# Patient Record
Sex: FEMALE | Race: WHITE | NOT HISPANIC OR LATINO | Employment: OTHER | URBAN - METROPOLITAN AREA
[De-identification: names, ages, dates, MRNs, and addresses within clinical notes are randomized per-mention and may not be internally consistent; named-entity substitution may affect disease eponyms.]

---

## 2017-02-14 ENCOUNTER — ALLSCRIPTS OFFICE VISIT (OUTPATIENT)
Dept: OTHER | Facility: OTHER | Age: 72
End: 2017-02-14

## 2017-02-28 ENCOUNTER — GENERIC CONVERSION - ENCOUNTER (OUTPATIENT)
Dept: OTHER | Facility: OTHER | Age: 72
End: 2017-02-28

## 2017-03-02 ENCOUNTER — TRANSCRIBE ORDERS (OUTPATIENT)
Dept: ADMINISTRATIVE | Facility: HOSPITAL | Age: 72
End: 2017-03-02

## 2017-03-02 DIAGNOSIS — G20 PARKINSON'S DISEASE (HCC): Primary | ICD-10-CM

## 2017-03-06 ENCOUNTER — ALLSCRIPTS OFFICE VISIT (OUTPATIENT)
Dept: RADIOLOGY | Facility: CLINIC | Age: 72
End: 2017-03-06
Payer: COMMERCIAL

## 2017-03-13 ENCOUNTER — HOSPITAL ENCOUNTER (OUTPATIENT)
Dept: MRI IMAGING | Facility: HOSPITAL | Age: 72
Discharge: HOME | End: 2017-03-13
Payer: COMMERCIAL

## 2017-03-13 DIAGNOSIS — G20 PARKINSON'S DISEASE (HCC): ICD-10-CM

## 2017-03-13 PROCEDURE — 70553 MRI BRAIN STEM W/O & W/DYE: CPT

## 2017-03-13 PROCEDURE — A9585 GADOBUTROL INJECTION: HCPCS | Performed by: RADIOLOGY

## 2017-03-13 RX ADMIN — GADOBUTROL 6 ML: 604.72 INJECTION INTRAVENOUS at 14:02

## 2017-03-20 ENCOUNTER — ALLSCRIPTS OFFICE VISIT (OUTPATIENT)
Dept: OTHER | Facility: OTHER | Age: 72
End: 2017-03-20

## 2017-03-22 ENCOUNTER — GENERIC CONVERSION - ENCOUNTER (OUTPATIENT)
Dept: OTHER | Facility: OTHER | Age: 72
End: 2017-03-22

## 2017-03-28 ENCOUNTER — ALLSCRIPTS OFFICE VISIT (OUTPATIENT)
Dept: OTHER | Facility: OTHER | Age: 72
End: 2017-03-28

## 2017-04-10 ENCOUNTER — GENERIC CONVERSION - ENCOUNTER (OUTPATIENT)
Dept: OTHER | Facility: OTHER | Age: 72
End: 2017-04-10

## 2017-05-02 ENCOUNTER — GENERIC CONVERSION - ENCOUNTER (OUTPATIENT)
Dept: OTHER | Facility: OTHER | Age: 72
End: 2017-05-02

## 2017-09-13 ENCOUNTER — GENERIC CONVERSION - ENCOUNTER (OUTPATIENT)
Dept: OTHER | Facility: OTHER | Age: 72
End: 2017-09-13

## 2017-10-31 ENCOUNTER — ALLSCRIPTS OFFICE VISIT (OUTPATIENT)
Dept: OTHER | Facility: OTHER | Age: 72
End: 2017-10-31

## 2017-10-31 DIAGNOSIS — I70.0 ATHEROSCLEROSIS OF AORTA (HCC): ICD-10-CM

## 2017-10-31 DIAGNOSIS — G25.0 ESSENTIAL TREMOR: ICD-10-CM

## 2017-10-31 DIAGNOSIS — K59.09 OTHER CONSTIPATION: ICD-10-CM

## 2017-10-31 DIAGNOSIS — I10 ESSENTIAL (PRIMARY) HYPERTENSION: ICD-10-CM

## 2017-10-31 DIAGNOSIS — D53.9 NUTRITIONAL ANEMIA: ICD-10-CM

## 2017-10-31 DIAGNOSIS — E78.5 HYPERLIPIDEMIA: ICD-10-CM

## 2017-10-31 DIAGNOSIS — H40.9 GLAUCOMA: ICD-10-CM

## 2017-10-31 DIAGNOSIS — Z00.00 ENCOUNTER FOR GENERAL ADULT MEDICAL EXAMINATION WITHOUT ABNORMAL FINDINGS: ICD-10-CM

## 2017-10-31 DIAGNOSIS — F41.9 ANXIETY DISORDER: ICD-10-CM

## 2017-11-06 ENCOUNTER — APPOINTMENT (OUTPATIENT)
Dept: LAB | Facility: CLINIC | Age: 72
End: 2017-11-06
Payer: COMMERCIAL

## 2017-11-06 DIAGNOSIS — F41.9 ANXIETY DISORDER: ICD-10-CM

## 2017-11-06 DIAGNOSIS — D53.9 NUTRITIONAL ANEMIA: ICD-10-CM

## 2017-11-06 DIAGNOSIS — I10 ESSENTIAL (PRIMARY) HYPERTENSION: ICD-10-CM

## 2017-11-06 DIAGNOSIS — K59.09 OTHER CONSTIPATION: ICD-10-CM

## 2017-11-06 DIAGNOSIS — E78.5 HYPERLIPIDEMIA: ICD-10-CM

## 2017-11-06 DIAGNOSIS — G25.0 ESSENTIAL TREMOR: ICD-10-CM

## 2017-11-06 DIAGNOSIS — H40.9 GLAUCOMA: ICD-10-CM

## 2017-11-06 DIAGNOSIS — Z00.00 ENCOUNTER FOR GENERAL ADULT MEDICAL EXAMINATION WITHOUT ABNORMAL FINDINGS: ICD-10-CM

## 2017-11-06 DIAGNOSIS — I70.0 ATHEROSCLEROSIS OF AORTA (HCC): ICD-10-CM

## 2017-11-06 LAB
ALBUMIN SERPL BCP-MCNC: 3.6 G/DL (ref 3.5–5)
ALP SERPL-CCNC: 111 U/L (ref 46–116)
ALT SERPL W P-5'-P-CCNC: 29 U/L (ref 12–78)
ANION GAP SERPL CALCULATED.3IONS-SCNC: 4 MMOL/L (ref 4–13)
AST SERPL W P-5'-P-CCNC: 23 U/L (ref 5–45)
BACTERIA UR QL AUTO: ABNORMAL /HPF
BASOPHILS # BLD AUTO: 0.04 THOUSANDS/ΜL (ref 0–0.1)
BASOPHILS NFR BLD AUTO: 1 % (ref 0–1)
BILIRUB SERPL-MCNC: 0.61 MG/DL (ref 0.2–1)
BILIRUB UR QL STRIP: NEGATIVE
BUN SERPL-MCNC: 11 MG/DL (ref 5–25)
CALCIUM SERPL-MCNC: 8.8 MG/DL (ref 8.3–10.1)
CHLORIDE SERPL-SCNC: 102 MMOL/L (ref 100–108)
CHOLEST SERPL-MCNC: 210 MG/DL (ref 50–200)
CLARITY UR: ABNORMAL
CO2 SERPL-SCNC: 31 MMOL/L (ref 21–32)
COLOR UR: YELLOW
CREAT SERPL-MCNC: 0.79 MG/DL (ref 0.6–1.3)
EOSINOPHIL # BLD AUTO: 0.16 THOUSAND/ΜL (ref 0–0.61)
EOSINOPHIL NFR BLD AUTO: 2 % (ref 0–6)
ERYTHROCYTE [DISTWIDTH] IN BLOOD BY AUTOMATED COUNT: 16.3 % (ref 11.6–15.1)
EST. AVERAGE GLUCOSE BLD GHB EST-MCNC: 128 MG/DL
FOLATE SERPL-MCNC: 9.6 NG/ML (ref 3.1–17.5)
GFR SERPL CREATININE-BSD FRML MDRD: 74 ML/MIN/1.73SQ M
GLUCOSE P FAST SERPL-MCNC: 95 MG/DL (ref 65–99)
GLUCOSE UR STRIP-MCNC: NEGATIVE MG/DL
HBA1C MFR BLD: 6.1 % (ref 4.2–6.3)
HCT VFR BLD AUTO: 35.7 % (ref 34.8–46.1)
HDLC SERPL-MCNC: 48 MG/DL (ref 40–60)
HGB BLD-MCNC: 11 G/DL (ref 11.5–15.4)
HGB UR QL STRIP.AUTO: NEGATIVE
HYALINE CASTS #/AREA URNS LPF: ABNORMAL /LPF
IRON SATN MFR SERPL: 26 %
IRON SERPL-MCNC: 103 UG/DL (ref 50–170)
KETONES UR STRIP-MCNC: NEGATIVE MG/DL
LDLC SERPL CALC-MCNC: 123 MG/DL (ref 0–100)
LEUKOCYTE ESTERASE UR QL STRIP: ABNORMAL
LYMPHOCYTES # BLD AUTO: 2.06 THOUSANDS/ΜL (ref 0.6–4.47)
LYMPHOCYTES NFR BLD AUTO: 28 % (ref 14–44)
MCH RBC QN AUTO: 20.3 PG (ref 26.8–34.3)
MCHC RBC AUTO-ENTMCNC: 30.8 G/DL (ref 31.4–37.4)
MCV RBC AUTO: 66 FL (ref 82–98)
MONOCYTES # BLD AUTO: 0.53 THOUSAND/ΜL (ref 0.17–1.22)
MONOCYTES NFR BLD AUTO: 7 % (ref 4–12)
NEUTROPHILS # BLD AUTO: 4.56 THOUSANDS/ΜL (ref 1.85–7.62)
NEUTS SEG NFR BLD AUTO: 62 % (ref 43–75)
NITRITE UR QL STRIP: NEGATIVE
NON-SQ EPI CELLS URNS QL MICRO: ABNORMAL /HPF
NRBC BLD AUTO-RTO: 0 /100 WBCS
PH UR STRIP.AUTO: 7.5 [PH] (ref 4.5–8)
PLATELET # BLD AUTO: 290 THOUSANDS/UL (ref 149–390)
PMV BLD AUTO: 9.9 FL (ref 8.9–12.7)
POTASSIUM SERPL-SCNC: 4 MMOL/L (ref 3.5–5.3)
PROT SERPL-MCNC: 7.2 G/DL (ref 6.4–8.2)
PROT UR STRIP-MCNC: NEGATIVE MG/DL
RBC # BLD AUTO: 5.42 MILLION/UL (ref 3.81–5.12)
RBC #/AREA URNS AUTO: ABNORMAL /HPF
SODIUM SERPL-SCNC: 137 MMOL/L (ref 136–145)
SP GR UR STRIP.AUTO: 1.01 (ref 1–1.03)
TIBC SERPL-MCNC: 391 UG/DL (ref 250–450)
TRIGL SERPL-MCNC: 194 MG/DL
TSH SERPL DL<=0.05 MIU/L-ACNC: 0.96 UIU/ML (ref 0.36–3.74)
UROBILINOGEN UR QL STRIP.AUTO: 0.2 E.U./DL
VIT B12 SERPL-MCNC: 282 PG/ML (ref 100–900)
WBC # BLD AUTO: 7.4 THOUSAND/UL (ref 4.31–10.16)
WBC #/AREA URNS AUTO: ABNORMAL /HPF

## 2017-11-06 PROCEDURE — 85025 COMPLETE CBC W/AUTO DIFF WBC: CPT

## 2017-11-06 PROCEDURE — 83036 HEMOGLOBIN GLYCOSYLATED A1C: CPT

## 2017-11-06 PROCEDURE — 80053 COMPREHEN METABOLIC PANEL: CPT

## 2017-11-06 PROCEDURE — 81001 URINALYSIS AUTO W/SCOPE: CPT

## 2017-11-06 PROCEDURE — 83540 ASSAY OF IRON: CPT

## 2017-11-06 PROCEDURE — 36415 COLL VENOUS BLD VENIPUNCTURE: CPT

## 2017-11-06 PROCEDURE — 84443 ASSAY THYROID STIM HORMONE: CPT

## 2017-11-06 PROCEDURE — 80061 LIPID PANEL: CPT

## 2017-11-06 PROCEDURE — 83550 IRON BINDING TEST: CPT

## 2017-11-06 PROCEDURE — 82607 VITAMIN B-12: CPT

## 2017-11-06 PROCEDURE — 82746 ASSAY OF FOLIC ACID SERUM: CPT

## 2017-11-07 ENCOUNTER — GENERIC CONVERSION - ENCOUNTER (OUTPATIENT)
Dept: OTHER | Facility: OTHER | Age: 72
End: 2017-11-07

## 2017-12-07 ENCOUNTER — OFFICE VISIT (OUTPATIENT)
Dept: URGENT CARE | Facility: CLINIC | Age: 72
End: 2017-12-07
Payer: COMMERCIAL

## 2017-12-07 DIAGNOSIS — N39.0 URINARY TRACT INFECTION: ICD-10-CM

## 2017-12-07 LAB
BILIRUB UR QL STRIP: NEGATIVE
CLARITY UR: NORMAL
COLOR UR: YELLOW
GLUCOSE (HISTORICAL): NEGATIVE
HGB UR QL STRIP.AUTO: NORMAL
KETONES UR STRIP-MCNC: NEGATIVE MG/DL
LEUKOCYTE ESTERASE UR QL STRIP: NORMAL
NITRITE UR QL STRIP: NEGATIVE
PH UR STRIP.AUTO: 8 [PH]
SP GR UR STRIP.AUTO: 1
UROBILINOGEN UR QL STRIP.AUTO: 0.2

## 2017-12-07 PROCEDURE — 81002 URINALYSIS NONAUTO W/O SCOPE: CPT

## 2017-12-07 PROCEDURE — 99283 EMERGENCY DEPT VISIT LOW MDM: CPT

## 2017-12-07 PROCEDURE — G0382 LEV 3 HOSP TYPE B ED VISIT: HCPCS

## 2017-12-08 ENCOUNTER — APPOINTMENT (OUTPATIENT)
Dept: LAB | Facility: HOSPITAL | Age: 72
End: 2017-12-08
Attending: EMERGENCY MEDICINE
Payer: COMMERCIAL

## 2017-12-08 DIAGNOSIS — N39.0 URINARY TRACT INFECTION: ICD-10-CM

## 2017-12-08 PROCEDURE — 87077 CULTURE AEROBIC IDENTIFY: CPT

## 2017-12-08 PROCEDURE — 87186 SC STD MICRODIL/AGAR DIL: CPT

## 2017-12-08 PROCEDURE — 87086 URINE CULTURE/COLONY COUNT: CPT

## 2017-12-08 NOTE — PROGRESS NOTES
Assessment    1  Acute urinary tract infection (599 0) (N39 0)    Plan  Acute urinary tract infection    · Phenazopyridine HCl - 200 MG Oral Tablet; TAKE 1 TABLET 3 TIMES DAILY AFTERMEALS AS NEEDED   · Sulfamethoxazole-Trimethoprim 800-160 MG Oral Tablet; Take one tab twice dailyfor 7 days    Discussion/Summary  Discussion Summary:   Rest at home  Extra liquids to drink  If he developed back pain fever vomiting or any other worsening return immediately as we discussed  Medication Side Effects Reviewed: Possible side effects of new medications were reviewed with the patient/guardian today  Understands and agrees with treatment plan: The treatment plan was reviewed with the patient/guardian  The patient/guardian understands and agrees with the treatment plan      Chief Complaint    1  Urinary Frequency  Chief Complaint Free Text Note Form: Urinary frequency X 7 days  Started with burning and itching today  History of Present Illness  HPI: This patient has had urinary frequency and urgency for 1 week  She began to have burning dysuria and itching today  No vaginal discharge  No new back pain  No fever or chills  No nausea vomiting  is alert oriented pleasant and in no distress  She does not appear toxic  Flexion CVAs are benign and nontender to palpation or percussion  Abdomen is soft benign and nontender with no guarding or peritoneal signs  Good skin color and turgor  Vital signs stable  Hospital Based Practices Required Assessment:  Pain Assessment  the patient states they have pain  (on a scale of 0 to 10, the patient rates the pain at 8 )  Abuse And Domestic Violence Screen   Yes, the patient is safe at home  Depression And Suicide Screen  No, the patient has not had thoughts of hurting themself  No, the patient has not felt depressed in the past 7 days  Prefered Language is  [place]  Primary Language is  English  Active Problems  1  Abdominal aortic atherosclerosis (440 0) (I70 0)   2  Ambulatory dysfunction (719 7) (R26 2)   3  Anemia due to poor nutrition (281 9) (D53 9)   4  Bicipital tendinitis of left shoulder (726 12) (M75 22)   5  Cataract, left (366 9) (H26 9)   6  Chronic anxiety (300 00) (F41 9)   7  Chronic constipation (564 00) (K59 09)   8  Essential tremor (333 1) (G25 0)   9  Gastroesophageal reflux disease (530 81) (K21 9)   10  Glaucoma (365 9) (H40 9)   11  Hypertension (401 9) (I10)   12  Iron deficiency anemia (280 9) (D50 9)   13  Lumbago (724 2) (M54 5)   14  Lumbosacral spinal stenosis (724 02) (M48 07)   15  Osteopenia (733 90) (M85 80)   16  Spinal stenosis of lumbar region (724 02) (M48 061)    Past Medical History    1  History of Acute frontal sinusitis, recurrence not specified (461 1) (J01 10)   2  History of Foot pain, left (729 5) (M79 672)   3  History of Intervertebral disc disorder with radiculopathy of lumbosacral region (724 4) (M51 17)   4  History of Lumbar disc disease (722 93) (M51 9)   5  History of Sciatica (724 3) (M54 30)   6  History of Traumatic ecchymosis of right forearm, initial encounter (923 10) (S50 11XA)  Active Problems And Past Medical History Reviewed: The active problems and past medical history were reviewed and updated today  Family History  Mother    1  Family history of Diabetes   2  Family history of Heart disease   3  Family history of HTN (hypertension)   4  Family history of Macular degeneration  Father    5  Family history of CHD (coronary heart disease)  Brother    10  Family history of Macular degeneration  Family History    7  Family history of Back disorder  Family History Reviewed: The family history was reviewed and updated today  Social History   · Currently working   · Former smoker (T82 64) (D53 465)   · Full-time employment   · Inadequate exercise (V69 0) (Z72 3)   · Lives with    ·    · Social alcohol use (Z78 9)   · Denied: History of Tobacco use  Social History Reviewed:  The social history was reviewed and updated today  Surgical History    1  History of  Section   2  History of Cholecystectomy Laparoscopic  Surgical History Reviewed: The surgical history was reviewed and updated today  Current Meds   1  Adult Low Dose Aspirin 81 MG TABS; TAKE 1 TABLET DAILY; Therapy: (Recorded:2017) to Recorded   2  ALPRAZolam 0 25 MG Oral Tablet; one tab every 6-8 hours prn; Therapy: 46ZED9807 to (Complete:2018)  Requested for: 2017; Last Rx:39Nfw0102 Ordered   3  Ibuprofen 200 MG Oral Tablet Recorded   4  Metoprolol Tartrate 50 MG Oral Tablet; take one tablet by mouth twice daily; Therapy: 18NHZ1741 to (Evaluate:2017)  Requested for: 2017; Last Rx:2017 Ordered   5  Omeprazole 20 MG Oral Capsule Delayed Release; TAKE 1 CAPSULE Daily Recorded   6  Pravastatin Sodium 10 MG Oral Tablet; Take 1 tablet by mouth at bedtime; Therapy: (Recorded:2017) to  Requested for: 85KCQ8233 Recorded   7  Rasagiline Mesylate 0 5 MG Oral Tablet; TAKE 1 TABLET DAILY; Therapy: (Recorded:2017) to Recorded  Medication List Reviewed: The medication list was reviewed and updated today  Allergies    1  No Known Drug Allergies    Vitals  Signs   Recorded: 15YVK0501 04:50PM   Temperature: 98 5 F  Heart Rate: 76  Respiration: 16  Systolic: 655  Diastolic: 89  Height: 5 ft   Weight: 148 lb   BMI Calculated: 28 9  BSA Calculated: 1 64  O2 Saturation: 99  Pain Scale: 8    Results/Data  Diagnostic Studies Reviewed: I personally reviewed the films/images/results in the office today  My interpretation follows  Diagnostic Review Urine dips positive for leukocytes  Culture sent        Signatures   Electronically signed by : JIM Dotson ; Dec  7 2017  5:11PM EST                       (Author)

## 2017-12-10 LAB — BACTERIA UR CULT: ABNORMAL

## 2018-01-10 NOTE — MISCELLANEOUS
Message  Return to work or school:   Trevor Chan is under my professional care  She was seen in my office on 10/31/17   She is able to return to work on  11/1/17      Due to patient's medical symptoms/condition, we recommend that patient sit during work, and to avoid any prolonged standing as to not aggravate her symptoms/condition  Devin Soulier P A-C        Signatures   Electronically signed by : Devin Soulier, [facility]; Oct 31 2017 11:37AM EST                       (Author)    Electronically signed by : Devin Soulier, [facility]; Oct 31 2017 11:44AM EST                       (Author)

## 2018-01-12 NOTE — RESULT NOTES
Message   Recorded as Task   Date: 09/16/2016 10:25 AM, Created By: Viji Sandhu   Task Name: Follow Up   Assigned To: Ling Read   Regarding Patient: Chanel Nurse, Status: Active   Comment:    Sussy Corley - 16 Sep 2016 10:25 AM     TASK CREATED  Caller: Self; General Medical Question; [phone] (Home); [phone] (Mobile Phone)  s/w pt, states pain in her b/l low back and buttock has returned  Saw pcp yesterday - referred her back to [address]  Per DG's ov note of 9/6/2016 - if symptoms would return or worsen, poss repeat injection  Advised pt, will d/w Dr Chan Roque and cb  Pt verbalized understanding and denied blood thinning medication  Elliot Mercer - 16 Sep 2016 10:36 AM     TASK REPLIED TO: Previously Assigned To [facility] [place] [facility]              ok to schedule   Sussy Corley - 16 Sep 2016 12:55 PM     TASK REASSIGNED: Previously Assigned To [facility] [place] [facility]   Ling Read - 16 Sep 2016 3:44 PM     TASK REASSIGNED: Previously Assigned To [facility] sched,Team  what procedure to be scheduled? Elliot Mercer - 17 Sep 2016 8:54 AM     TASK REPLIED TO: Previously Assigned To Elliot Mercer Phyllis - 19 Sep 2016 1:45 PM     TASK REASSIGNED: Previously Assigned To [facility] sched,Team   Ling Read - 19 Sep 2016 1:48 PM     TASK EDITED  called pt to schedule procedure and pt states she is doing better if needed she will call back     Elliot Mercer - 19 Sep 2016 3:44 PM     TASK REPLIED TO: Previously Assigned To Elliot Mercer

## 2018-01-12 NOTE — RESULT NOTES
Message   Recorded as Task   Date: 10/07/2016 11:27 AM, Created By: Derick Greenwood   Task Name: Miscellaneous   Assigned To: Ling Read   Regarding Patient: Kaila Oseguera, Status: Active   CommentEfraim Halt - 07 Oct 2016 11:27 AM     TASK CREATED  Pt has had 2 inj for LESI in July and Aug of this year  Her pain has returned  Can she be sched for a 3rd inj or does she need to be seen in the office first?   [facility] - 07 Oct 2016 7:46 PM     TASK REASSIGNED: Previously Assigned To Elliot Mercer  please review where pain is and any changes   Sussy Corley - 10 Oct 2016 11:14 AM     TASK EDITED  S/w pt, states her pain is in the bottom of her spine and the top of her legs b/l  Per pt, at this time, no improvement in her pain w/ procedures  Advised pt, will d/w Dr Oscar Carrillo and cb  Pt verbalized understanding and appreciation  Elliot Mercer - 10 Oct 2016 11:47 AM     TASK REASSIGNED: Previously Assigned To Elliot Mercer      ok to reschedule then 2 week f/u with dg after procedure   Ling Read - 11 Oct 2016 11:34 AM     TASK EDITED   procedure scheduled for 10/17/2016 pt aware if becomes ill/antbx to call to r/s, need for , npo 1 hr prior, wear comfortable pants, pt verbally understands

## 2018-01-13 VITALS
WEIGHT: 146.38 LBS | BODY MASS INDEX: 29.51 KG/M2 | HEART RATE: 64 BPM | SYSTOLIC BLOOD PRESSURE: 130 MMHG | HEIGHT: 59 IN | DIASTOLIC BLOOD PRESSURE: 74 MMHG

## 2018-01-13 VITALS
DIASTOLIC BLOOD PRESSURE: 84 MMHG | BODY MASS INDEX: 29.84 KG/M2 | HEART RATE: 60 BPM | WEIGHT: 148 LBS | SYSTOLIC BLOOD PRESSURE: 152 MMHG | HEIGHT: 59 IN

## 2018-01-13 VITALS
DIASTOLIC BLOOD PRESSURE: 80 MMHG | TEMPERATURE: 100.4 F | SYSTOLIC BLOOD PRESSURE: 140 MMHG | HEIGHT: 59 IN | HEART RATE: 60 BPM | WEIGHT: 144 LBS | BODY MASS INDEX: 29.03 KG/M2

## 2018-01-14 VITALS
HEART RATE: 66 BPM | BODY MASS INDEX: 29.23 KG/M2 | WEIGHT: 145 LBS | DIASTOLIC BLOOD PRESSURE: 70 MMHG | SYSTOLIC BLOOD PRESSURE: 140 MMHG | HEIGHT: 59 IN

## 2018-01-15 NOTE — MISCELLANEOUS
Message   Recorded as Task   Date: 09/13/2017 10:31 AM, Created By: Jann Conte   Task Name: Med Renewal Request   Assigned To: Elizabeth Hopkins   Regarding Patient: Lulu Piper, Status: Active   CommentRangel Walter - 13 Sep 2017 10:31 AM     TASK CREATED  Caller: Self; [phone] (Home); [phone] x,,,,, (Work)  [facility] patient - last OV 03/28/17  -- Refill Alprazolam 0 25mg to [facility] -- call [phone] if ? Justine Quiroz - 13 Sep 2017 1:04 PM     TASK REASSIGNED: Previously Assigned To Justine Quiroz  Please call this in  I have entered it  Elizabeth Hopkins - 13 Sep 2017 1:20 PM     TASK EDITED  Dalton Ledbetter advised  PLM        Active Problems    1  Abdominal aortic atherosclerosis (440 0) (I70 0)   2  Acute laryngotracheitis (464 20) (J04 2)   3  Ambulatory dysfunction (719 7) (R26 2)   4  Bicipital tendinitis of left shoulder (726 12) (M75 22)   5  Calcaneal spur (726 73) (M77 30)   6  Cataract, left (366 9) (H26 9)   7  Chronic anxiety (300 00) (F41 9)   8  Chronic constipation (564 00) (K59 09)   9  Essential tremor (333 1) (G25 0)   10  Gastroesophageal reflux disease (530 81) (K21 9)   11  Glaucoma (365 9) (H40 9)   12  Hyperlipidemia (272 4) (E78 5)   13  Hypertension (401 9) (I10)   14  Iron deficiency anemia (280 9) (D50 9)   15  Lumbago (724 2) (M54 5)   16  Lumbosacral spinal stenosis (724 02) (M48 07)   17  Neck sprain, initial encounter (847 0) (S13 9XXA)   18  Osteopenia (733 90) (M85 80)   19  Spinal stenosis of lumbar region (724 02) (M48 06)    Current Meds   1  Adult Low Dose Aspirin 81 MG TABS; TAKE 1 TABLET DAILY; Therapy: (Recorded:20Mar2017) to Recorded   2  Ibuprofen 200 MG Oral Tablet Recorded   3  Metoprolol Tartrate 50 MG Oral Tablet; take one tablet by mouth twice daily; Therapy: 01CRR0442 to (Evaluate:34Ffk2042)  Requested for: 21Bkx1980; Last   Rx:70Lby4191 Ordered   4  Omeprazole 20 MG Oral Capsule Delayed Release; TAKE 1 CAPSULE Daily Recorded   5  Pravastatin Sodium 40 MG Oral Tablet; TAKE 1 TABLET AT BEDTIME  Requested for:   64Ani0992; Last Rx:37Bgn9376 Ordered   6  Rasagiline Mesylate 0 5 MG Oral Tablet; TAKE 1 TABLET DAILY; Therapy: (Recorded:20Mar2017) to Recorded    Allergies    1   No Known Drug Allergies    Plan  Chronic anxiety    · ALPRAZolam 0 25 MG Oral Tablet; one tab every 6-8 hours prn    Signatures   Electronically signed by : Naga Partida, ; Sep 13 2017  1:20PM EST                       (Author)

## 2018-01-18 NOTE — MISCELLANEOUS
Message   Recorded as Task   Date: 04/10/2017 07:15 AM, Created By: Eugene Pereira   Task Name: Miscellaneous   Assigned To: Eugene Pereira   Regarding Patient: Barry Fernandez, Status: Active   CommentTyrell Oddi - 10 Apr 2017 7:15 AM     TASK CREATED  PT LM/W SERVICE TO CX APPT FOR TODAY  SHE IS [place] OF TOWN   SHE W/C/B TO R/S   Mark Umana - 10 Apr 2017 7:32 AM     TASK REPLIED TO: Previously Assigned To Mark Umana  Provider aware  Thank you  Active Problems    1  Abdominal aortic atherosclerosis (440 0) (I70 0)   2  Acute laryngotracheitis (464 20) (J04 2)   3  Ambulatory dysfunction (719 7) (R26 2)   4  Bicipital tendinitis of left shoulder (726 12) (M75 22)   5  Calcaneal spur (726 73) (M77 30)   6  Cataract, left (366 9) (H26 9)   7  Chronic anxiety (300 00) (F41 9)   8  Chronic constipation (564 00) (K59 09)   9  Essential tremor (333 1) (G25 0)   10  Gastroesophageal reflux disease (530 81) (K21 9)   11  Glaucoma (365 9) (H40 9)   12  Hyperlipidemia (272 4) (E78 5)   13  Hypertension (401 9) (I10)   14  Iron deficiency anemia (280 9) (D50 9)   15  Lumbago (724 2) (M54 5)   16  Lumbosacral spinal stenosis (724 02) (M48 07)   17  Neck sprain, initial encounter (847 0) (S13 9XXA)   18  Osteopenia (733 90) (M85 80)   19  Spinal stenosis of lumbar region (724 02) (M48 06)    Current Meds   1  Adult Low Dose Aspirin 81 MG TABS; TAKE 1 TABLET DAILY; Therapy: (Recorded:20Mar2017) to Recorded   2  ALPRAZolam 0 25 MG Oral Tablet; one tab every 6-8 hours prn; Therapy: 41IZT1045 to (Complete:28Apr2017)  Requested for: 30Sot5434; Last   Rx:35Tkj8976 Ordered   3  Ibuprofen 200 MG Oral Tablet Recorded   4  Metoprolol Tartrate 50 MG Oral Tablet; take one tablet by mouth twice daily; Therapy: 14OXH4358 to (Evaluate:12Oct2016)  Requested for: 53Mzj4636; Last   Rx:04Stk6102 Ordered   5  Omeprazole 20 MG Oral Capsule Delayed Release; TAKE 1 CAPSULE Daily Recorded   6   Pravastatin Sodium 40 MG Oral Tablet; TAKE 1 TABLET AT BEDTIME  Requested for:   65Niq3111; Last Rx:67Atv6634 Ordered   7  Rasagiline Mesylate 0 5 MG Oral Tablet; TAKE 1 TABLET DAILY; Therapy: (Recorded:20Mar2017) to Recorded    Allergies    1  No Known Drug Allergies    Signatures   Electronically signed by :  Arti Dowell, ; Apr 10 2017  8:33AM EST                       (Author)

## 2018-01-23 VITALS
OXYGEN SATURATION: 99 % | SYSTOLIC BLOOD PRESSURE: 154 MMHG | BODY MASS INDEX: 29.06 KG/M2 | HEART RATE: 76 BPM | WEIGHT: 148 LBS | HEIGHT: 60 IN | RESPIRATION RATE: 16 BRPM | TEMPERATURE: 98.5 F | DIASTOLIC BLOOD PRESSURE: 89 MMHG

## 2018-04-03 ENCOUNTER — TELEPHONE (OUTPATIENT)
Dept: FAMILY MEDICINE CLINIC | Facility: CLINIC | Age: 73
End: 2018-04-03

## 2018-04-03 DIAGNOSIS — G25.0 ESSENTIAL TREMOR: Primary | ICD-10-CM

## 2018-04-11 ENCOUNTER — OFFICE VISIT (OUTPATIENT)
Dept: FAMILY MEDICINE CLINIC | Facility: CLINIC | Age: 73
End: 2018-04-11
Payer: MEDICARE

## 2018-04-11 VITALS
SYSTOLIC BLOOD PRESSURE: 150 MMHG | WEIGHT: 149 LBS | HEART RATE: 86 BPM | BODY MASS INDEX: 29.25 KG/M2 | HEIGHT: 60 IN | DIASTOLIC BLOOD PRESSURE: 88 MMHG | TEMPERATURE: 98.2 F | RESPIRATION RATE: 12 BRPM

## 2018-04-11 DIAGNOSIS — M54.5 LOW BACK PAIN, UNSPECIFIED BACK PAIN LATERALITY, UNSPECIFIED CHRONICITY, WITH SCIATICA PRESENCE UNSPECIFIED: Primary | ICD-10-CM

## 2018-04-11 PROBLEM — D53.9 ANEMIA DUE TO POOR NUTRITION: Status: ACTIVE | Noted: 2017-10-31

## 2018-04-11 LAB
SL AMB  POCT GLUCOSE, UA: NORMAL
SL AMB LEUKOCYTE ESTERASE,UA: NORMAL
SL AMB POCT BILIRUBIN,UA: NORMAL
SL AMB POCT BLOOD,UA: NORMAL
SL AMB POCT CLARITY,UA: CLEAR
SL AMB POCT COLOR,UA: YELLOW
SL AMB POCT KETONES,UA: NORMAL
SL AMB POCT NITRITE,UA: NORMAL
SL AMB POCT PH,UA: 6
SL AMB POCT SPECIFIC GRAVITY,UA: 1.01
SL AMB POCT URINE PROTEIN: NORMAL
SL AMB POCT UROBILINOGEN: NORMAL

## 2018-04-11 PROCEDURE — 99213 OFFICE O/P EST LOW 20 MIN: CPT | Performed by: FAMILY MEDICINE

## 2018-04-11 PROCEDURE — 81003 URINALYSIS AUTO W/O SCOPE: CPT | Performed by: FAMILY MEDICINE

## 2018-04-11 RX ORDER — OMEPRAZOLE 20 MG/1
1 CAPSULE, DELAYED RELEASE ORAL DAILY
COMMUNITY
End: 2019-01-14 | Stop reason: ALTCHOICE

## 2018-04-11 RX ORDER — LATANOPROST 50 UG/ML
SOLUTION/ DROPS OPHTHALMIC
COMMUNITY
Start: 2018-04-05 | End: 2019-07-03

## 2018-04-11 RX ORDER — IBUPROFEN 200 MG
TABLET ORAL
COMMUNITY
End: 2019-08-29 | Stop reason: ALTCHOICE

## 2018-04-11 RX ORDER — RASAGILINE 0.5 MG/1
1 TABLET ORAL DAILY
COMMUNITY
End: 2018-11-16 | Stop reason: SDUPTHER

## 2018-04-11 RX ORDER — PREDNISONE 10 MG/1
TABLET ORAL
Qty: 21 TABLET | Refills: 0 | Status: SHIPPED | OUTPATIENT
Start: 2018-04-11 | End: 2018-05-23

## 2018-04-11 NOTE — PROGRESS NOTES
Assessment/Plan:     Diagnoses and all orders for this visit:    Low back pain, unspecified back pain laterality, unspecified chronicity, with sciatica presence unspecified  -     POCT urine dip auto non-scope  -     Ambulatory referral to Physical Therapy; Future  -     predniSONE 10 mg tablet; 6 tabs on Day 1,5 tabs on Day 2,4 tabs on Day 3,3 tabs on Day 4,2 tabs on  Day 5 And 1 tab on Day 6      Urine sample was normal and showed no signs of any infection  Upon exam patient has very very tight spasm the lower back muscles on both sides  Will treat with prednisone heating pad and refer to physical therapy  Follow-up as needed          Subjective:     Chief Complaint   Patient presents with    Back Pain     pain in lower back, is worse whe getting out of bed in the morning  Patient ID: Gunnar Mead is a 74 y o  female  Here for back pain x1 month  Patient states worse in the morning when getting up out of bed  Gets a little better as the day goes on  Pain is both sides  No radiation down legs  But causing issue sleeping        The following portions of the patient's history were reviewed and updated as appropriate: allergies, current medications, past family history, past medical history, past social history, past surgical history and problem list     Review of Systems   Constitutional: Negative  HENT: Negative  Eyes: Negative  Respiratory: Negative  Cardiovascular: Negative  Gastrointestinal: Negative  Endocrine: Negative  Genitourinary: Negative  Musculoskeletal: Positive for arthralgias and back pain  Skin: Negative  Allergic/Immunologic: Negative  Neurological: Negative  Hematological: Negative  Psychiatric/Behavioral: Negative  All other systems reviewed and are negative          Objective:    Vitals:    04/11/18 1324   BP: 150/88   BP Location: Right arm   Patient Position: Sitting   Cuff Size: Standard   Pulse: 86   Resp: 12   Temp: 98 2 °F (36 8 °C) TempSrc: Tympanic   Weight: 67 6 kg (149 lb)   Height: 5' (1 524 m)          Physical Exam   Constitutional: She is oriented to person, place, and time  She appears well-developed and well-nourished  HENT:   Head: Normocephalic and atraumatic  Right Ear: External ear normal    Left Ear: External ear normal    Mouth/Throat: Oropharynx is clear and moist    Eyes: Conjunctivae and EOM are normal  Pupils are equal, round, and reactive to light  Neck: Normal range of motion  Cardiovascular: Normal rate, regular rhythm and normal heart sounds  Pulmonary/Chest: Effort normal and breath sounds normal    Abdominal: Soft  Bowel sounds are normal    Musculoskeletal: Normal range of motion  Neurological: She is alert and oriented to person, place, and time  She has normal reflexes  Bradykinesia  Essential tremor   Skin: Skin is warm and dry  Psychiatric: She has a normal mood and affect  Her behavior is normal  Judgment and thought content normal    Nursing note and vitals reviewed

## 2018-04-16 DIAGNOSIS — F41.9 ANXIETY: Primary | ICD-10-CM

## 2018-04-16 RX ORDER — ALPRAZOLAM 0.25 MG/1
0.25 TABLET ORAL 3 TIMES DAILY PRN
Qty: 30 TABLET | Refills: 0 | OUTPATIENT
Start: 2018-04-16 | End: 2018-05-07 | Stop reason: SDUPTHER

## 2018-05-07 DIAGNOSIS — F41.9 ANXIETY: ICD-10-CM

## 2018-05-07 RX ORDER — ALPRAZOLAM 0.25 MG/1
0.25 TABLET ORAL 3 TIMES DAILY PRN
Qty: 30 TABLET | Refills: 0 | OUTPATIENT
Start: 2018-05-07 | End: 2018-05-21 | Stop reason: SDUPTHER

## 2018-05-21 DIAGNOSIS — F41.9 ANXIETY: ICD-10-CM

## 2018-05-21 RX ORDER — ALPRAZOLAM 0.25 MG/1
0.25 TABLET ORAL 3 TIMES DAILY PRN
Qty: 50 TABLET | Refills: 1 | Status: SHIPPED | OUTPATIENT
Start: 2018-05-21 | End: 2018-07-23 | Stop reason: SDUPTHER

## 2018-05-23 ENCOUNTER — OFFICE VISIT (OUTPATIENT)
Dept: FAMILY MEDICINE CLINIC | Facility: CLINIC | Age: 73
End: 2018-05-23
Payer: MEDICARE

## 2018-05-23 VITALS
HEART RATE: 84 BPM | RESPIRATION RATE: 12 BRPM | WEIGHT: 148 LBS | SYSTOLIC BLOOD PRESSURE: 150 MMHG | BODY MASS INDEX: 29.06 KG/M2 | HEIGHT: 60 IN | DIASTOLIC BLOOD PRESSURE: 86 MMHG

## 2018-05-23 DIAGNOSIS — M54.5 LOW BACK PAIN, UNSPECIFIED BACK PAIN LATERALITY, UNSPECIFIED CHRONICITY, WITH SCIATICA PRESENCE UNSPECIFIED: Primary | ICD-10-CM

## 2018-05-23 PROCEDURE — 99213 OFFICE O/P EST LOW 20 MIN: CPT | Performed by: FAMILY MEDICINE

## 2018-05-23 RX ORDER — PREDNISONE 10 MG/1
TABLET ORAL
Qty: 21 TABLET | Refills: 0 | Status: SHIPPED | OUTPATIENT
Start: 2018-05-23 | End: 2018-12-10 | Stop reason: ALTCHOICE

## 2018-05-23 NOTE — PROGRESS NOTES
Assessment/Plan:     Diagnoses and all orders for this visit:    Low back pain, unspecified back pain laterality, unspecified chronicity, with sciatica presence unspecified  -     Ambulatory referral to Physical Therapy; Future  -     predniSONE 10 mg tablet; 6 tabs on Day 1,5 tabs on Day 2,4 tabs on Day 3,3 tabs on Day 4,2 tabs on  Day 5 And 1 tab on Day 6            Subjective:     Chief Complaint   Patient presents with    Follow-up     1 month follow up for low back pain    Back Pain        Patient ID: Romie Bhatia is a 74 y o  female  Here for follow-up of back pain  Patient states got better after last visit  But now has returned back to previous state  Did not go to physical therapy as was directed at last visit        The following portions of the patient's history were reviewed and updated as appropriate: allergies, current medications, past family history, past medical history, past social history, past surgical history and problem list     Review of Systems   Constitutional: Negative  HENT: Negative  Eyes: Negative  Respiratory: Negative  Cardiovascular: Negative  Gastrointestinal: Negative  Endocrine: Negative  Genitourinary: Negative  Musculoskeletal: Negative  Skin: Negative  Allergic/Immunologic: Negative  Neurological: Negative  Hematological: Negative  Psychiatric/Behavioral: Negative  All other systems reviewed and are negative  Objective:    Vitals:    05/23/18 1120   BP: 150/86   BP Location: Left arm   Patient Position: Sitting   Cuff Size: Standard   Pulse: 84   Resp: 12   Weight: 67 1 kg (148 lb)   Height: 5' (1 524 m)          Physical Exam   Constitutional: She is oriented to person, place, and time  She appears well-developed and well-nourished  HENT:   Head: Normocephalic and atraumatic     Right Ear: External ear normal    Left Ear: External ear normal    Mouth/Throat: Oropharynx is clear and moist    Eyes: Conjunctivae and EOM are normal  Pupils are equal, round, and reactive to light  Neck: Normal range of motion  Cardiovascular: Normal rate, regular rhythm and normal heart sounds  Pulmonary/Chest: Effort normal and breath sounds normal    Abdominal: Soft  Bowel sounds are normal    Musculoskeletal: Normal range of motion  Neurological: She is alert and oriented to person, place, and time  She has normal reflexes  Skin: Skin is warm and dry  Psychiatric: She has a normal mood and affect  Her behavior is normal  Judgment and thought content normal    Nursing note and vitals reviewed

## 2018-06-11 ENCOUNTER — EVALUATION (OUTPATIENT)
Dept: PHYSICAL THERAPY | Facility: CLINIC | Age: 73
End: 2018-06-11
Payer: MEDICARE

## 2018-06-11 DIAGNOSIS — M54.5 LOW BACK PAIN, UNSPECIFIED BACK PAIN LATERALITY, UNSPECIFIED CHRONICITY, WITH SCIATICA PRESENCE UNSPECIFIED: Primary | ICD-10-CM

## 2018-06-11 PROCEDURE — 97161 PT EVAL LOW COMPLEX 20 MIN: CPT | Performed by: PHYSICAL THERAPIST

## 2018-06-11 PROCEDURE — 97110 THERAPEUTIC EXERCISES: CPT | Performed by: PHYSICAL THERAPIST

## 2018-06-11 PROCEDURE — G8979 MOBILITY GOAL STATUS: HCPCS | Performed by: PHYSICAL THERAPIST

## 2018-06-11 PROCEDURE — G8978 MOBILITY CURRENT STATUS: HCPCS | Performed by: PHYSICAL THERAPIST

## 2018-06-11 NOTE — PROGRESS NOTES
PT Evaluation     Today's date: 2018  Patient name: Merline Wolf  : 1945  MRN: [id number]  Referring provider: Ray Love DO  Dx:   Encounter Diagnosis     ICD-10-CM    1  Low back pain, unspecified back pain laterality, unspecified chronicity, with sciatica presence unspecified M54 5 Ambulatory referral to Physical Therapy                  Assessment  Impairments: abnormal gait, abnormal muscle firing, abnormal muscle tone, abnormal or restricted ROM, activity intolerance, lacks appropriate home exercise program, pain with function and poor posture     Assessment details: Patient is a 74y o  year old female presenting to physical therapy with low back pain  Patient presents with pain, decreased strength, decreased ROM, and decreased tolerance to activity  Patient would benefit from skilled physical therapy services to address these impairments and to maximize function  Thank you for the referral     Understanding of Dx/Px/POC: excellent  Goals  Impairment Goals:  1 ) Pt will have decreased sx during her normal day by 50% in 3-4 weeks  2 ) Pt will have improve active lumbar flexion range of motion by 10 degrees in 4-6 weeks  3 ) Pt will have improved hip strength throughout by 1/2 MMT in 4-6 weeks  4 ) Pt will have decreased tenderness to palpation to lumbar paraspinal musculature by 50% in 4-6 weeks  Functional Goals:  1 ) Pt will be independent in their home exercise program in 1 week  2 ) Pt will be able to get up out of bed with 50% less pain in 3-4 weeks  3 ) Pt will be able to complete all ADL's without pain in 6-8 weeks      Plan  Patient would benefit from: skilled PT  Planned modality interventions: TENS  Planned therapy interventions: joint mobilization, manual therapy, patient education, postural training, strengthening, stretching, work reintegration, home exercise program, therapeutic exercise, body mechanics training and neuromuscular re-education  Frequency: 1x week  Duration in weeks: 8  Treatment plan discussed with: patient  Plan details: POC ends: 18  1x/week due to work schedule        Subjective Evaluation    History of Present Illness  Mechanism of injury: Pt reports that her back pain started about 4-5 months ago  She noticed her pain/stiffness was getting worse in the morning when she was first getting out of bed  She notices it gets better throughout her day  Pt reports her back pain is local to her low back, midline, and is described as achy and sharp at times  Pt denies bowel or bladder changes, numbness and tingling or unrelenting night pain  Currently she works at [employer] as a [employer], 7 am - 4pm shift 3x/week  Pt denies pain during the night and has no difficulties falling asleep, side sleeper  Resting tremor noted in right UE and LE during examination, she is aware of this and has an appointment scheduled to further look into Parkinson's disease     Aggs: getting up first thing in the morning, getting her pants on/off, going up stairs (she goes one step at a time)  Eases: Heat, aleve, ibuprofen, rest   Recurrent probem    Pain  Current pain ratin  At best pain ratin  At worst pain rating: 10  Quality: sharp and dull ache  Relieving factors: change in position, heat, medications and rest  Aggravating factors: walking and standing    Patient Goals  Patient goals for therapy: decreased pain          Objective     Special Questions  Negative for night pain, disturbed sleep, bladder dysfunction, bowel dysfunction and saddle (S4) numbness    Static Posture     Head  Forward  Shoulders  Rounded  Postural Observations  Seated posture: fair  Standing posture: fair        Palpation   Left   Hypertonic in the erector spinae  Tenderness of the erector spinae  Right   Hypertonic in the erector spinae  Tenderness of the erector spinae  Cervical Spine Comments  Left erector spinae: L1-5  Right erector spinae: L1-5       Tenderness     Lumbar Spine  Tenderness in the spinous process and facet joint  Neurological Testing     Sensation     Lumbar   Left   Intact: light touch    Right   Intact: light touch    Reflexes   Left   Patellar (L4): trace (1+)  Achilles (S1): trace (1+)    Right   Patellar (L4): trace (1+)  Achilles (S1): trace (1+)    Active Range of Motion     Lumbar   Flexion: Active lumbar flexion: Mod    Extension: Active lumbar extension: Mod  with pain  Left lateral flexion: Active left lumbar lateral flexion: Mod    Right lateral flexion: Active right lumbar lateral flexion: Mod    Left rotation: Active left lumbar rotation: Min  Right rotation: Active right lumbar rotation: Min  Passive Range of Motion     Additional Passive Range of Motion Details  Hypomobility noted throughout the lumbar spine (PA/Uni) with TTP during assessment  Strength/Myotome Testing     Left Hip   Planes of Motion   Flexion: 3+  Extension: 3+  Abduction: 3+    Right Hip   Planes of Motion   Flexion: 3+  Extension: 3+  Abduction: 3+    Left Knee   Flexion: 3+  Extension: 4-    Right Knee   Flexion: 3+  Extension: 4-    Left Ankle/Foot   Dorsiflexion: 3+  Plantar flexion: 3+    Right Ankle/Foot   Dorsiflexion: 3+  Plantar flexion: 3+    Tests       Thoracic   Negative slump  Lumbar   Negative slump  Ambulation     Observational Gait   Decreased walking speed and stride length  Functional Assessment     Comments  Pt with intense pain upon lying in supine position, great improvement in sx with supine hook lying position  Resting tremor noted in R UE while seated and in R LE when sitting with legs hanging off edge of bed      General Comments     Lumbar Comments  HS 90/90: lacking 15 degrees knee extension B        Precautions: HTN, osteopenia, anxiety, essential tremor    Daily Treatment Diary       Manuals                                                                 Exercise Diary              LTR 10x10''            SKTC 10x10''            Seated HS stretch 5x20''            Charisse chavez            Recumbent bike nv            STS nv            NBOS EO/EC nv                                                                                                                                                                                                               Modalities

## 2018-06-19 ENCOUNTER — OFFICE VISIT (OUTPATIENT)
Dept: PHYSICAL THERAPY | Facility: CLINIC | Age: 73
End: 2018-06-19
Payer: MEDICARE

## 2018-06-19 DIAGNOSIS — M54.5 LOW BACK PAIN, UNSPECIFIED BACK PAIN LATERALITY, UNSPECIFIED CHRONICITY, WITH SCIATICA PRESENCE UNSPECIFIED: Primary | ICD-10-CM

## 2018-06-19 PROCEDURE — 97110 THERAPEUTIC EXERCISES: CPT | Performed by: PHYSICAL THERAPIST

## 2018-06-19 PROCEDURE — 97140 MANUAL THERAPY 1/> REGIONS: CPT | Performed by: PHYSICAL THERAPIST

## 2018-06-19 PROCEDURE — 97112 NEUROMUSCULAR REEDUCATION: CPT | Performed by: PHYSICAL THERAPIST

## 2018-06-19 NOTE — PROGRESS NOTES
Daily Note     Today's date: 2018  Patient name: Saundra Preston  : 1945  MRN: [id number]  Referring provider: Martina Espana DO  Dx:   Encounter Diagnosis     ICD-10-CM    1  Low back pain, unspecified back pain laterality, unspecified chronicity, with sciatica presence unspecified M54 5                   Subjective: Pt reports some soreness in the low back after the first day of doing the exercises, since that point the soreness has reduced  Objective: See treatment diary below    Precautions: HTN, osteopenia, anxiety, essential tremor     Daily Treatment Diary         Manuals                        STM Lumbar PSM B    8'                                                                                           Exercise Diary                        LTR 10x10'' P! np                   SKTC 10x10'' 10x10''                   Seated HS stretch 5x20'' 5x20''                   Supine hip ABD nv  2x10 RTB                   STS nv  nv                   NBOS EO/EC nv  nv                   Recumbent bike    10' lvl 0                   PPT   10x10''                    SLR w/ PPT   x10 ea                                                                                                                                                                                                                                                                                                                    Modalities                                                                          Assessment: Tolerated treatment well  Patient demonstrated fatigue post treatment  Continued difficulty with transitions from sit to supine with low back pain noted, educated pt to log roll for transitions  Verbal cueing to correct posterior pelvic tilt, able to correct post, concurrent decrease in low back pain with pelvic tilt during SLR  Fatigue at end of session  Plan: Continue per plan of care

## 2018-06-26 ENCOUNTER — APPOINTMENT (OUTPATIENT)
Dept: PHYSICAL THERAPY | Facility: CLINIC | Age: 73
End: 2018-06-26
Payer: MEDICARE

## 2018-07-03 ENCOUNTER — APPOINTMENT (OUTPATIENT)
Dept: PHYSICAL THERAPY | Facility: CLINIC | Age: 73
End: 2018-07-03
Payer: MEDICARE

## 2018-07-10 ENCOUNTER — APPOINTMENT (OUTPATIENT)
Dept: PHYSICAL THERAPY | Facility: CLINIC | Age: 73
End: 2018-07-10
Payer: MEDICARE

## 2018-07-17 ENCOUNTER — OFFICE VISIT (OUTPATIENT)
Dept: PHYSICAL THERAPY | Facility: CLINIC | Age: 73
End: 2018-07-17
Payer: MEDICARE

## 2018-07-17 DIAGNOSIS — M54.5 LOW BACK PAIN, UNSPECIFIED BACK PAIN LATERALITY, UNSPECIFIED CHRONICITY, WITH SCIATICA PRESENCE UNSPECIFIED: Primary | ICD-10-CM

## 2018-07-17 PROCEDURE — 97112 NEUROMUSCULAR REEDUCATION: CPT | Performed by: PHYSICAL THERAPIST

## 2018-07-17 PROCEDURE — 97110 THERAPEUTIC EXERCISES: CPT | Performed by: PHYSICAL THERAPIST

## 2018-07-17 PROCEDURE — 97140 MANUAL THERAPY 1/> REGIONS: CPT | Performed by: PHYSICAL THERAPIST

## 2018-07-17 PROCEDURE — G8978 MOBILITY CURRENT STATUS: HCPCS | Performed by: PHYSICAL THERAPIST

## 2018-07-17 PROCEDURE — G8979 MOBILITY GOAL STATUS: HCPCS | Performed by: PHYSICAL THERAPIST

## 2018-07-17 NOTE — PROGRESS NOTES
Daily Note     Today's date: 2018  Patient name: Katelyn Parham  : 1945  MRN: [id number]  Referring provider: Adriana Abdi DO  Dx:   Encounter Diagnosis     ICD-10-CM    1  Low back pain, unspecified back pain laterality, unspecified chronicity, with sciatica presence unspecified M54 5                   Subjective: Pt reports that she was away to [facility]) and was visiting her bother because of family issues  Pt reports some soreness this afternoon, 3/10 pain upon arrival  Notes that her legs continue to get very tired as she is walking  Objective: See treatment diary below    Precautions: HTN, osteopenia, anxiety, essential tremor     Daily Treatment Diary      Manuals                      STM Lumbar PSM B    8'  10''                                                                                         Exercise Diary                        LTR 10x10'' P! np                   SKTC 10x10'' 10x10''  10x10''                 Seated HS stretch 5x20'' 5x20''                   Supine hip ADD/ABD nv  2x10 RTB  [id number]'', RTB                 STS nv  nv nv                 NBOS EO/EC nv  nv  nv                 Recumbent bike    10' lvl 0  5' lvl 0                 PPT   10x10''   10x10''                 SLR w/ PPT   x10 ea   x10 ea                 Supine HS stretch     10x10''                                                                                                                                                                                                                                                                                         Modalities                                                                          Assessment: Tolerated treatment fair  Patient demonstrated fatigue post treatment  Pt with much fatigue during use of recumbent bike, no increase in pain during TE this session   Appears to have significantly decreased endurance as compared to last session, will continue to progress next session as able  Plan: Continue per plan of care

## 2018-07-23 DIAGNOSIS — F41.9 ANXIETY: ICD-10-CM

## 2018-07-23 RX ORDER — ALPRAZOLAM 0.25 MG/1
0.25 TABLET ORAL 3 TIMES DAILY PRN
Qty: 50 TABLET | Refills: 0 | Status: SHIPPED | OUTPATIENT
Start: 2018-07-23 | End: 2018-08-27 | Stop reason: SDUPTHER

## 2018-07-24 ENCOUNTER — OFFICE VISIT (OUTPATIENT)
Dept: PHYSICAL THERAPY | Facility: CLINIC | Age: 73
End: 2018-07-24
Payer: MEDICARE

## 2018-07-24 DIAGNOSIS — M54.5 LOW BACK PAIN, UNSPECIFIED BACK PAIN LATERALITY, UNSPECIFIED CHRONICITY, WITH SCIATICA PRESENCE UNSPECIFIED: Primary | ICD-10-CM

## 2018-07-24 PROCEDURE — 97140 MANUAL THERAPY 1/> REGIONS: CPT | Performed by: PHYSICAL THERAPIST

## 2018-07-24 PROCEDURE — 97110 THERAPEUTIC EXERCISES: CPT | Performed by: PHYSICAL THERAPIST

## 2018-07-24 PROCEDURE — 97112 NEUROMUSCULAR REEDUCATION: CPT | Performed by: PHYSICAL THERAPIST

## 2018-07-24 NOTE — PROGRESS NOTES
Daily Note     Today's date: 2018  Patient name: Elizabeth Ramirez  : 1945  MRN: [id number]  Referring provider: Tena Ervin DO  Dx:   Encounter Diagnosis     ICD-10-CM    1  Low back pain, unspecified back pain laterality, unspecified chronicity, with sciatica presence unspecified M54 5                   Subjective: Pt reports that her back is feeling good today, however, her legs get very tired after standing doing the dishes or after going to the grocery store  Also reports that the start of Parkinson's probably isn't helping her situation  Objective: See treatment diary below    Precautions: HTN, osteopenia, anxiety, essential tremor     Daily Treatment Diary      Manuals                    STM Lumbar PSM B    8'  10'  13'                                                                                       Exercise Diary                        LTR 10x10'' P! np                   SKTC 10x10'' 10x10''  10x10''  10x10''               Seated HS stretch 5x20'' 5x20''    5x20''               Supine hip ADD/ABD nv  2x10 RTB  [id number]'', RTB  [id number]'', GTB               STS nv  nv nv  nv               NBOS EO/EC nv  nv  nv  nv               Recumbent bike    10' lvl 0  5' lvl 0  np               PPT   10x10''   10x10''  10x10''               SLR w/ PPT   x10 ea   x10 ea  2x10               Supine HS stretch     10x10''  np               Seated marches        2x10               Seated HR/TR         2x10                                                                                                                                                                                                                                       Modalities                                                                          Assessment: Tolerated treatment well  Patient demonstrated fatigue post treatment  Pt with much greater fatigue level than previous visits, difficulty completing increased TE   Verbal cueing to correct form during TE  Updated HEP to reflect changes and emphasized importance to continue HEP at home  Plan: Continue per plan of care

## 2018-07-31 ENCOUNTER — OFFICE VISIT (OUTPATIENT)
Dept: PHYSICAL THERAPY | Facility: CLINIC | Age: 73
End: 2018-07-31
Payer: MEDICARE

## 2018-07-31 DIAGNOSIS — M54.5 LOW BACK PAIN, UNSPECIFIED BACK PAIN LATERALITY, UNSPECIFIED CHRONICITY, WITH SCIATICA PRESENCE UNSPECIFIED: Primary | ICD-10-CM

## 2018-07-31 PROCEDURE — 97110 THERAPEUTIC EXERCISES: CPT | Performed by: PHYSICAL THERAPIST

## 2018-07-31 PROCEDURE — 97140 MANUAL THERAPY 1/> REGIONS: CPT | Performed by: PHYSICAL THERAPIST

## 2018-07-31 PROCEDURE — 97112 NEUROMUSCULAR REEDUCATION: CPT | Performed by: PHYSICAL THERAPIST

## 2018-07-31 NOTE — PROGRESS NOTES
Daily Note     Today's date: 2018  Patient name: Rojelio Cesar  : 1945  MRN: [id number]  Referring provider: Wenceslao Fitzpatrick DO  Dx:   Encounter Diagnosis     ICD-10-CM    1  Low back pain, unspecified back pain laterality, unspecified chronicity, with sciatica presence unspecified M54 5                   Subjective: Pt reports that her low back is sore upon arrival, doesn't report any change in activity level  Objective: See treatment diary below    Precautions: HTN, osteopenia, anxiety, essential tremor     Daily Treatment Diary      Manuals                  STM Lumbar PSM B    8'  10'  13'  13'                                                                                     Exercise Diary                        LTR 10x10'' P! np                   SKTC 10x10'' 10x10''  10x10''  10x10''  10x10''             Seated HS stretch 5x20'' 5x20''    5x20''  5x20''             Supine hip ADD/ABD nv  2x10 RTB  [id number]'', RTB  [id number]'', GTB [id number]'', GTB              STS nv  nv nv  nv               NBOS EO/EC nv  nv  nv  nv  3x20''             Recumbent bike    10' lvl 0  5' lvl 0  np               PPT   10x10''   10x10''  10x10''  10x10''             SLR w/ PPT   x10 ea   x10 ea  2x10  2x10 ea             Supine HS stretch     10x10''  np               Seated marches        2x10 2x10              Seated HR/TR         2x10 2x10 ea              Ball roll outs         10x10''             Seated palloff press         x10 ea GTB             Step ups         x10 ea              LAQ         x10 2#                                                                                                                                     Modalities                                                                          Assessment: Tolerated treatment well  Patient demonstrated fatigue post treatment  Pt responded well to increased TE this session, fatigue at end of session   Continued cueing to promote upright posture and eccentric control during TE  Will continue to progress nv as allowed by pain  Plan: Continue per plan of care

## 2018-08-07 ENCOUNTER — OFFICE VISIT (OUTPATIENT)
Dept: PHYSICAL THERAPY | Facility: CLINIC | Age: 73
End: 2018-08-07
Payer: MEDICARE

## 2018-08-07 DIAGNOSIS — M54.5 LOW BACK PAIN, UNSPECIFIED BACK PAIN LATERALITY, UNSPECIFIED CHRONICITY, WITH SCIATICA PRESENCE UNSPECIFIED: Primary | ICD-10-CM

## 2018-08-07 PROCEDURE — G8979 MOBILITY GOAL STATUS: HCPCS | Performed by: PHYSICAL THERAPIST

## 2018-08-07 PROCEDURE — 97164 PT RE-EVAL EST PLAN CARE: CPT | Performed by: PHYSICAL THERAPIST

## 2018-08-07 PROCEDURE — G8978 MOBILITY CURRENT STATUS: HCPCS | Performed by: PHYSICAL THERAPIST

## 2018-08-07 NOTE — PROGRESS NOTES
PT Re-Evaluation     Today's date: 2018  Patient name: Saundra Preston  : 1945  MRN: [id number]  Referring provider: Martina Espana DO  Dx:   Encounter Diagnosis     ICD-10-CM    1  Low back pain, unspecified back pain laterality, unspecified chronicity, with sciatica presence unspecified M54 5                   Assessment  Impairments: abnormal gait, abnormal muscle firing, abnormal muscle tone, abnormal or restricted ROM, activity intolerance, lacks appropriate home exercise program, pain with function and poor posture     Assessment details: Patient is a 74y o  year old female that presents with low back pain  Patient's FOTO improved by 4 to 49/100  Patient has shown minimal improvements in PT and continues to present with pain, decreased ROM, decreased strength, and decreased tolerance to activity  Pt expresses that she has not been compliant with her exercises at home but wishes to take a break from PT at this time, she will be placed on hold  Patient would benefit from a one time visit to facilitate a transition to a home program at this time  Understanding of Dx/Px/POC: excellent  Goals  Impairment Goals:  1 ) Pt will have decreased sx during her normal day by 50% in 3-4 weeks  NOT MET  2 ) Pt will have improve active lumbar flexion range of motion by 10 degrees in 4-6 weeks  NOT MET  3 ) Pt will have improved hip strength throughout by 1/2 MMT in 4-6 weeks  NOT MET  4 ) Pt will have decreased tenderness to palpation to lumbar paraspinal musculature by 50% in 4-6 weeks  NOT MET    Functional Goals:  1 ) Pt will be independent in their home exercise program in 1 week  NOT MET  2 ) Pt will be able to get up out of bed with 50% less pain in 3-4 weeks  NOT MET  3 ) Pt will be able to complete all ADL's without pain in 6-8 weeks  NOT MET      Plan  Patient would benefit from: skilled PT  Planned therapy interventions: home exercise program  Duration in visits: 1  Plan of Care expiration date: 2018  Treatment plan discussed with: patient  Plan details: Pt will be placed on hold at this time  Subjective Evaluation    History of Present Illness  Mechanism of injury: Pt presents explaining that she does not think her back has improved at all since starting therapy  She reports that she has not completed any of the home exercises but is requesting that we take a break from PT because its not for her at this point  Despite the exercises making things worse, she reports that her back pain has improved after she first gets up in the morning  Pt continues to explain that walking and standing for periods of time cause her to get fatigued and sore in her low back and legs and notes that she wants to see what happens when she takes a break from PT  During conversation she admits that she knows she should keep moving and makes a note that if taking a break doesn't help she will come back to therapy at an increase rate of visits  Education and explanation provided to pt the importance of continued therapy and that changes will not be present if exercises are not incorporated into her routine  Recurrent probem    Pain  Current pain ratin  At best pain ratin  At worst pain ratin  Quality: sharp and dull ache  Relieving factors: change in position, heat, medications and rest  Aggravating factors: walking and standing    Patient Goals  Patient goals for therapy: decreased pain          Objective     Special Questions  Negative for night pain, disturbed sleep, bladder dysfunction, bowel dysfunction and saddle (S4) numbness    Static Posture     Head  Forward  Shoulders  Rounded  Postural Observations  Seated posture: fair  Standing posture: fair        Palpation   Left   Hypertonic in the erector spinae  Tenderness of the erector spinae  Right   Hypertonic in the erector spinae  Tenderness of the erector spinae  Cervical Spine Comments  Left erector spinae: L1-5   Right erector spinae: L1-5  Tenderness     Lumbar Spine  Tenderness in the spinous process and facet joint  Neurological Testing     Sensation     Lumbar   Left   Intact: light touch    Right   Intact: light touch    Reflexes   Left   Patellar (L4): trace (1+)  Achilles (S1): trace (1+)    Right   Patellar (L4): trace (1+)  Achilles (S1): trace (1+)    Active Range of Motion     Lumbar   Flexion: Active lumbar flexion: Mod    Extension: Active lumbar extension: Mod  with pain  Left lateral flexion: Active left lumbar lateral flexion: Mod    Right lateral flexion: Active right lumbar lateral flexion: Mod    Left rotation: Active left lumbar rotation: Min  Right rotation: Active right lumbar rotation: Min  Passive Range of Motion     Additional Passive Range of Motion Details  Hypomobility noted throughout the lumbar spine (PA/Uni) with TTP during assessment  Strength/Myotome Testing     Left Hip   Planes of Motion   Flexion: 3+  Extension: 3+  Abduction: 3+    Right Hip   Planes of Motion   Flexion: 3+  Extension: 3+  Abduction: 3+    Left Knee   Flexion: 3+  Extension: 4-    Right Knee   Flexion: 3+  Extension: 4-    Left Ankle/Foot   Dorsiflexion: 3+  Plantar flexion: 3+    Right Ankle/Foot   Dorsiflexion: 3+  Plantar flexion: 3+    Tests       Thoracic   Negative slump  Lumbar   Negative slump  Ambulation     Observational Gait   Decreased walking speed and stride length  Functional Assessment     Comments  Pt with intense pain upon lying in supine position, great improvement in sx with supine hook lying position  Resting tremor noted in R UE while seated and in R LE when sitting with legs hanging off edge of bed      General Comments     Lumbar Comments  HS 90/90: lacking 15 degrees knee extension B        Precautions: HTN, osteopenia, anxiety, essential tremor     Daily Treatment Diary      Manuals    6/19 7/17 7/24 7/31              STM Lumbar PSM B    8'  10'  13'  13'                                                                                     Exercise Diary                        LTR 10x10'' P! np                   SKTC 10x10'' 10x10''  10x10''  10x10''  10x10''             Seated HS stretch 5x20'' 5x20''    5x20''  5x20''             Supine hip ADD/ABD nv  2x10 RTB  [id number]'', RTB  [id number]'', GTB 7d48w24'', GTB              STS nv  nv nv  nv               NBOS EO/EC nv  nv  nv  nv  3x20''             Recumbent bike    10' lvl 0  5' lvl 0  np               PPT   10x10''   10x10''  10x10''  10x10''             SLR w/ PPT   x10 ea   x10 ea  2x10  2x10 ea             Supine HS stretch     10x10''  np               Seated marches        2x10 2x10              Seated HR/TR         2x10 2x10 ea              Ball roll outs         10x10''             Seated palloff press         x10 ea GTB             Step ups         x10 ea              LAQ         x10 2#                                                                                                                                     Modalities

## 2018-08-20 ENCOUNTER — OFFICE VISIT (OUTPATIENT)
Dept: NEUROLOGY | Facility: CLINIC | Age: 73
End: 2018-08-20
Payer: MEDICARE

## 2018-08-20 VITALS
BODY MASS INDEX: 29.06 KG/M2 | SYSTOLIC BLOOD PRESSURE: 170 MMHG | HEART RATE: 59 BPM | HEIGHT: 60 IN | DIASTOLIC BLOOD PRESSURE: 77 MMHG | WEIGHT: 148 LBS

## 2018-08-20 DIAGNOSIS — G20 PARKINSON DISEASE (HCC): Primary | ICD-10-CM

## 2018-08-20 PROBLEM — G20.A1 PARKINSON DISEASE: Status: ACTIVE | Noted: 2018-08-20

## 2018-08-20 PROCEDURE — 99205 OFFICE O/P NEW HI 60 MIN: CPT | Performed by: PSYCHIATRY & NEUROLOGY

## 2018-08-20 RX ORDER — SENNA PLUS 8.6 MG/1
1 TABLET ORAL DAILY PRN
COMMUNITY
End: 2019-08-29 | Stop reason: ALTCHOICE

## 2018-08-20 RX ORDER — ASPIRIN 81 MG/1
81 TABLET ORAL DAILY
COMMUNITY
End: 2019-05-29

## 2018-08-20 NOTE — PROGRESS NOTES
Assessment/Plan:    Parkinson disease ([facility] )  74year old right handed woman presents for second opinion for possible parkinson's disease  The patient has a history of progressive unilateral tremor along with generalized slowness, anosmia, significant constipation and some possible depression  On examination she has asymetric rigidity R>L, rest tremor which emerges when walking and focal bradykinesia with decrement  All of this is consistent with the diagnosis of Parkinson's disease  The patient is currently doing will on Azilect and reports that her symptoms are sufficiently well controlled  We discussed some details of Parkinson's disease including prognosis and management  I encouraged her to increase her physical activity and consider restarting PT  Diagnoses and all orders for this visit:    Parkinson disease ([facility] )    Other orders  -     esomeprazole (NexIUM) 20 mg capsule; Take 20 mg by mouth every morning before breakfast  -     senna (SENOKOT) 8 6 MG tablet; Take 1 tablet by mouth daily  -     aspirin (ECOTRIN LOW STRENGTH) 81 mg EC tablet; Take 81 mg by mouth daily    Total time spent today 60 minutes  Greater than 50% of total time was spent with the patient and / or family counseling and / or coordination of care      Subjective: Ronak Osuna is a 74year-old right-handed who presents for a second opinion regarding possible Parkinson's disease  She is accompanied by her  and daughter  They note that symptoms started around 2016 with right arm tremor  The tremor has since spread into there leg  The patient has significant low back pain and this confounds some of her symptoms to some degree  She was seen by one neurologist in the past who diagnosed her with Parkinson's disease and started her on Azilect  She has not been on any other medication for PD  She does find that the Azilect has been helpful for her tremor       Exposure to neuroleptics, pesticides, toxins, metals: No    Tremor: Right arm and leg  Slowness: yes, but not a major issue  Stiffness: not major   Dystonia: no   Changes in facial expression: masked  Changes in voice: sometimes softer, sometimes sounds slurred on the phone  Changes in writing: no   Changes in gait: some issues from low back pain  Leg pain, slower walking   Falls: no   Freezing: no   Trouble with swallowing: no   Clumsiness/dexterity: no     Anosmia: yes  Constipation: significant   Urinary sx: some frequency   Lightheadedness: no   Changes in Mood: fells down sometimes  Trouble with sleep: sleeping well     REM behavior Disorder: talks sometimes  Sleep apnea sx: snores  No apnea   Memory trouble: some mild issues  No issues at work  Hallucinations: no     Medications and timing:   - Rasaglinine 1mg, 1 tab daily      The following portions of the patient's history were reviewed and updated as appropriate: allergies, current medications, past family history, past medical history, past social history, past surgical history and problem list       Objective:      /77 (BP Location: Left arm, Patient Position: Standing)   Pulse 59   Ht 5' (1 524 m)   Wt 67 1 kg (148 lb)   BMI 28 90 kg/m²     GENERAL MEDICAL EXAMINATION:  General appearance: alert, in no apparent distress  Appropriately dressed and groomed  Conversing and interacting appropriately  HEENT: Sclera are non-injected  Mucous membranes are moist    CV: Heart rate is regular  Pulmonary: Breathing comfortably on RA   Musculoskeletal: No evidence of deformities  No contractures  No Edema  Skin: No visible rashes  Warm and well perfused  NEUROLOGICAL EXAMINATION:  Mental Status: Alert and oriented to person place and time  Able to relate history without difficulty  Attentive: able to name PRESTON backward without difficulty  Language is fluent and appropriate with normal prosody  There were no paraphasic errors  Speech was not dysarthric  There was no pallilalia noted   Able to follow both midline and appendicular commands  Cranial Nerves:  I: not tested  II:  pupils equally round and briskly reactive to light, both directly and consensually  III-IV-VI: Full and conjugate, extra-ocular eye movements in all directions of gaze  No nystagmus or diplopia  Normal horizontal and vertical saccades (initiation and velocity)  Decreased smooth pursuit  VII: Face is symmetric at rest and with activation; symmetric speed and excursion with smile  IX-X: Palate elevates symmetrically  XII: No tongue deviation or fasciculations    Motor: Overall mild reduction in spontaneous movements  Normal muscle bulk throughout  Normal axial tone  Mild rigidity in the right arm and slight in the left arm  mild hypomimia was noted  Speech was mildly hypophonic  There was a 4cm tremor noted at rest in the right hand and 2cm in the right leg  No tremor with posture or actoin  There was no vocal tremor or head tremor  Rapid alternating movements revealed mild to moderate focal bradykinesia in the bilateral arms and legs R>L  There were no dyskinesias or dystonia noted  No pronator drift or rebound  No asterixis or myoclonus noted  Coordination: Finger to nose without dysmetria bilaterally  No intention tremor  Gait: Able to rise from a chair without the use of arms  Posture was normal  Narrow-base and stable stance  Normal initiation  Mildly decreased stride length and step height with significant decrease in arm swing and reemergence of right hand tremor  Turn completed in 3 steps  Physical Exam    Neurological Exam    Review of Systems   Constitutional: Negative  Negative for appetite change and fever  HENT: Positive for hearing loss  Negative for tinnitus, trouble swallowing and voice change  Eyes: Negative  Negative for photophobia and pain  Respiratory: Negative  Negative for shortness of breath  Cardiovascular: Negative  Negative for palpitations     Gastrointestinal: Negative  Negative for nausea and vomiting  Bowel changes     Endocrine: Negative  Negative for cold intolerance and heat intolerance  Genitourinary: Negative  Negative for dysuria, frequency and urgency  Musculoskeletal: Positive for back pain and neck pain  Negative for myalgias  Pain while walking   Skin: Negative  Negative for rash  Neurological: Positive for tremors  Negative for dizziness, seizures, syncope, facial asymmetry, speech difficulty, weakness, light-headedness, numbness and headaches  Cramping     Hematological: Bruises/bleeds easily  Psychiatric/Behavioral: Positive for sleep disturbance (snoring)  Negative for confusion and hallucinations

## 2018-08-20 NOTE — ASSESSMENT & PLAN NOTE
74year old right handed woman presents for second opinion for possible parkinson's disease  The patient has a history of progressive unilateral tremor along with generalized slowness, anosmia, significant constipation and some possible depression  On examination she has asymetric rigidity R>L, rest tremor which emerges when walking and focal bradykinesia with decrement  All of this is consistent with the diagnosis of Parkinson's disease  The patient is currently doing will on Azilect and reports that her symptoms are sufficiently well controlled  We discussed some details of Parkinson's disease including prognosis and management  I encouraged her to increase her physical activity and consider restarting PT

## 2018-08-20 NOTE — LETTER
August 20, 2018     Iris Bai DO  [address]  [address]  [facility] [address]    Patient: Bernie Rivera   YOB: 1945   Date of Visit: 8/20/2018       Dear Dr Cezar Lambert: Thank you for referring Gabbie Kunz to me for evaluation  Below are my notes for this consultation  If you have questions, please do not hesitate to call me  I look forward to following your patient along with you  Sincerely,        Izabella Sanders MD        CC: No Recipients  Izabella Sanders MD  8/20/2018  3:20 PM  Sign at close encounter  Assessment/Plan:    Parkinson disease [facility])  74year old right handed woman presents for second opinion for possible parkinson's disease  The patient has a history of progressive unilateral tremor along with generalized slowness, anosmia, significant constipation and some possible depression  On examination she has asymetric rigidity R>L, rest tremor which emerges when walking and focal bradykinesia with decrement  All of this is consistent with the diagnosis of Parkinson's disease  The patient is currently doing will on Azilect and reports that her symptoms are sufficiently well controlled  We discussed some details of Parkinson's disease including prognosis and management  I encouraged her to increase her physical activity and consider restarting PT  Diagnoses and all orders for this visit:    Parkinson disease ([address] )    Other orders  -     esomeprazole (NexIUM) 20 mg capsule; Take 20 mg by mouth every morning before breakfast  -     senna (SENOKOT) 8 6 MG tablet; Take 1 tablet by mouth daily  -     aspirin (ECOTRIN LOW STRENGTH) 81 mg EC tablet; Take 81 mg by mouth daily        Subjective:     Patient ID:     Gabbie Kunz is a 74year-old right-handed who presents for a second opinion regarding possible Parkinson's disease  She is accompanied by her  and daughter  They note that symptoms started around 2016 with right arm tremor   The tremor has since spread into there leg  The patient has significant low back pain and this confounds some of her symptoms to some degree  She was seen by one neurologist in the past who diagnosed her with Parkinson's disease and started her on Azilect  She has not been on any other medication for PD  She does find that the Azilect has been helpful for her tremor  Exposure to neuroleptics, pesticides, toxins, metals: No    Tremor: Right arm and leg  Slowness: yes, but not a major issue  Stiffness: not major   Dystonia: no   Changes in facial expression: masked  Changes in voice: sometimes softer, sometimes sounds slurred on the phone  Changes in writing: no   Changes in gait: some issues from low back pain  Leg pain, slower walking   Falls: no   Freezing: no   Trouble with swallowing: no   Clumsiness/dexterity: no     Anosmia: yes  Constipation: significant   Urinary sx: some frequency   Lightheadedness: no   Changes in Mood: fells down sometimes  Trouble with sleep: sleeping well     REM behavior Disorder: talks sometimes  Sleep apnea sx: snores  No apnea   Memory trouble: some mild issues  No issues at work  Hallucinations: no     Medications and timing:   - Rasaglinine 1mg, 1 tab daily      The following portions of the patient's history were reviewed and updated as appropriate: allergies, current medications, past family history, past medical history, past social history, past surgical history and problem list       Objective:      /77 (BP Location: Left arm, Patient Position: Standing)   Pulse 59   Ht 5' (1 524 m)   Wt 67 1 kg (148 lb)   BMI 28 90 kg/m²      GENERAL MEDICAL EXAMINATION:  General appearance: alert, in no apparent distress  Appropriately dressed and groomed  Conversing and interacting appropriately  HEENT: Sclera are non-injected  Mucous membranes are moist    CV: Heart rate is regular  Pulmonary: Breathing comfortably on RA   Musculoskeletal: No evidence of deformities  No contractures   No Edema    Skin: No visible rashes  Warm and well perfused  NEUROLOGICAL EXAMINATION:  Mental Status: Alert and oriented to person place and time  Able to relate history without difficulty  Attentive: able to name PRESTON backward without difficulty  Language is fluent and appropriate with normal prosody  There were no paraphasic errors  Speech was not dysarthric  There was no pallilalia noted  Able to follow both midline and appendicular commands  Cranial Nerves:  I: not tested  II:  pupils equally round and briskly reactive to light, both directly and consensually  III-IV-VI: Full and conjugate, extra-ocular eye movements in all directions of gaze  No nystagmus or diplopia  Normal horizontal and vertical saccades (initiation and velocity)  Decreased smooth pursuit  VII: Face is symmetric at rest and with activation; symmetric speed and excursion with smile  IX-X: Palate elevates symmetrically  XII: No tongue deviation or fasciculations    Motor: Overall mild reduction in spontaneous movements  Normal muscle bulk throughout  Normal axial tone  Mild rigidity in the right arm and slight in the left arm  mild hypomimia was noted  Speech was mildly hypophonic  There was a 4cm tremor noted at rest in the right hand and 2cm in the right leg  No tremor with posture or actoin  There was no vocal tremor or head tremor  Rapid alternating movements revealed mild to moderate focal bradykinesia in the bilateral arms and legs R>L  There were no dyskinesias or dystonia noted  No pronator drift or rebound  No asterixis or myoclonus noted  Coordination: Finger to nose without dysmetria bilaterally  No intention tremor  Gait: Able to rise from a chair without the use of arms  Posture was normal  Narrow-base and stable stance  Normal initiation  Mildly decreased stride length and step height with significant decrease in arm swing and reemergence of right hand tremor  Turn completed in 3 steps       Physical Exam    Neurological Exam    Review of Systems   Constitutional: Negative  Negative for appetite change and fever  HENT: Positive for hearing loss  Negative for tinnitus, trouble swallowing and voice change  Eyes: Negative  Negative for photophobia and pain  Respiratory: Negative  Negative for shortness of breath  Cardiovascular: Negative  Negative for palpitations  Gastrointestinal: Negative  Negative for nausea and vomiting  Bowel changes     Endocrine: Negative  Negative for cold intolerance and heat intolerance  Genitourinary: Negative  Negative for dysuria, frequency and urgency  Musculoskeletal: Positive for back pain and neck pain  Negative for myalgias  Pain while walking   Skin: Negative  Negative for rash  Neurological: Positive for tremors  Negative for dizziness, seizures, syncope, facial asymmetry, speech difficulty, weakness, light-headedness, numbness and headaches  Cramping     Hematological: Bruises/bleeds easily  Psychiatric/Behavioral: Positive for sleep disturbance (snoring)  Negative for confusion and hallucinations

## 2018-08-20 NOTE — PATIENT INSTRUCTIONS
Parkinson's Disease Resources     Helpful web sites   -  www ana Cassidy  org (the Dov)   -  [address] [facility] ([address] for [employer]) - Order the "Every Victory Counts" leanne under the "resources" tab  Parkinsons Specific Exercise/Therapy Groups     [facility] BIG and LOUD Programs    BIG and LOUD  [address]  [facility], [place]  [address] [facility] 83,[address] 5  [facility], [address]  [phone]    BIG and [address] [facility] [address], [address]  [phone]    BIG and [address] [facility] pass, [address]  [facility] [employer]    BIG and LOUD  [address] [place] [employer] [facility], [address] [facility] [phone]    Coryell Steady Boxing  Tuesday & Thursdays 3:30pm - 5:00pm  [facility]  [address] [place] [address]  [place], [address]      [address] [address]  Phone: [phone]  Email: [email or web address]      Get Up and Go  Mondays 10:30-11:30 am, Thursdays 12:00-1:00 pm   [address]  [place], [address]  [address] [place] [address]  Tuesdays and Thursdays, 3:30 - 5:00 p m  Get S M A R T  [employer]  Tuesdays and Mondays, 1 - 1:45 p m

## 2018-08-27 DIAGNOSIS — F41.9 ANXIETY: ICD-10-CM

## 2018-08-27 RX ORDER — ALPRAZOLAM 0.25 MG/1
0.25 TABLET ORAL 3 TIMES DAILY PRN
Qty: 50 TABLET | Refills: 1 | Status: SHIPPED | OUTPATIENT
Start: 2018-08-27 | End: 2018-11-08 | Stop reason: SDUPTHER

## 2018-11-08 DIAGNOSIS — F41.9 ANXIETY: ICD-10-CM

## 2018-11-08 RX ORDER — ALPRAZOLAM 0.25 MG/1
0.25 TABLET ORAL 3 TIMES DAILY PRN
Qty: 90 TABLET | Refills: 1 | Status: SHIPPED | OUTPATIENT
Start: 2018-11-08 | End: 2019-02-11 | Stop reason: SDUPTHER

## 2018-11-16 DIAGNOSIS — G20 PARKINSON'S DISEASE (HCC): Primary | ICD-10-CM

## 2018-11-16 RX ORDER — RASAGILINE 1 MG/1
TABLET ORAL
Qty: 90 TABLET | Refills: 1 | Status: SHIPPED | OUTPATIENT
Start: 2018-11-16 | End: 2019-05-20 | Stop reason: SDUPTHER

## 2018-11-16 NOTE — TELEPHONE ENCOUNTER
Pt called requesting azilect refill  She takes 1 mg, 1 tab daily  It was initially prescribed by her previous neurologist  Refill request entered  If agreeable, pls sign off        Thanks

## 2018-12-10 ENCOUNTER — OFFICE VISIT (OUTPATIENT)
Dept: FAMILY MEDICINE CLINIC | Facility: CLINIC | Age: 73
End: 2018-12-10
Payer: MEDICARE

## 2018-12-10 VITALS
WEIGHT: 152.8 LBS | HEART RATE: 60 BPM | DIASTOLIC BLOOD PRESSURE: 98 MMHG | TEMPERATURE: 98.3 F | HEIGHT: 60 IN | SYSTOLIC BLOOD PRESSURE: 140 MMHG | BODY MASS INDEX: 30 KG/M2

## 2018-12-10 DIAGNOSIS — R05.8 POST-VIRAL COUGH SYNDROME: Primary | ICD-10-CM

## 2018-12-10 PROCEDURE — 99213 OFFICE O/P EST LOW 20 MIN: CPT | Performed by: FAMILY MEDICINE

## 2018-12-10 NOTE — PROGRESS NOTES
Nivia Monroe 1945 female MRN: [id number]    Family Medicine Acute Visit    ASSESSMENT/PLAN  Problem List Items Addressed This Visit        Respiratory    Post-viral cough syndrome - Primary     Resolving viral illness with cough  Exam normal  Supportive care, may use OTC musinex, cough drops  Offered cough syrup patient was not interested  RTC if worsening or not improved                      Future Appointments  Date Time Provider Misty Holderi   2019 10:30 AM Claire Blair MD [facility]          SUBJECTIVE  CC: Cough (Cough for 1 week   )      HPI:  Nivia Monroe is a 74 y o  female who presents for sick visit  Patient states 1 week of cough,  was sick last week  States dry cough, but does have green mucous out of her nose  Denies fever or chills  No travel  No GI complaints or other issues today  No COPD or asthma  Former smoker, quit "many years ago"  Been using cough drops  Complains of back pain which is chronic, no other concerns  Review of Systems   Constitutional: Negative for chills and fever  HENT: Positive for rhinorrhea  Negative for congestion, sinus pain, sinus pressure and sore throat  Eyes: Negative for photophobia and visual disturbance  Respiratory: Positive for cough  Negative for shortness of breath and wheezing  Cardiovascular: Negative for chest pain and palpitations  Gastrointestinal: Negative for abdominal pain, constipation, diarrhea, nausea and vomiting  Musculoskeletal: Positive for back pain  Skin: Negative for color change and rash  Neurological: Negative for dizziness, light-headedness and headaches         Historical Information   The patient history was reviewed as follows:  Past Medical History:   Diagnosis Date    Constipation     Hyperlipidemia     Hypertension          Past Surgical History:   Procedure Laterality Date     SECTION      CHOLECYSTECTOMY       Family History   Problem Relation Age of Onset    Tremor Neg Hx     Parkinsonism Neg Hx       Social History   History   Alcohol Use    Yes     Comment: Socially     History   Drug Use No     History   Smoking Status    Former Smoker   Smokeless Tobacco    Never Used       Medications:     Current Outpatient Prescriptions:     ALPRAZolam (XANAX) 0 25 mg tablet, Take 1 tablet (0 25 mg total) by mouth 3 (three) times a day as needed for anxiety, Disp: 90 tablet, Rfl: 1    aspirin (ECOTRIN LOW STRENGTH) 81 mg EC tablet, Take 81 mg by mouth daily, Disp: , Rfl:     docusate sodium (COLACE) 100 mg capsule, Take 100 mg by mouth 2 (two) times a day , Disp: , Rfl:     esomeprazole (NexIUM) 20 mg capsule, Take 20 mg by mouth every morning before breakfast, Disp: , Rfl:     ibuprofen (MOTRIN) 200 mg tablet, Take by mouth, Disp: , Rfl:     latanoprost (XALATAN) 0 005 % ophthalmic solution, , Disp: , Rfl:     metoprolol tartrate (LOPRESSOR) 50 mg tablet, Take 50 mg by mouth 2 (two) times a day , Disp: , Rfl:     omeprazole (PriLOSEC) 20 mg delayed release capsule, Take 1 capsule by mouth daily, Disp: , Rfl:     pravastatin (PRAVACHOL) 40 mg tablet, Take 40 mg by mouth daily  , Disp: , Rfl:     rasagiline (AZILECT) 1 MG, Take 1 tablet by mouth daily, Disp: 90 tablet, Rfl: 1    senna (SENOKOT) 8 6 MG tablet, Take 1 tablet by mouth daily, Disp: , Rfl:     timolol (BETIMOL) 0 5 % ophthalmic solution, Administer 1 drop to both eyes daily  , Disp: , Rfl:     No Known Allergies    OBJECTIVE  Vitals:   Vitals:    12/10/18 1325   BP: 140/98   BP Location: Right arm   Patient Position: Sitting   Cuff Size: Standard   Pulse: 60   Temp: 98 3 °F (36 8 °C)   TempSrc: Tympanic   Weight: 69 3 kg (152 lb 12 8 oz)   Height: 5' (1 524 m)         Physical Exam   Constitutional: She is oriented to person, place, and time  She appears well-developed and well-nourished  HENT:   Head: Normocephalic and atraumatic     Mouth/Throat: Oropharynx is clear and moist    Eyes: Pupils are equal, round, and reactive to light  Neck: Normal range of motion  Neck supple  Cardiovascular: Normal rate, regular rhythm and normal heart sounds  Pulmonary/Chest: Effort normal and breath sounds normal  No respiratory distress  She has no wheezes  Abdominal: Soft  Bowel sounds are normal  She exhibits no distension  There is no tenderness  Musculoskeletal: Normal range of motion  She exhibits no edema or tenderness  Neurological: She is alert and oriented to person, place, and time  Skin: Skin is warm and dry  Psychiatric: She has a normal mood and affect   Her behavior is normal                   Dominick Ma DO    12/10/2018

## 2018-12-10 NOTE — ASSESSMENT & PLAN NOTE
Resolving viral illness with cough  Exam normal  Supportive care, may use OTC musinex, cough drops  Offered cough syrup patient was not interested  RTC if worsening or not improved

## 2019-01-14 ENCOUNTER — OFFICE VISIT (OUTPATIENT)
Dept: FAMILY MEDICINE CLINIC | Facility: CLINIC | Age: 74
End: 2019-01-14
Payer: MEDICARE

## 2019-01-14 VITALS
RESPIRATION RATE: 18 BRPM | WEIGHT: 154.4 LBS | HEART RATE: 73 BPM | BODY MASS INDEX: 30.31 KG/M2 | OXYGEN SATURATION: 96 % | SYSTOLIC BLOOD PRESSURE: 138 MMHG | DIASTOLIC BLOOD PRESSURE: 88 MMHG | TEMPERATURE: 98.1 F | HEIGHT: 60 IN

## 2019-01-14 DIAGNOSIS — Z00.00 MEDICARE ANNUAL WELLNESS VISIT, INITIAL: Primary | ICD-10-CM

## 2019-01-14 DIAGNOSIS — Z79.899 HIGH RISK MEDICATION USE: ICD-10-CM

## 2019-01-14 DIAGNOSIS — Z12.39 SCREENING FOR BREAST CANCER: ICD-10-CM

## 2019-01-14 DIAGNOSIS — M54.50 ACUTE BILATERAL LOW BACK PAIN WITHOUT SCIATICA: ICD-10-CM

## 2019-01-14 DIAGNOSIS — Z13.6 SCREENING FOR CARDIOVASCULAR CONDITION: ICD-10-CM

## 2019-01-14 PROCEDURE — G0402 INITIAL PREVENTIVE EXAM: HCPCS | Performed by: FAMILY MEDICINE

## 2019-01-14 PROCEDURE — 99214 OFFICE O/P EST MOD 30 MIN: CPT | Performed by: FAMILY MEDICINE

## 2019-01-14 RX ORDER — METHYLPREDNISOLONE 4 MG/1
TABLET ORAL
Qty: 21 TABLET | Refills: 0 | Status: SHIPPED | OUTPATIENT
Start: 2019-01-14 | End: 2019-02-13 | Stop reason: ALTCHOICE

## 2019-01-14 RX ORDER — CYCLOBENZAPRINE HCL 5 MG
5 TABLET ORAL
Qty: 10 TABLET | Refills: 0 | Status: SHIPPED | OUTPATIENT
Start: 2019-01-14 | End: 2019-02-13 | Stop reason: SDUPTHER

## 2019-01-14 NOTE — PROGRESS NOTES
Assessment/Plan:         Diagnoses and all orders for this visit:    Medicare annual wellness visit, initial    Screening for cardiovascular condition  -     CBC and differential; Future  -     Comprehensive metabolic panel; Future  -     Lipid panel; Future  -     Urinalysis with reflex to microscopic    High risk medication use  -     CBC and differential; Future  -     Comprehensive metabolic panel; Future  -     Urinalysis with reflex to microscopic    Screening for breast cancer  -     Mammo screening bilateral w cad; Future        Labs ordered  Mammogram ordered  Medicare wellness visit completed  See other note for acute and chronic issues    Subjective:     CC: Medicare Wellness Visit       Patient ID: Neila Landau is a 74 y o  female  Patient here for Medicare wellness visit  See other note for acute and chronic issues        The following portions of the patient's history were reviewed and updated as appropriate: allergies, current medications, past family history, past medical history, past social history, past surgical history and problem list     Review of Systems   Constitutional: Negative  HENT: Negative  Eyes: Negative  Respiratory: Negative  Cardiovascular: Negative  Gastrointestinal: Negative  Negative for bowel incontinence  Endocrine: Negative  Genitourinary: Negative  Musculoskeletal: Negative  Skin: Negative  Allergic/Immunologic: Negative  Neurological: Negative  Hematological: Negative  Psychiatric/Behavioral: Negative  The patient is not nervous/anxious  All other systems reviewed and are negative          Objective:    Vitals:    01/14/19 1355   BP: 138/88   BP Location: Right arm   Patient Position: Sitting   Cuff Size: Standard   Pulse: 73   Resp: 18   Temp: 98 1 °F (36 7 °C)   TempSrc: Tympanic   SpO2: 96%   Weight: 70 kg (154 lb 6 4 oz)   Height: 5' (1 524 m)          Physical Exam   Constitutional: She is oriented to person, place, and time  She appears well-developed and well-nourished  HENT:   Head: Normocephalic and atraumatic  Right Ear: External ear normal    Left Ear: External ear normal    Mouth/Throat: Oropharynx is clear and moist    Eyes: Pupils are equal, round, and reactive to light  Conjunctivae and EOM are normal    Neck: Normal range of motion  Cardiovascular: Normal rate, regular rhythm and normal heart sounds  Pulmonary/Chest: Effort normal and breath sounds normal    Abdominal: Soft  Bowel sounds are normal    Musculoskeletal: Normal range of motion  Very tight musculature in back bilaterally  Muscles tender to touch   Neurological: She is alert and oriented to person, place, and time  She has normal reflexes  Skin: Skin is warm and dry  Psychiatric: She has a normal mood and affect  Her behavior is normal  Judgment and thought content normal    Nursing note and vitals reviewed        Assessment and Plan:    Problem List Items Addressed This Visit        Other    Low back pain    Relevant Medications    Methylprednisolone 4 MG TBPK    cyclobenzaprine (FLEXERIL) 5 mg tablet    Other Relevant Orders    Ambulatory referral to Physical Therapy      Other Visit Diagnoses     Medicare annual wellness visit, initial    -  Primary    Screening for cardiovascular condition        Relevant Orders    CBC and differential    Comprehensive metabolic panel    Lipid panel    Urinalysis with reflex to microscopic    High risk medication use        Relevant Orders    CBC and differential    Comprehensive metabolic panel    Urinalysis with reflex to microscopic    Screening for breast cancer        Relevant Orders    Mammo screening bilateral w cad        Health Maintenance Due   Topic Date Due    Medicare Annual Wellness Visit (AWV)  1945    DTaP,Tdap,and Td Vaccines (1 - Tdap) 05/21/1964    Urinary Incontinence Screening  05/21/2008    Pneumococcal PPSV23/PCV13 65+ Years / Low and Medium Risk (1 of 2 - PCV13) 05/21/2008  CRC Screening: Colonoscopy  2015    INFLUENZA VACCINE  2018    PT PLAN OF CARE  2018         HPI:  Mehran Carreno is a 74 y o  female here for her Initial Wellness Visit  Patient Active Problem List   Diagnosis    Abdominal aortic atherosclerosis ([facility] )    Ambulatory dysfunction    Anemia due to poor nutrition    Bicipital tendinitis of left shoulder    Cataract, left    Chronic anxiety    Chronic constipation    Essential tremor    Gastroesophageal reflux disease    Glaucoma    Hypertension    Iron deficiency anemia    Low back pain    Lumbosacral spinal stenosis    Osteopenia    Spinal stenosis of lumbar region    [facility]    Post-viral cough syndrome     Past Medical History:   Diagnosis Date    Constipation     Hyperlipidemia     Hypertension      Past Surgical History:   Procedure Laterality Date     SECTION      CHOLECYSTECTOMY       Family History   Problem Relation Age of Onset    Tremor Neg Hx     Parkinsonism Neg Hx      History   Smoking Status    Former Smoker   Smokeless Tobacco    Never Used     History   Alcohol Use    Yes     Comment: Socially      History   Drug Use No       Current Outpatient Prescriptions   Medication Sig Dispense Refill    ALPRAZolam (XANAX) 0 25 mg tablet Take 1 tablet (0 25 mg total) by mouth 3 (three) times a day as needed for anxiety 90 tablet 1    aspirin (ECOTRIN LOW STRENGTH) 81 mg EC tablet Take 81 mg by mouth daily      cyclobenzaprine (FLEXERIL) 5 mg tablet Take 1 tablet (5 mg total) by mouth daily at bedtime 10 tablet 0    docusate sodium (COLACE) 100 mg capsule Take 100 mg by mouth 2 (two) times a day        esomeprazole (NexIUM) 20 mg capsule Take 20 mg by mouth every morning before breakfast      ibuprofen (MOTRIN) 200 mg tablet Take by mouth      latanoprost (XALATAN) 0 005 % ophthalmic solution       Methylprednisolone 4 MG TBPK Use as directed on package 21 tablet 0    metoprolol tartrate (LOPRESSOR) 50 mg tablet Take 50 mg by mouth 2 (two) times a day   pravastatin (PRAVACHOL) 40 mg tablet Take 40 mg by mouth daily   rasagiline (AZILECT) 1 MG Take 1 tablet by mouth daily 90 tablet 1    senna (SENOKOT) 8 6 MG tablet Take 1 tablet by mouth daily      timolol (BETIMOL) 0 5 % ophthalmic solution Administer 1 drop to both eyes daily  No current facility-administered medications for this visit  No Known Allergies  There is no immunization history for the selected administration types on file for this patient  Patient Care Team:  Ruth Sanford,  as PCP - General  Mateo Lopez, MANE Morrison, DO    Medicare Screening Tests and Risk Assessments: Micah is here for her Initial Wellness visit  Last Medicare Wellness visit information reviewed, patient interviewed and updates made to the record today  Health Risk Assessment:  Patient rates overall health as fair  Patient feels that their physical health rating is Slightly worse  Eyesight was rated as Same  Hearing was rated as Same  Patient feels that their emotional and mental health rating is Same  Pain experienced by patient in the last 7 days has been Some  Patient's pain rating has been 5/10  Patient states that she has experienced no weight loss or gain in last 6 months  Emotional/Mental Health:  Patient has been feeling nervous/anxious  PHQ-9 Depression Screening:    Frequency of the following problems over the past two weeks:      1  Little interest or pleasure in doing things: 0 - not at all      2  Feeling down, depressed, or hopeless: 0 - not at all  PHQ-2 Score: 0          Broken Bones/Falls: Fall Risk Assessment:    In the past year, patient has experienced: No history of falling in past year          Bladder/Bowel:  Patient has not leaked urine accidently in the last six months  Patient reports no loss of bowel control  Immunizations:  Patient has not had a flu vaccination within the last year  Patient has not received a pneumonia shot  Patient has not received a shingles shot  Patient has not received tetanus/diphtheria shot  (Additional Comments: Declines vaccines)    Home Safety:  Patient does not have trouble with stairs inside or outside of their home  Patient currently reports that there are no safety hazards present in home, working smoke alarms, working carbon monoxide detectors  Preventative Screenings:   No breast cancer screening performed, no colon cancer screen completed, cholesterol screen completed, glaucoma eye exam completed,     Nutrition:  Current diet: Regular with servings of the following:    Medications:  Patient is currently taking over-the-counter supplements  Patient is able to manage medications  Lifestyle Choices:  Patient reports no tobacco use  Patient has not smoked or used tobacco in the past   Patient reports no alcohol use  Patient drives a vehicle  Patient wears seat belt  Activities of Daily Living:  Can get out of bed by his or her self, able to dress self, able to make own meals, able to do own shopping, able to bathe self, can do own laundry/housekeeping, can manage own money, pay bills and track expenses    Previous Hospitalizations:  No hospitalization or ED visit in past 12 months        Advanced Directives:  Patient has decided on a power of   Patient has spoken to designated power of   Patient has completed advanced directive          Preventative Screening/Counseling:      Cardiovascular:      General: Screening Current          Diabetes:      General: Screening Current          Colorectal Cancer:      General: Patient Declines          Breast Cancer:      General: Screening Current          Cervical Cancer:      General: Screening Current          Osteoporosis:      General: Screening Current          AAA:      General: Screening Not Indicated          Glaucoma:      General: Screening Current          HIV:      General: Screening Not Indicated          Hepatitis C:      General: Screening Not Indicated        Advanced Directives:   Patient has living will for healthcare, has durable POA for healthcare, patient has an advanced directive  Information on ACP and/or AD provided  No 5 wishes given  End of life assessment reviewed with patient  Provider agrees with end of life decisions   Immunizations:      Influenza: Patient Declines      Pneumococcal: Patient Declines      Shingrix: Patient Declines      Hepatitis B (Low risk patients): Series Not Indicated      Zostavax: Patient Declines      TD: Patient Declines      TDAP: Patient Declines      Other Preventative Counseling (Non-Medicare):   Fall Prevention, Increase physical activity, Nutrition Counseling and Car/seat belt/driving safety reviewed

## 2019-01-14 NOTE — PROGRESS NOTES
Assessment/Plan:     Diagnoses and all orders for this visit:    Acute bilateral low back pain without sciatica  -     Methylprednisolone 4 MG TBPK; Use as directed on package  -     cyclobenzaprine (FLEXERIL) 5 mg tablet; Take 1 tablet (5 mg total) by mouth daily at bedtime  -     Ambulatory referral to Physical Therapy; Future    I believe patient's back pain is more back spasm and muscular  Will give her prednisone pack  Gentle muscle relaxants used at night  Refer to physical therapy  Follow-up if pain is not improved in 1-2 weeks          Subjective:     Chief Complaint   Patient presents with    Back Pain     lower right side, patient is unsure when it started         Patient ID: Chidi Frausto is a 74 y o  female  HPI    The following portions of the patient's history were reviewed and updated as appropriate: allergies, current medications, past family history, past medical history, past social history, past surgical history and problem list     Review of Systems   Constitutional: Negative  HENT: Negative  Eyes: Negative  Respiratory: Negative  Cardiovascular: Negative  Gastrointestinal: Negative  Endocrine: Negative  Genitourinary: Negative  Musculoskeletal: Positive for arthralgias and back pain  Skin: Negative  Allergic/Immunologic: Negative  Neurological: Negative  Hematological: Negative  Psychiatric/Behavioral: Negative  All other systems reviewed and are negative  Objective:    Vitals:    01/14/19 1355   BP: 138/88   BP Location: Right arm   Patient Position: Sitting   Cuff Size: Standard   Pulse: 73   Resp: 18   Temp: 98 1 °F (36 7 °C)   TempSrc: Tympanic   SpO2: 96%   Weight: 70 kg (154 lb 6 4 oz)   Height: 5' (1 524 m)          Physical Exam   Constitutional: She is oriented to person, place, and time  She appears well-developed and well-nourished  HENT:   Head: Normocephalic and atraumatic     Right Ear: External ear normal    Left Ear: External ear normal    Mouth/Throat: Oropharynx is clear and moist    Eyes: Pupils are equal, round, and reactive to light  Conjunctivae and EOM are normal    Neck: Normal range of motion  Cardiovascular: Normal rate, regular rhythm and normal heart sounds  Pulmonary/Chest: Effort normal and breath sounds normal    Abdominal: Soft  Bowel sounds are normal    Musculoskeletal: Normal range of motion  Very tight and sore paraspinal musculature in her lower back bilaterally   Neurological: She is alert and oriented to person, place, and time  She has normal reflexes  Skin: Skin is warm and dry  Psychiatric: She has a normal mood and affect  Her behavior is normal  Judgment and thought content normal    Nursing note and vitals reviewed

## 2019-02-05 DIAGNOSIS — I10 HYPERTENSION, ESSENTIAL: Primary | ICD-10-CM

## 2019-02-05 RX ORDER — METOPROLOL TARTRATE 50 MG/1
50 TABLET, FILM COATED ORAL 2 TIMES DAILY
Qty: 60 TABLET | Refills: 0 | Status: SHIPPED | OUTPATIENT
Start: 2019-02-05 | End: 2019-04-08 | Stop reason: SDUPTHER

## 2019-02-11 DIAGNOSIS — F41.9 ANXIETY: ICD-10-CM

## 2019-02-11 RX ORDER — ALPRAZOLAM 0.25 MG/1
TABLET ORAL
Qty: 90 TABLET | Refills: 1 | Status: SHIPPED | OUTPATIENT
Start: 2019-02-11 | End: 2019-04-08 | Stop reason: SDUPTHER

## 2019-02-13 ENCOUNTER — OFFICE VISIT (OUTPATIENT)
Dept: FAMILY MEDICINE CLINIC | Facility: CLINIC | Age: 74
End: 2019-02-13
Payer: MEDICARE

## 2019-02-13 VITALS
WEIGHT: 154.6 LBS | DIASTOLIC BLOOD PRESSURE: 84 MMHG | HEART RATE: 71 BPM | BODY MASS INDEX: 30.35 KG/M2 | SYSTOLIC BLOOD PRESSURE: 140 MMHG | HEIGHT: 60 IN | TEMPERATURE: 98.5 F

## 2019-02-13 DIAGNOSIS — M54.50 ACUTE BILATERAL LOW BACK PAIN WITHOUT SCIATICA: ICD-10-CM

## 2019-02-13 PROCEDURE — 99213 OFFICE O/P EST LOW 20 MIN: CPT | Performed by: FAMILY MEDICINE

## 2019-02-13 RX ORDER — METHYLPREDNISOLONE 4 MG/1
TABLET ORAL
Qty: 21 TABLET | Refills: 0 | Status: SHIPPED | OUTPATIENT
Start: 2019-02-13 | End: 2019-06-03

## 2019-02-13 RX ORDER — CYCLOBENZAPRINE HCL 5 MG
5 TABLET ORAL
Qty: 10 TABLET | Refills: 0 | Status: SHIPPED | OUTPATIENT
Start: 2019-02-13 | End: 2019-05-29

## 2019-02-13 NOTE — PROGRESS NOTES
Assessment/Plan:     Diagnoses and all orders for this visit:    Acute bilateral low back pain without sciatica  -     methylPREDNISolone 4 MG tablet therapy pack; Use as directed on package  -     cyclobenzaprine (FLEXERIL) 5 mg tablet; Take 1 tablet (5 mg total) by mouth daily at bedtime  -     Ambulatory referral to Pain Management; Future      I discussed the nature of her pain  She has known disease from an MRI- 2 years ago  I recommended physical therapy but she has declined to do that  She has seen pain management past  He referral back to Pain Management if the medication does not improve the problem      Subjective:     Chief Complaint   Patient presents with    Back Pain     Lower back pain        Patient ID: Emily Arora is a 74 y o  female  Patient here for follow-up of back pain  She states the symptoms got slightly better after her last visit  But there back in today  As she is not a great historian and is unclear as to his this specifics on her pain          The following portions of the patient's history were reviewed and updated as appropriate: allergies, current medications, past family history, past medical history, past social history, past surgical history and problem list     Review of Systems   Constitutional: Negative  HENT: Negative  Eyes: Negative  Respiratory: Negative  Cardiovascular: Negative  Gastrointestinal: Negative  Endocrine: Negative  Genitourinary: Negative  Musculoskeletal: Positive for arthralgias and back pain  Skin: Negative  Allergic/Immunologic: Negative  Neurological: Negative  Hematological: Negative  Psychiatric/Behavioral: Negative  All other systems reviewed and are negative          Objective:    Vitals:    02/13/19 1053   BP: 140/84   BP Location: Right arm   Patient Position: Sitting   Cuff Size: Standard   Pulse: 71   Temp: 98 5 °F (36 9 °C)   TempSrc: Tympanic   Weight: 70 1 kg (154 lb 9 6 oz)   Height: 5' (1 524 m) Physical Exam   Constitutional: She is oriented to person, place, and time  She appears well-developed and well-nourished  HENT:   Head: Normocephalic and atraumatic  Right Ear: External ear normal    Left Ear: External ear normal    Mouth/Throat: Oropharynx is clear and moist    Eyes: Pupils are equal, round, and reactive to light  Conjunctivae and EOM are normal    Neck: Normal range of motion  Cardiovascular: Normal rate, regular rhythm and normal heart sounds  Pulmonary/Chest: Effort normal and breath sounds normal    Abdominal: Soft  Bowel sounds are normal    Musculoskeletal: Normal range of motion  Tight  paraspinal musculature bilaterally     Neurological: She is alert and oriented to person, place, and time  She has normal reflexes  Resting tremor   Skin: Skin is warm and dry  Psychiatric: She has a normal mood and affect  Her behavior is normal  Judgment and thought content normal    Nursing note and vitals reviewed

## 2019-02-15 ENCOUNTER — TELEPHONE (OUTPATIENT)
Dept: NEUROLOGY | Facility: CLINIC | Age: 74
End: 2019-02-15

## 2019-02-15 NOTE — TELEPHONE ENCOUNTER
Pt's dtr BODØ called, she is concern about pt driving  Her movement and reactions is slower  Not sure if pt is safe to continue driving  Pt has an appt on 2/18/19 @10:30 am  BODØ usually goes w/ pt on all her appts  She does not want to bring up this issue while pt is present  Asking if you would be ok to discuss pt's driving issues and order necessary test            Thanks

## 2019-02-18 ENCOUNTER — OFFICE VISIT (OUTPATIENT)
Dept: NEUROLOGY | Facility: CLINIC | Age: 74
End: 2019-02-18
Payer: MEDICARE

## 2019-02-18 VITALS
BODY MASS INDEX: 30.43 KG/M2 | WEIGHT: 155 LBS | HEIGHT: 60 IN | SYSTOLIC BLOOD PRESSURE: 124 MMHG | DIASTOLIC BLOOD PRESSURE: 84 MMHG | HEART RATE: 57 BPM

## 2019-02-18 DIAGNOSIS — K59.09 OTHER CONSTIPATION: ICD-10-CM

## 2019-02-18 DIAGNOSIS — G20 PARKINSON DISEASE (HCC): ICD-10-CM

## 2019-02-18 DIAGNOSIS — M48.062 SPINAL STENOSIS OF LUMBAR REGION WITH NEUROGENIC CLAUDICATION: Primary | ICD-10-CM

## 2019-02-18 PROCEDURE — 99214 OFFICE O/P EST MOD 30 MIN: CPT | Performed by: PSYCHIATRY & NEUROLOGY

## 2019-02-18 NOTE — LETTER
February 18, 2019     Марина Suggs DO  [address]  [address]  [facility] [address]    Patient: Emily Arora   YOB: 1945   Date of Visit: 2/18/2019       Dear Dr Santoro Raw: Thank you for referring Norris Cummings to me for evaluation  Below are my notes for this consultation  If you have questions, please do not hesitate to call me  I look forward to following your patient along with you  Sincerely,        Mary Fields MD        CC: No Recipients  Mary Fields MD  2/18/2019  1:08 PM  Sign at close encounter  Assessment/Plan:    Parkinson disease [facility])  25-year-old right-handed woman presents in follow-up for Parkinson's disease, well controlled on Azilect monotherapy  Her exam remains stable with mild right greater than left parkinsonism  We again discussed the importance of remaining physically active  She is most bothered by her low back pain and possible neurogenic claudication  I supported her family in the importance of physical therapy for lumbago  Given that the patient has not had significantly bothered by her Parkinson's symptoms will not make any changes to her medications at this point  Diagnoses and all orders for this visit:    Spinal stenosis of lumbar region with neurogenic claudication    Parkinson disease ([facility] )    Other constipation        Subjective:     Patient ID: Emily Arora is a 74 y o  female  Presents today with her  and 2 of her 3 daughters  I had the pleasure of seeing your patient, Emily Arora in the [facility] at the [address] [place] [address],[address] for Neuroscience  Norris Cummings is a 74year old right handed woman who presents in follow up for Parkinson's disease, symptom onset in 2016 with right arm tremor  On initial evaluation she had asymmetric rigidity R>L, rest tremor which emerges when walking and focal bradykinesia with decrement  She presented on Azilect monotherapy with good symptom control       When she was last here I encouraged her to increase her physical activity and consider restarting PT  Current medications and timing:   - Rasaglinine 1mg, 1 tab daily    Interval history:   Prednisone and muscle relaxer for back pain given by PCP  Somewhat helpful  No change in PD symptoms per patient  Constipation has been an issues  miralax daily  Senna daily  She has not increased her physical activity  She has not been interested in starting physical therapy, though recommended by her primary care doctor as well  Regarding motor symptoms:   Tremor: worse with stress  Slowness: yes  No change  Nothing significant  Stiffness: no   Changes in gait: no significant change  Back pain, leg pain        Falls: no    Trouble with swallowing: no     Regarding non-motor symptoms:   Lightheadedness: no   Mood: ok   Sleep: "that's great"   Driving issues: no issues  The following portions of the patient's history were reviewed and updated as appropriate: allergies, current medications, past family history, past medical history, past social history, past surgical history and problem list       Objective:    /84 (BP Location: Right arm, Patient Position: Sitting, Cuff Size: Standard)   Pulse 57   Ht 5' (1 524 m)   Wt 70 3 kg (155 lb)   BMI 30 27 kg/m²      Physical Exam    Neurological Exam  GENERAL MEDICAL EXAMINATION:  General appearance: alert, in no apparent distress  Appropriately dressed and groomed  Conversing and interacting appropriately  Eyes: Sclera are non-injected  Ears, nose, Mouth, Throat: Mucous membranes are moist    Resp: Breathing comfortably on RA   Musculoskeletal: No evidence of deformities  No contractures  No Edema  Skin: No visible rashes  Warm and well perfused    Psych: normal and appropriate affect   Neuro:                             UPDRS      Speech  1     Facial Expression  2     Rigidity - Neck  0     Rigidity - Upper Extremity (Right)  2     Rigidity - Upper Extremity (Left) 0     Rigidity - Lower Extremity (Right)  0     Rigidity - Lower Extremity (Left)   0     Finger Taps (Right)   2     Finger Taps (Left)   2     Hand Movement (Right)  2     Hand Movement (Left)   2     Pronation/Supination (Right)  2     Pronation/Supination (Left)   1     Toe Tapping (Right) 2     Toe Tapping (Left) 1     Leg Agility (Right)  1     Leg Agility (Left)   2     Arising from Chair   1     Gait   1     Freezing of Gait 0     Postural Stability        Posture 2     Global spontaneity of movement 1     Postural Tremor (Right) 0     Postural Tremor (Left) 0     Kinetic Tremor (Right)  0     Kinetic Tremor (Left)  0     Rest tremor amplitude RUE 1     Rest tremor amplitude LUE 0     Rest tremor amplitude RLE 2     Reset tremor amplitude LLE 0     Lip/Jaw Tremor  0     Consistency of tremor 1     Motor Exam Total:        Tremor emerges with mental destraction  Decreased arm swing bl R>L  Decreased step jhoan ok stride length  Review of Systems   Constitutional: Negative  Negative for appetite change and fever  HENT: Negative  Negative for hearing loss, tinnitus, trouble swallowing and voice change  Eyes: Negative  Negative for photophobia and pain  Respiratory: Negative  Negative for shortness of breath  Cardiovascular: Negative  Negative for palpitations  Gastrointestinal: Negative  Negative for nausea and vomiting  Endocrine: Negative  Negative for cold intolerance and heat intolerance  Genitourinary: Negative  Negative for dysuria, frequency and urgency  Musculoskeletal: Positive for back pain  Negative for myalgias and neck pain  Skin: Negative  Negative for rash  Neurological: Negative  Negative for dizziness, tremors, seizures, syncope, facial asymmetry, speech difficulty, weakness, light-headedness, numbness and headaches  Hematological: Negative  Does not bruise/bleed easily  Psychiatric/Behavioral: Negative    Negative for confusion, hallucinations and sleep disturbance  The above ROS was reviewed and updated  Susan Ontiveros MD  Medical Director   [facility]  Movement and Memory Specialist       Current Outpatient Medications on File Prior to Visit   Medication Sig Dispense Refill    ALPRAZolam (XANAX) 0 25 mg tablet TAKE 1 TABLET BY MOUTH THREE TIMES DAILY AS NEEDED FOR ANXIETY 90 tablet 1    cyclobenzaprine (FLEXERIL) 5 mg tablet Take 1 tablet (5 mg total) by mouth daily at bedtime 10 tablet 0    esomeprazole (NexIUM) 20 mg capsule Take 20 mg by mouth every morning before breakfast      metoprolol tartrate (LOPRESSOR) 50 mg tablet Take 1 tablet (50 mg total) by mouth 2 (two) times a day 60 tablet 0    pravastatin (PRAVACHOL) 40 mg tablet Take 40 mg by mouth daily   rasagiline (AZILECT) 1 MG Take 1 tablet by mouth daily 90 tablet 1    senna (SENOKOT) 8 6 MG tablet Take 1 tablet by mouth daily as needed       aspirin (ECOTRIN LOW STRENGTH) 81 mg EC tablet Take 81 mg by mouth daily      docusate sodium (COLACE) 100 mg capsule Take 100 mg by mouth 2 (two) times a day   ibuprofen (MOTRIN) 200 mg tablet Take by mouth      latanoprost (XALATAN) 0 005 % ophthalmic solution       methylPREDNISolone 4 MG tablet therapy pack Use as directed on package (Patient not taking: Reported on 2/18/2019) 21 tablet 0    timolol (BETIMOL) 0 5 % ophthalmic solution Administer 1 drop to both eyes daily  No current facility-administered medications on file prior to visit

## 2019-02-18 NOTE — ASSESSMENT & PLAN NOTE
40-year-old right-handed woman presents in follow-up for Parkinson's disease, well controlled on Azilect monotherapy  Her exam remains stable with mild right greater than left parkinsonism  Discussed some lifestyle and diet modifications for the patient's constipation  If not improving she could be referred to GI  We again discussed the importance of remaining physically active  She is most bothered by her low back pain and possible neurogenic claudication  I supported her family in the importance of physical therapy for lumbago  Given that the patient has not had significantly bothered by her Parkinson's symptoms will not make any changes to her medications at this point

## 2019-02-18 NOTE — PATIENT INSTRUCTIONS
For constipation:  -Get regular exercise  -Drink lots of water  -Every day have at least one serving of fruit with high fiber foods:   Here are some options: applesauce, blueberries, activia yogurt, bran (like All-Bran or Fiber-one), prunes with oatmeal or polenta  Consider adding 1-2 tablespoons of ground flax seed as well  - Remember that bananas are constipating  So are refined grains like white rice and white pasta (whole wheat pasta and brown rice ok)  -You can also try a probiotic, which you can find at natural food stores, online, or at the grocery store  Foods with probiotics include yogurt and fermented foods like pickles, sauerkraut, kimchee and miso  There is also a juice called "Good Belly" and a drink called "kefir" which contain probiotics     - There are medications that can also be helpful:   Docusate sodium (stool softener), 100 mg, 1-3 tabs at night   Miralax once per day

## 2019-02-18 NOTE — PROGRESS NOTES
Assessment/Plan:    Parkinson disease [facility])  40-year-old right-handed woman presents in follow-up for Parkinson's disease, well controlled on Azilect monotherapy  Her exam remains stable with mild right greater than left parkinsonism  Discussed some lifestyle and diet modifications for the patient's constipation  If not improving she could be referred to GI  We again discussed the importance of remaining physically active  She is most bothered by her low back pain and possible neurogenic claudication  I supported her family in the importance of physical therapy for lumbago  Given that the patient has not had significantly bothered by her Parkinson's symptoms will not make any changes to her medications at this point  Diagnoses and all orders for this visit:    Spinal stenosis of lumbar region with neurogenic claudication    Parkinson disease ([facility] [address] )    Other constipation        Subjective:     Patient ID: Xiang Santana is a 74 y o  female  Presents today with her  and 2 of her 3 daughters  I had the pleasure of seeing your patient, Xiang Santana in the Movement Disorders Clinic at the [facility]  Efren Puentes is a 74year old right handed woman who presents in follow up for Parkinson's disease, symptom onset in 2016 with right arm tremor  On initial evaluation she had asymmetric rigidity R>L, rest tremor which emerges when walking and focal bradykinesia with decrement  She presented on Azilect monotherapy with good symptom control  When she was last here I encouraged her to increase her physical activity and consider restarting PT  Current medications and timing:   - Rasaglinine 1mg, 1 tab daily    Interval history:   Prednisone and muscle relaxer for back pain given by PCP  Somewhat helpful  No change in PD symptoms per patient  Constipation has been an issues  miralax daily  Senna daily  She has not increased her physical activity    She has not been interested in starting physical therapy, though recommended by her primary care doctor as well  Regarding motor symptoms:   Tremor: worse with stress  Slowness: yes  No change  Nothing significant  Stiffness: no   Changes in gait: no significant change  Back pain, leg pain        Falls: no    Trouble with swallowing: no     Regarding non-motor symptoms:   Lightheadedness: no   Mood: ok   Sleep: "that's great"   Driving issues: no issues  The following portions of the patient's history were reviewed and updated as appropriate: allergies, current medications, past family history, past medical history, past social history, past surgical history and problem list       Objective:    /84 (BP Location: Right arm, Patient Position: Sitting, Cuff Size: Standard)   Pulse 57   Ht 5' (1 524 m)   Wt 70 3 kg (155 lb)   BMI 30 27 kg/m²     Physical Exam    Neurological Exam  GENERAL MEDICAL EXAMINATION:  General appearance: alert, in no apparent distress  Appropriately dressed and groomed  Conversing and interacting appropriately  Eyes: Sclera are non-injected  Ears, nose, Mouth, Throat: Mucous membranes are moist    Resp: Breathing comfortably on RA   Musculoskeletal: No evidence of deformities  No contractures  No Edema  Skin: No visible rashes  Warm and well perfused    Psych: normal and appropriate affect   Neuro:                            UPDRS      Speech  1     Facial Expression  2     Rigidity - Neck  0     Rigidity - Upper Extremity (Right)  2     Rigidity - Upper Extremity (Left)   0     Rigidity - Lower Extremity (Right)  0     Rigidity - Lower Extremity (Left)   0     Finger Taps (Right)   2     Finger Taps (Left)   2     Hand Movement (Right)  2     Hand Movement (Left)   2     Pronation/Supination (Right)  2     Pronation/Supination (Left)   1     Toe Tapping (Right) 2     Toe Tapping (Left) 1     Leg Agility (Right)  1     Leg Agility (Left)   2     Arising from Chair   1     Gait   1     Freezing of Gait 0     Postural Stability        Posture 2     Global spontaneity of movement 1     Postural Tremor (Right) 0     Postural Tremor (Left) 0     Kinetic Tremor (Right)  0     Kinetic Tremor (Left)  0     Rest tremor amplitude RUE 1     Rest tremor amplitude LUE 0     Rest tremor amplitude RLE 2     Reset tremor amplitude LLE 0     Lip/Jaw Tremor  0     Consistency of tremor 1     Motor Exam Total:        Tremor emerges with mental destraction  Decreased arm swing bl R>L  Decreased step jhoan ok stride length  Review of Systems   Constitutional: Negative  Negative for appetite change and fever  HENT: Negative  Negative for hearing loss, tinnitus, trouble swallowing and voice change  Eyes: Negative  Negative for photophobia and pain  Respiratory: Negative  Negative for shortness of breath  Cardiovascular: Negative  Negative for palpitations  Gastrointestinal: Negative  Negative for nausea and vomiting  Endocrine: Negative  Negative for cold intolerance and heat intolerance  Genitourinary: Negative  Negative for dysuria, frequency and urgency  Musculoskeletal: Positive for back pain  Negative for myalgias and neck pain  Skin: Negative  Negative for rash  Neurological: Negative  Negative for dizziness, tremors, seizures, syncope, facial asymmetry, speech difficulty, weakness, light-headedness, numbness and headaches  Hematological: Negative  Does not bruise/bleed easily  Psychiatric/Behavioral: Negative  Negative for confusion, hallucinations and sleep disturbance  The above ROS was reviewed and updated       Fritz Patrick MD  Medical Director   [facility]  Movement and Memory Specialist       Current Outpatient Medications on File Prior to Visit   Medication Sig Dispense Refill    ALPRAZolam (XANAX) 0 25 mg tablet TAKE 1 TABLET BY MOUTH THREE TIMES DAILY AS NEEDED FOR ANXIETY 90 tablet 1    cyclobenzaprine (FLEXERIL) 5 mg tablet Take 1 tablet (5 mg total) by mouth daily at bedtime 10 tablet 0    esomeprazole (NexIUM) 20 mg capsule Take 20 mg by mouth every morning before breakfast      metoprolol tartrate (LOPRESSOR) 50 mg tablet Take 1 tablet (50 mg total) by mouth 2 (two) times a day 60 tablet 0    pravastatin (PRAVACHOL) 40 mg tablet Take 40 mg by mouth daily   rasagiline (AZILECT) 1 MG Take 1 tablet by mouth daily 90 tablet 1    senna (SENOKOT) 8 6 MG tablet Take 1 tablet by mouth daily as needed       aspirin (ECOTRIN LOW STRENGTH) 81 mg EC tablet Take 81 mg by mouth daily      docusate sodium (COLACE) 100 mg capsule Take 100 mg by mouth 2 (two) times a day   ibuprofen (MOTRIN) 200 mg tablet Take by mouth      latanoprost (XALATAN) 0 005 % ophthalmic solution       methylPREDNISolone 4 MG tablet therapy pack Use as directed on package (Patient not taking: Reported on 2/18/2019) 21 tablet 0    timolol (BETIMOL) 0 5 % ophthalmic solution Administer 1 drop to both eyes daily  No current facility-administered medications on file prior to visit

## 2019-03-19 ENCOUNTER — OFFICE VISIT (OUTPATIENT)
Dept: FAMILY MEDICINE CLINIC | Facility: CLINIC | Age: 74
End: 2019-03-19
Payer: MEDICARE

## 2019-03-19 ENCOUNTER — APPOINTMENT (OUTPATIENT)
Dept: RADIOLOGY | Facility: CLINIC | Age: 74
End: 2019-03-19
Payer: MEDICARE

## 2019-03-19 VITALS
SYSTOLIC BLOOD PRESSURE: 132 MMHG | BODY MASS INDEX: 30.31 KG/M2 | TEMPERATURE: 97.8 F | WEIGHT: 154.4 LBS | DIASTOLIC BLOOD PRESSURE: 86 MMHG | OXYGEN SATURATION: 98 % | HEART RATE: 73 BPM | HEIGHT: 60 IN

## 2019-03-19 DIAGNOSIS — M54.50 CHRONIC RIGHT-SIDED LOW BACK PAIN WITHOUT SCIATICA: ICD-10-CM

## 2019-03-19 DIAGNOSIS — G89.29 CHRONIC RIGHT-SIDED LOW BACK PAIN WITHOUT SCIATICA: Primary | ICD-10-CM

## 2019-03-19 DIAGNOSIS — G89.29 CHRONIC RIGHT-SIDED LOW BACK PAIN WITHOUT SCIATICA: ICD-10-CM

## 2019-03-19 DIAGNOSIS — M54.50 CHRONIC RIGHT-SIDED LOW BACK PAIN WITHOUT SCIATICA: Primary | ICD-10-CM

## 2019-03-19 PROCEDURE — 72110 X-RAY EXAM L-2 SPINE 4/>VWS: CPT

## 2019-03-19 PROCEDURE — 99213 OFFICE O/P EST LOW 20 MIN: CPT | Performed by: FAMILY MEDICINE

## 2019-03-19 NOTE — PROGRESS NOTES
Assessment/Plan:     Diagnoses and all orders for this visit:    Chronic right-sided low back pain without sciatica  -     XR spine lumbar minimum 4 views non injury; Future  -     Ambulatory referral to Pain Management; Future      patient is still declining do physical therapy  Will refer to pain management  Will also get an x-ray  She can follow up as      Subjective:     Chief Complaint   Patient presents with    Back Pain     on going back pain and it has not gone away for over a month and would like more testing done        Patient ID: Erin Small is a 74 y o  female  Patient is here for continued back pain  As she states the pain is not much better  She states worse in the morning when she wakes up but then loosens up gets slightly better if she moves around  She wants further testing      The following portions of the patient's history were reviewed and updated as appropriate: allergies, current medications, past family history, past medical history, past social history, past surgical history and problem list     Review of Systems   Constitutional: Negative  HENT: Negative  Eyes: Negative  Respiratory: Negative  Cardiovascular: Negative  Gastrointestinal: Negative  Endocrine: Negative  Genitourinary: Negative  Musculoskeletal: Positive for arthralgias and back pain  Skin: Negative  Allergic/Immunologic: Negative  Neurological: Negative  Hematological: Negative  Psychiatric/Behavioral: Negative  All other systems reviewed and are negative  Objective:    Vitals:    03/19/19 0831   BP: 132/86   BP Location: Right arm   Patient Position: Sitting   Cuff Size: Standard   Pulse: 73   Temp: 97 8 °F (36 6 °C)   TempSrc: Tympanic   SpO2: 98%   Weight: 70 kg (154 lb 6 4 oz)   Height: 5' (1 524 m)          Physical Exam   Constitutional: She is oriented to person, place, and time  She appears well-developed and well-nourished     HENT:   Head: Normocephalic and atraumatic  Right Ear: External ear normal    Left Ear: External ear normal    Mouth/Throat: Oropharynx is clear and moist    Eyes: Pupils are equal, round, and reactive to light  Conjunctivae and EOM are normal    Neck: Normal range of motion  Cardiovascular: Normal rate, regular rhythm and normal heart sounds  Pulmonary/Chest: Effort normal and breath sounds normal    Abdominal: Soft  Bowel sounds are normal    Musculoskeletal: Normal range of motion  spasmed paraspinal muscular her bilaterally   Neurological: She is alert and oriented to person, place, and time  She has normal reflexes  Skin: Skin is warm and dry  Psychiatric: She has a normal mood and affect  Her behavior is normal  Judgment and thought content normal    Nursing note and vitals reviewed

## 2019-03-25 ENCOUNTER — TELEPHONE (OUTPATIENT)
Dept: FAMILY MEDICINE CLINIC | Facility: CLINIC | Age: 74
End: 2019-03-25

## 2019-04-08 DIAGNOSIS — I10 HYPERTENSION, ESSENTIAL: ICD-10-CM

## 2019-04-08 DIAGNOSIS — F41.9 ANXIETY: ICD-10-CM

## 2019-04-08 RX ORDER — ALPRAZOLAM 0.25 MG/1
0.25 TABLET ORAL 3 TIMES DAILY PRN
Qty: 90 TABLET | Refills: 1 | Status: SHIPPED | OUTPATIENT
Start: 2019-04-08 | End: 2019-08-12 | Stop reason: SDUPTHER

## 2019-04-08 RX ORDER — METOPROLOL TARTRATE 50 MG/1
50 TABLET, FILM COATED ORAL 2 TIMES DAILY
Qty: 60 TABLET | Refills: 0 | Status: SHIPPED | OUTPATIENT
Start: 2019-04-08 | End: 2019-05-20 | Stop reason: SDUPTHER

## 2019-04-09 ENCOUNTER — OFFICE VISIT (OUTPATIENT)
Dept: PAIN MEDICINE | Facility: CLINIC | Age: 74
End: 2019-04-09
Payer: MEDICARE

## 2019-04-09 VITALS
SYSTOLIC BLOOD PRESSURE: 130 MMHG | HEART RATE: 64 BPM | WEIGHT: 156 LBS | BODY MASS INDEX: 30.63 KG/M2 | HEIGHT: 60 IN | DIASTOLIC BLOOD PRESSURE: 76 MMHG

## 2019-04-09 DIAGNOSIS — M48.062 SPINAL STENOSIS OF LUMBAR REGION WITH NEUROGENIC CLAUDICATION: Primary | ICD-10-CM

## 2019-04-09 PROCEDURE — 99214 OFFICE O/P EST MOD 30 MIN: CPT | Performed by: ANESTHESIOLOGY

## 2019-04-22 ENCOUNTER — HOSPITAL ENCOUNTER (OUTPATIENT)
Dept: RADIOLOGY | Facility: CLINIC | Age: 74
Discharge: HOME | End: 2019-04-22
Admitting: ANESTHESIOLOGY
Payer: MEDICARE

## 2019-04-22 VITALS
HEART RATE: 56 BPM | SYSTOLIC BLOOD PRESSURE: 153 MMHG | RESPIRATION RATE: 18 BRPM | TEMPERATURE: 97.8 F | OXYGEN SATURATION: 98 % | DIASTOLIC BLOOD PRESSURE: 75 MMHG

## 2019-04-22 DIAGNOSIS — M48.062 SPINAL STENOSIS OF LUMBAR REGION WITH NEUROGENIC CLAUDICATION: ICD-10-CM

## 2019-04-22 PROCEDURE — 62323 NJX INTERLAMINAR LMBR/SAC: CPT | Performed by: ANESTHESIOLOGY

## 2019-04-22 RX ORDER — METHYLPREDNISOLONE ACETATE 80 MG/ML
160 INJECTION, SUSPENSION INTRA-ARTICULAR; INTRALESIONAL; INTRAMUSCULAR; PARENTERAL; SOFT TISSUE ONCE
Status: COMPLETED | OUTPATIENT
Start: 2019-04-22 | End: 2019-04-22

## 2019-04-22 RX ORDER — LIDOCAINE HYDROCHLORIDE 10 MG/ML
5 INJECTION, SOLUTION EPIDURAL; INFILTRATION; INTRACAUDAL; PERINEURAL ONCE
Status: COMPLETED | OUTPATIENT
Start: 2019-04-22 | End: 2019-04-22

## 2019-04-22 RX ADMIN — LIDOCAINE HYDROCHLORIDE 3 ML: 10 INJECTION, SOLUTION EPIDURAL; INFILTRATION; INTRACAUDAL; PERINEURAL at 09:20

## 2019-04-22 RX ADMIN — METHYLPREDNISOLONE ACETATE 160 MG: 80 INJECTION, SUSPENSION INTRA-ARTICULAR; INTRALESIONAL; INTRAMUSCULAR; SOFT TISSUE at 09:22

## 2019-04-22 RX ADMIN — IOHEXOL 1 ML: 300 INJECTION, SOLUTION INTRAVENOUS at 09:21

## 2019-04-29 ENCOUNTER — TELEPHONE (OUTPATIENT)
Dept: PAIN MEDICINE | Facility: CLINIC | Age: 74
End: 2019-04-29

## 2019-05-03 ENCOUNTER — TELEPHONE (OUTPATIENT)
Dept: PAIN MEDICINE | Facility: CLINIC | Age: 74
End: 2019-05-03

## 2019-05-06 ENCOUNTER — HOSPITAL ENCOUNTER (OUTPATIENT)
Dept: RADIOLOGY | Facility: CLINIC | Age: 74
Discharge: HOME | End: 2019-05-06
Attending: ANESTHESIOLOGY
Payer: MEDICARE

## 2019-05-06 VITALS
TEMPERATURE: 98.7 F | RESPIRATION RATE: 20 BRPM | HEART RATE: 80 BPM | SYSTOLIC BLOOD PRESSURE: 155 MMHG | OXYGEN SATURATION: 97 % | DIASTOLIC BLOOD PRESSURE: 84 MMHG

## 2019-05-06 DIAGNOSIS — M48.062 SPINAL STENOSIS OF LUMBAR REGION WITH NEUROGENIC CLAUDICATION: ICD-10-CM

## 2019-05-06 PROCEDURE — 62323 NJX INTERLAMINAR LMBR/SAC: CPT | Performed by: ANESTHESIOLOGY

## 2019-05-06 RX ORDER — LIDOCAINE HYDROCHLORIDE 10 MG/ML
5 INJECTION, SOLUTION EPIDURAL; INFILTRATION; INTRACAUDAL; PERINEURAL ONCE
Status: COMPLETED | OUTPATIENT
Start: 2019-05-06 | End: 2019-05-06

## 2019-05-06 RX ORDER — METHYLPREDNISOLONE ACETATE 80 MG/ML
80 INJECTION, SUSPENSION INTRA-ARTICULAR; INTRALESIONAL; INTRAMUSCULAR; PARENTERAL; SOFT TISSUE ONCE
Status: COMPLETED | OUTPATIENT
Start: 2019-05-06 | End: 2019-05-06

## 2019-05-06 RX ORDER — METHYLPREDNISOLONE ACETATE 80 MG/ML
160 INJECTION, SUSPENSION INTRA-ARTICULAR; INTRALESIONAL; INTRAMUSCULAR; PARENTERAL; SOFT TISSUE ONCE
Status: DISCONTINUED | OUTPATIENT
Start: 2019-05-06 | End: 2019-05-06

## 2019-05-06 RX ADMIN — LIDOCAINE HYDROCHLORIDE 3 ML: 10 INJECTION, SOLUTION EPIDURAL; INFILTRATION; INTRACAUDAL; PERINEURAL at 11:06

## 2019-05-06 RX ADMIN — IOHEXOL 1 ML: 300 INJECTION, SOLUTION INTRAVENOUS at 11:06

## 2019-05-06 RX ADMIN — METHYLPREDNISOLONE ACETATE 80 MG: 80 INJECTION, SUSPENSION INTRA-ARTICULAR; INTRALESIONAL; INTRAMUSCULAR; SOFT TISSUE at 11:06

## 2019-05-13 ENCOUNTER — TELEPHONE (OUTPATIENT)
Dept: PAIN MEDICINE | Facility: CLINIC | Age: 74
End: 2019-05-13

## 2019-05-18 DIAGNOSIS — I10 HYPERTENSION, ESSENTIAL: ICD-10-CM

## 2019-05-20 DIAGNOSIS — I10 HYPERTENSION, ESSENTIAL: ICD-10-CM

## 2019-05-20 DIAGNOSIS — G20 PARKINSON'S DISEASE (HCC): ICD-10-CM

## 2019-05-20 RX ORDER — METOPROLOL TARTRATE 50 MG/1
50 TABLET, FILM COATED ORAL 2 TIMES DAILY
Qty: 180 TABLET | Refills: 1 | Status: SHIPPED | OUTPATIENT
Start: 2019-05-20 | End: 2020-05-14 | Stop reason: SDUPTHER

## 2019-05-20 RX ORDER — RASAGILINE 1 MG/1
TABLET ORAL
Qty: 90 TABLET | Refills: 5 | Status: SHIPPED | OUTPATIENT
Start: 2019-05-20 | End: 2019-11-04 | Stop reason: ALTCHOICE

## 2019-05-20 RX ORDER — METOPROLOL TARTRATE 50 MG/1
TABLET, FILM COATED ORAL
Qty: 60 TABLET | Refills: 0 | OUTPATIENT
Start: 2019-05-20

## 2019-05-29 ENCOUNTER — OFFICE VISIT (OUTPATIENT)
Dept: PAIN MEDICINE | Facility: CLINIC | Age: 74
End: 2019-05-29
Payer: MEDICARE

## 2019-05-29 ENCOUNTER — TELEPHONE (OUTPATIENT)
Dept: PAIN MEDICINE | Facility: CLINIC | Age: 74
End: 2019-05-29

## 2019-05-29 VITALS
HEART RATE: 63 BPM | BODY MASS INDEX: 29.45 KG/M2 | HEIGHT: 60 IN | WEIGHT: 150 LBS | SYSTOLIC BLOOD PRESSURE: 136 MMHG | DIASTOLIC BLOOD PRESSURE: 80 MMHG

## 2019-05-29 DIAGNOSIS — M54.50 CHRONIC BILATERAL LOW BACK PAIN WITHOUT SCIATICA: ICD-10-CM

## 2019-05-29 DIAGNOSIS — G89.4 CHRONIC PAIN SYNDROME: Primary | ICD-10-CM

## 2019-05-29 DIAGNOSIS — M48.07 LUMBOSACRAL SPINAL STENOSIS: ICD-10-CM

## 2019-05-29 DIAGNOSIS — G89.29 CHRONIC BILATERAL LOW BACK PAIN WITHOUT SCIATICA: ICD-10-CM

## 2019-05-29 DIAGNOSIS — M47.816 LUMBAR SPONDYLOSIS: ICD-10-CM

## 2019-05-29 PROCEDURE — 99214 OFFICE O/P EST MOD 30 MIN: CPT | Performed by: NURSE PRACTITIONER

## 2019-05-29 RX ORDER — DULOXETIN HYDROCHLORIDE 20 MG/1
CAPSULE, DELAYED RELEASE ORAL
Qty: 60 CAPSULE | Refills: 1 | Status: SHIPPED | OUTPATIENT
Start: 2019-05-29 | End: 2019-06-03

## 2019-07-03 ENCOUNTER — HOSPITAL ENCOUNTER (EMERGENCY)
Facility: HOSPITAL | Age: 74
Discharge: HOME | End: 2019-07-04
Attending: EMERGENCY MEDICINE
Payer: MEDICARE

## 2019-07-03 VITALS
BODY MASS INDEX: 32.54 KG/M2 | TEMPERATURE: 96.4 F | SYSTOLIC BLOOD PRESSURE: 167 MMHG | DIASTOLIC BLOOD PRESSURE: 83 MMHG | WEIGHT: 155 LBS | HEART RATE: 97 BPM | HEIGHT: 58 IN | RESPIRATION RATE: 18 BRPM | OXYGEN SATURATION: 96 %

## 2019-07-03 DIAGNOSIS — K59.00 CONSTIPATION: Primary | ICD-10-CM

## 2019-07-03 DIAGNOSIS — K56.41 FECAL IMPACTION IN RECTUM (HCC): ICD-10-CM

## 2019-07-03 PROCEDURE — 99283 EMERGENCY DEPT VISIT LOW MDM: CPT

## 2019-07-03 PROCEDURE — 99283 EMERGENCY DEPT VISIT LOW MDM: CPT | Performed by: EMERGENCY MEDICINE

## 2019-07-03 RX ORDER — BISACODYL 10 MG
10 SUPPOSITORY, RECTAL RECTAL ONCE
Status: COMPLETED | OUTPATIENT
Start: 2019-07-04 | End: 2019-07-03

## 2019-07-03 RX ORDER — METHOCARBAMOL 500 MG/1
500 TABLET, FILM COATED ORAL ONCE
Status: DISCONTINUED | OUTPATIENT
Start: 2019-07-03 | End: 2019-07-03

## 2019-07-03 RX ORDER — LATANOPROST 50 UG/ML
1 SOLUTION/ DROPS OPHTHALMIC
COMMUNITY

## 2019-07-03 RX ADMIN — BISACODYL 10 MG: 10 SUPPOSITORY RECTAL at 23:55

## 2019-07-04 NOTE — ED PROVIDER NOTES
History  Chief Complaint   Patient presents with    Constipation     Pt states that she has a hx ofconstipation  Has not been able to move her bowels for 5 days  Pt attempting to move her bowels tonigth and is unsuccessful  This 64-year-old female with history of Parkinson's disease and frequent constipation states that she has the urge to defecate feels there is stool near the rectum but cannot past   Last normal bowel movement was approximately 3 or 4 days ago  She has not been taking a stool softener and flaxseed as normal   She took Ex-Lax tonight without relief  There has been no fever, bloody stool, dysuria, nausea or vomiting  Prior to Admission Medications   Prescriptions Last Dose Informant Patient Reported? Taking? ALPRAZolam (XANAX) 0 25 mg tablet   No No   Sig: Take 1 tablet (0 25 mg total) by mouth 3 (three) times a day as needed for anxiety   Patient taking differently: Take 0 25 mg by mouth 2 (two) times a day as needed for anxiety    esomeprazole (NexIUM) 20 mg capsule  Self Yes No   Sig: Take 20 mg by mouth every morning before breakfast   ibuprofen (MOTRIN) 200 mg tablet  Self Yes No   Sig: Take by mouth   latanoprost (XALATAN) 0 005 % ophthalmic solution   Yes Yes   Si drop daily at bedtime   metoprolol tartrate (LOPRESSOR) 50 mg tablet   No No   Sig: Take 1 tablet (50 mg total) by mouth 2 (two) times a day   pravastatin (PRAVACHOL) 40 mg tablet  Self Yes No   Sig: Take 40 mg by mouth daily  rasagiline (AZILECT) 1 MG   No No   Sig: TAKE 1 TABLET BY MOUTH ONCE DAILY   senna (SENOKOT) 8 6 MG tablet  Self Yes No   Sig: Take 1 tablet by mouth daily as needed    timolol (BETIMOL) 0 5 % ophthalmic solution  Self Yes No   Sig: Administer 1 drop to both eyes daily        Facility-Administered Medications: None       Past Medical History:   Diagnosis Date    Constipation     Hyperlipidemia     Hypertension        Past Surgical History:   Procedure Laterality Date     SECTION      CHOLECYSTECTOMY         Family History   Problem Relation Age of Onset    Tremor Neg Hx     Parkinsonism Neg Hx      I have reviewed and agree with the history as documented  Social History     Tobacco Use    Smoking status: Former Smoker    Smokeless tobacco: Never Used   Substance Use Topics    Alcohol use: Yes     Comment: Socially    Drug use: No        Review of Systems   Constitutional: Negative  HENT: Negative  Eyes: Negative  Respiratory: Negative  Cardiovascular: Negative  Gastrointestinal: Positive for constipation  Negative for abdominal pain, blood in stool, diarrhea, nausea and vomiting  Endocrine: Negative  Genitourinary: Negative  Musculoskeletal: Positive for gait problem and myalgias  Skin: Negative  Allergic/Immunologic: Negative  Neurological: Positive for tremors and speech difficulty  Hematological: Negative  Psychiatric/Behavioral: Negative  All other systems reviewed and are negative  Physical Exam  Physical Exam   Constitutional: She is oriented to person, place, and time  She appears well-developed and well-nourished  No distress  HENT:   Head: Normocephalic and atraumatic  Mouth/Throat: Oropharynx is clear and moist    Eyes: Pupils are equal, round, and reactive to light  Conjunctivae and EOM are normal    Neck: Normal range of motion  Neck supple  No JVD present  Cardiovascular: Normal rate, regular rhythm and intact distal pulses  No murmur heard  Pulmonary/Chest: Effort normal and breath sounds normal    Abdominal: Soft  Bowel sounds are normal  She exhibits no distension and no mass  There is no tenderness  There is no rebound and no guarding  No hernia  Genitourinary: Rectal exam shows guaiac positive stool  Genitourinary Comments: There is a fecal impaction  Digital disimpaction performed  Musculoskeletal: Normal range of motion  She exhibits no edema     Neurological: She is alert and oriented to person, place, and time  She has normal reflexes  No cranial nerve deficit  Coordination normal    Skin: Skin is warm and dry  No rash noted  She is not diaphoretic  Psychiatric: She has a normal mood and affect  Nursing note and vitals reviewed  Vital Signs  ED Triage Vitals [07/03/19 2234]   Temperature Pulse Respirations Blood Pressure SpO2   (!) 96 4 °F (35 8 °C) 99 18 (!) 177/93 95 %      Temp Source Heart Rate Source Patient Position - Orthostatic VS BP Location FiO2 (%)   Tympanic Monitor Lying Right arm --      Pain Score       --           Vitals:    07/03/19 2234 07/03/19 2245   BP: (!) 177/93 167/83   Pulse: 99 97   Patient Position - Orthostatic VS: Lying Lying         Visual Acuity      ED Medications  Medications   bisacodyl (DULCOLAX) rectal suppository 10 mg (10 mg Rectal Given 7/3/19 2495)       Diagnostic Studies  Results Reviewed     None                 No orders to display              Procedures  Procedures       ED Course  ED Course as of Jul 04 0022   Thu Jul 04, 2019   0021 Patient large bowel movement and now feels better  She has no pain and has no visible bleeding  Vital signs are stable she would like to go home now                                    MDM  Number of Diagnoses or Management Options  Constipation: established and worsening  Fecal impaction in [facility]): new and does not require workup     Amount and/or Complexity of Data Reviewed  Obtain history from someone other than the patient: yes        Disposition  Final diagnoses:   Constipation   Fecal impaction in [facility])     Time reflects when diagnosis was documented in both MDM as applicable and the Disposition within this note     Time User Action Codes Description Comment    7/4/2019 12:22 AM Trena Casiano Add [K59 00] Constipation     7/4/2019 12:22 AM Trena Casiano Add [K56 41] Fecal impaction in [facility])       ED Disposition     ED Disposition Condition Date/Time Comment    Discharge Stable Thu Jul 4, 2019 12:22 AM Cynthia Oseguera discharge to home/self care  Follow-up Information     Follow up With Specialties Details Why Contact Info    Phylicia Fisher DO Family Medicine Call in 2 days  [facility]  [phone]            Patient's Medications   Discharge Prescriptions    No medications on file     No discharge procedures on file      ED Provider  Electronically Signed by           Souleymane Elaine DO  07/04/19 7837

## 2019-07-09 ENCOUNTER — VBI (OUTPATIENT)
Dept: ADMINISTRATIVE | Facility: OTHER | Age: 74
End: 2019-07-09

## 2019-07-09 NOTE — TELEPHONE ENCOUNTER
Cooper Shaffer    ED Visit Information     Ed visit date: 7/3/2019  Diagnosis Description: Constipation; Fecal impaction in rectum [facility])  In Network? Yes Kody Andrade  Discharge status: Home  Discharged with meds ? No  Number of ED visits to date: 1  ED Severity:n/a     Outreach Information    Outreach successful: Yes 1  Date letter mailed:n/a  Date Finalized:7/9/2019    Care Coordination    Follow up appointment with pcp: no Declined ED f/u appt  Transportation issues ? No    [employer] type:  7 Day Outreach  Emergent necessity warranted by diagnosis:  No  [facility] PCP:  Yes  Transportation:  Friend/Family Transport  Called PCP first?:  No  Feels able to call PCP for urgent problems ?:  Yes  Understands what emergencies can be handled by PCP ?:  Yes  Ever any problems getting appointment with PCP for minor emergency/urgency problems?:  No  Practice Contacted Patient ?:  No  Pt had ED follow up with pcp/staff ?:  No    Seen for follow-up out of network ?:  No  Reason Patient went to ED instead of Urgent Care or PCP?:  Perceived Severity of Illness  Urgent care Education?:  Yes  07/09/2019 10:18 AM Phone (Danna [facility]onali Perea (Self) [phone] (M)   Call Complete  Personal communication with patient regarding recent ED visit on 7/3 for Constipation; Fecal impaction in rectum ([address] )  Patient was discharged without medication (given bisacodyl in ED)  and advised to follow up with PCP within 2 days of visit  Patient stated that she is feeling better and has been able to move bowels since  She stated that she is taking Senakot daily  She declined a follow up with PCP at his time  She does not meet OPCM criteria  No comorbid illnesses or history of multiple ED visits  and is aware of her nearest [place] [address] urgent care facility and after hours on-call service

## 2019-07-18 ENCOUNTER — TELEPHONE (OUTPATIENT)
Dept: FAMILY MEDICINE CLINIC | Facility: CLINIC | Age: 74
End: 2019-07-18

## 2019-07-18 NOTE — TELEPHONE ENCOUNTER
Please order physical therapy and fax to [facility] physical therapy  Please call them to let them know I ordered it

## 2019-07-18 NOTE — TELEPHONE ENCOUNTER
Delilah Galloway from [facility] called  Patient is interested in having therapy for her back pain  He was wondering if you would put in an order for this  He said that you have seen her in the past for this, but not recently  I told him that someone would call him back  [phone]   Thank you

## 2019-07-19 DIAGNOSIS — M54.9 BACK PAIN, UNSPECIFIED BACK LOCATION, UNSPECIFIED BACK PAIN LATERALITY, UNSPECIFIED CHRONICITY: Primary | ICD-10-CM

## 2019-08-02 ENCOUNTER — HOSPITAL ENCOUNTER (EMERGENCY)
Facility: HOSPITAL | Age: 74
Discharge: HOME | End: 2019-08-02
Attending: EMERGENCY MEDICINE
Payer: MEDICARE

## 2019-08-02 VITALS
DIASTOLIC BLOOD PRESSURE: 71 MMHG | HEART RATE: 78 BPM | OXYGEN SATURATION: 96 % | RESPIRATION RATE: 20 BRPM | WEIGHT: 155 LBS | TEMPERATURE: 97.7 F | SYSTOLIC BLOOD PRESSURE: 197 MMHG | BODY MASS INDEX: 31.25 KG/M2 | HEIGHT: 59 IN

## 2019-08-02 DIAGNOSIS — K59.00 CONSTIPATION: Primary | ICD-10-CM

## 2019-08-02 PROCEDURE — 99283 EMERGENCY DEPT VISIT LOW MDM: CPT

## 2019-08-02 PROCEDURE — 99283 EMERGENCY DEPT VISIT LOW MDM: CPT | Performed by: EMERGENCY MEDICINE

## 2019-08-02 RX ORDER — BISACODYL 10 MG
10 SUPPOSITORY, RECTAL RECTAL ONCE
Status: COMPLETED | OUTPATIENT
Start: 2019-08-02 | End: 2019-08-02

## 2019-08-02 RX ADMIN — BISACODYL 10 MG: 10 SUPPOSITORY RECTAL at 17:29

## 2019-08-02 NOTE — ED NOTES
Rectal exam conducted by Dr Cayla Stanley with this nurse present  Dr Jennifer Dunham some stool from rectum  Dr mathew put in orders for rectal suppository        Matt Lemus RN  08/02/19 4126

## 2019-08-02 NOTE — ED PROVIDER NOTES
History  Chief Complaint   Patient presents with    Constipation     Patient states she has not had a BM in 2 days, Patient states she has lower abd and rectal pressure     This is a 77-year-old female with a history of recurrent constipation seen here last month for the same complaints given a Dulcolax suppository with good relief  States that she has not had a bowel movement past 2-3 days  Has some mild left lower quadrant pain no nausea vomiting or fever abdominal exam is benign without peritoneal findings  History provided by:  Patient  Medical Problem   Location:  Constipation  Quality:  No bowel movement for 2-3 days  Severity:  Unable to specify  Onset quality:  Gradual  Duration:  3 days  Timing:  Constant  Chronicity:  Recurrent  Context:  Recurring constipation  Associated symptoms: abdominal pain (Minimal left lower quadrant)    Associated symptoms: no fever, no nausea and no vomiting        Prior to Admission Medications   Prescriptions Last Dose Informant Patient Reported? Taking? ALPRAZolam (XANAX) 0 25 mg tablet   No Yes   Sig: Take 1 tablet (0 25 mg total) by mouth 3 (three) times a day as needed for anxiety   Patient taking differently: Take 0 25 mg by mouth 2 (two) times a day as needed for anxiety    esomeprazole (NexIUM) 20 mg capsule  Self Yes Yes   Sig: Take 20 mg by mouth every morning before breakfast   ibuprofen (MOTRIN) 200 mg tablet  Self Yes Yes   Sig: Take by mouth   latanoprost (XALATAN) 0 005 % ophthalmic solution   Yes Yes   Si drop daily at bedtime   metoprolol tartrate (LOPRESSOR) 50 mg tablet   No Yes   Sig: Take 1 tablet (50 mg total) by mouth 2 (two) times a day   pravastatin (PRAVACHOL) 40 mg tablet  Self Yes Yes   Sig: Take 40 mg by mouth daily     rasagiline (AZILECT) 1 MG   No Yes   Sig: TAKE 1 TABLET BY MOUTH ONCE DAILY   senna (SENOKOT) 8 6 MG tablet  Self Yes Yes   Sig: Take 1 tablet by mouth daily as needed    timolol (BETIMOL) 0 5 % ophthalmic solution  Self Yes Yes   Sig: Administer 1 drop to both eyes daily  Facility-Administered Medications: None       Past Medical History:   Diagnosis Date    Constipation     Hyperlipidemia     Hypertension     Parkinsons ([address] )        Past Surgical History:   Procedure Laterality Date     SECTION      CHOLECYSTECTOMY         Family History   Problem Relation Age of Onset    Tremor Neg Hx     Parkinsonism Neg Hx      I have reviewed and agree with the history as documented  Social History     Tobacco Use    Smoking status: Former Smoker    Smokeless tobacco: Never Used   Substance Use Topics    Alcohol use: Yes     Comment: Socially    Drug use: No        Review of Systems   Constitutional: Negative for fever  Gastrointestinal: Positive for abdominal pain (Minimal left lower quadrant) and constipation  Negative for nausea and vomiting  All other systems reviewed and are negative  Physical Exam  Physical Exam   Constitutional: She appears well-developed  No distress  HENT:   Head: Normocephalic and atraumatic  Nose: Nose normal    Mouth/Throat: Oropharynx is clear and moist    Eyes: Pupils are equal, round, and reactive to light  EOM are normal  Right eye exhibits no discharge  Left eye exhibits no discharge  Neck: Neck supple  No JVD present  No tracheal deviation present  Cardiovascular: Normal rate, regular rhythm and intact distal pulses  Exam reveals no gallop and no friction rub  No murmur heard  Pulmonary/Chest: Effort normal and breath sounds normal  No stridor  No respiratory distress  She has no wheezes  She has no rales  Abdominal: Soft  Bowel sounds are normal  She exhibits no distension  There is tenderness (Minimal left lower quadrant)  There is no rebound and no guarding  Genitourinary:   Genitourinary Comments: Hard stool in the rectal vault removed by digital disimpaction no gross blood or masses   Musculoskeletal: Normal range of motion   She exhibits no edema, tenderness or deformity  Neurological: She is alert  No cranial nerve deficit or sensory deficit  She exhibits normal muscle tone  Skin: Skin is warm and dry  No rash noted  She is not diaphoretic  Psychiatric: She has a normal mood and affect  Nursing note and vitals reviewed  Vital Signs  ED Triage Vitals [08/02/19 1716]   Temperature Pulse Respirations Blood Pressure SpO2   97 7 °F (36 5 °C) 78 20 (!) 197/71 96 %      Temp src Heart Rate Source Patient Position - Orthostatic VS BP Location FiO2 (%)   -- -- -- -- --      Pain Score       5           Vitals:    08/02/19 1716   BP: (!) 197/71   Pulse: 78         Visual Acuity      ED Medications  Medications   bisacodyl (DULCOLAX) rectal suppository 10 mg (10 mg Rectal Given 8/2/19 1729)       Diagnostic Studies  Results Reviewed     None                 No orders to display              Procedures  Procedures       ED Course  ED Course as of Aug 02 1759   Fri Aug 02, 2019   1758 Patient feeling better ready for discharge after having a bowel movement                                  MDM    Disposition  Final diagnoses:   Constipation     Time reflects when diagnosis was documented in both MDM as applicable and the Disposition within this note     Time User Action Codes Description Comment    8/2/2019  5:29 PM Barb Speaker Add [K59 00] Constipation       ED Disposition     ED Disposition Condition Date/Time Comment    Discharge Stable Fri Aug 2, 2019  5:59 PM Guy Grant discharge to home/self care  Follow-up Information     Follow up With Specialties Details Why Contact Info    Stan Erickson DO Family Medicine In 1 week Follow-up [address] [place] [id number]  [phone]            Patient's Medications   Discharge Prescriptions    No medications on file     No discharge procedures on file      ED Provider  Electronically Signed by           Geoffrey Serra DO  08/02/19 1759

## 2019-08-02 NOTE — ED NOTES
Patient given suppository and bedside commode set up at bedside  Patient instructed to call with call bell when she has the desire to have a bowel movement  Patient verbalized understanding        Royal Puga RN  08/02/19 5548

## 2019-08-11 ENCOUNTER — TELEPHONE (OUTPATIENT)
Dept: OTHER | Facility: OTHER | Age: 74
End: 2019-08-11

## 2019-08-12 DIAGNOSIS — F41.9 ANXIETY: ICD-10-CM

## 2019-08-12 RX ORDER — ALPRAZOLAM 0.25 MG/1
0.25 TABLET ORAL 2 TIMES DAILY PRN
Qty: 14 TABLET | Refills: 0 | Status: SHIPPED | OUTPATIENT
Start: 2019-08-12 | End: 2019-08-13 | Stop reason: SDUPTHER

## 2019-08-13 DIAGNOSIS — F41.9 ANXIETY: ICD-10-CM

## 2019-08-13 RX ORDER — ALPRAZOLAM 0.25 MG/1
0.25 TABLET ORAL 2 TIMES DAILY PRN
Qty: 60 TABLET | Refills: 1 | Status: SHIPPED | OUTPATIENT
Start: 2019-08-13 | End: 2019-11-11 | Stop reason: SDUPTHER

## 2019-08-19 ENCOUNTER — OFFICE VISIT (OUTPATIENT)
Dept: NEUROLOGY | Facility: CLINIC | Age: 74
End: 2019-08-19
Payer: MEDICARE

## 2019-08-19 VITALS
DIASTOLIC BLOOD PRESSURE: 57 MMHG | BODY MASS INDEX: 30.24 KG/M2 | WEIGHT: 150 LBS | HEART RATE: 63 BPM | SYSTOLIC BLOOD PRESSURE: 125 MMHG | HEIGHT: 59 IN

## 2019-08-19 DIAGNOSIS — G20 PARKINSON DISEASE (HCC): ICD-10-CM

## 2019-08-19 DIAGNOSIS — M47.816 LUMBAR SPONDYLOSIS: Primary | ICD-10-CM

## 2019-08-19 PROCEDURE — 99214 OFFICE O/P EST MOD 30 MIN: CPT | Performed by: PSYCHIATRY & NEUROLOGY

## 2019-08-19 NOTE — PROGRESS NOTES
Patient ID: Quinton Miller is a 66 y o  female  Assessment/Plan:    Parkinson disease [facility])  28-year-old woman presents in follow-up for Parkinson's disease  She continues to do very well on Azilect monotherapy  Her exam is fairly stable with some progression bradykinesia and tremor  Overall the patient and her family feels that her symptoms are well controlled with minimal progression  We discussed different medication options for the future but agreed to continue current Azilect monotherapy  Overall she is more limited by her chronic low back pain  She will follow up with her primary and pain specialist regarding this and continue PT  Diagnoses and all orders for this visit:    Lumbar spondylosis    Parkinson disease ([facility] [address] )         Subjective:    HPI    I had the pleasure of seeing your patient, Quinton Miller in the Movement Disorders Clinic at the [facility]  Yessi Canchola is a 66year old right handed woman who presents in follow up for Parkinson's disease, symptom onset in 2016 with right arm tremor  On initial evaluation she had asymmetric rigidity R>L, rest tremor which emerges when walking and focal bradykinesia with decrement  She presented on Azilect monotherapy with good symptom control  Current medications and timing:   - Rasaglinine 1mg, 1 tab daily      Interval history:  Is doing PT for her low back which has been somewhat helpful  Some improvement  Retired in April     No change in symptoms per patient and family     Regarding motor symptoms:   Tremor: right hand  No real change  Slowness: same  Stiffness: same   Dystonia: no   Changes in gait: trouble when she first gets going, low back pain  Possible claudication        Falls: no        Freezing: no     Regarding non-motor symptoms:   Lightheadedness: no   Mood: ok   Sleep: "I can sleep good"   Memory trouble: some trouble   Names, walking into a room, dates ect   Hallucinations: no     Driving issues: no issues   drives with her and feels safe  Her daughter has general concerns regarding her driving  POA: Clora Fail documented  No POA      The following portions of the patient's history were reviewed and updated as appropriate: allergies, current medications, past family history, past medical history, past social history, past surgical history and problem list          Objective:    Blood pressure 125/57, pulse 63, height 4' 11" (1 499 m), weight 68 kg (150 lb)  Physical Exam    Neurological Exam    UPDRS motor:                              Time since last dose:       Speech  1     Facial Expression  3     Rigidity - Neck  0     Rigidity - Upper Extremity (Right)  2     Rigidity - Upper Extremity (Left)   1     Rigidity - Lower Extremity (Right)  1     Rigidity - Lower Extremity (Left)   0     Finger Taps (Right)   3     Finger Taps (Left)   2     Hand Movement (Right)  2     Hand Movement (Left)   1     Pronation/Supination (Right)  3     Pronation/Supination (Left)   3     Toe Tapping (Right) 2     Toe Tapping (Left) 1     Leg Agility (Right)  1     Leg Agility (Left)   1     Arising from Chair   1     Gait   2     Freezing of Gait 0     Postural Stability        Posture 2     Global spontaneity of movement 2     Postural Tremor (Right) 0     Postural Tremor (Left) 0     Kinetic Tremor (Right)  0     Kinetic Tremor (Left)  0     Rest tremor amplitude RUE 2     Rest tremor amplitude LUE 0     Rest tremor amplitude RLE 2     Reset tremor amplitude LLE 0     Lip/Jaw Tremor  0     Consistency of tremor 2-3     Motor Exam Total:            ROS:  Review of Systems   Constitutional: Negative  Negative for appetite change and fever  HENT: Negative  Negative for hearing loss, tinnitus, trouble swallowing and voice change  Eyes: Negative  Negative for photophobia and pain  Respiratory: Negative  Negative for shortness of breath  Cardiovascular: Negative  Negative for palpitations  Gastrointestinal: Negative  Negative for nausea and vomiting  Endocrine: Negative  Negative for cold intolerance and heat intolerance  Genitourinary: Negative  Negative for dysuria, frequency and urgency  Musculoskeletal: Positive for back pain  Negative for myalgias and neck pain  Pain while walking   Skin: Negative  Negative for rash  Neurological: Positive for tremors  Negative for dizziness, seizures, syncope, facial asymmetry, speech difficulty, weakness, light-headedness, numbness and headaches  Hematological: Negative  Does not bruise/bleed easily  Psychiatric/Behavioral: Positive for confusion (memory problems) and sleep disturbance  Negative for hallucinations  The above ROS was reviewed and updated       Shay Cardoso MD  Medical Director   [facility]  Movement and Memory Specialist

## 2019-08-19 NOTE — ASSESSMENT & PLAN NOTE
12-year-old woman presents in follow-up for Parkinson's disease  She continues to do very well on Azilect monotherapy  Her exam is fairly stable with some progression bradykinesia and tremor  Overall the patient and her family feels that her symptoms are well controlled with minimal progression  We discussed different medication options for the future but agreed to continue current Azilect monotherapy  Overall she is more limited by her chronic low back pain   She will follow up with her primary and pain specialist regarding this and continue PT

## 2019-08-21 DIAGNOSIS — F41.9 ANXIETY: ICD-10-CM

## 2019-08-29 ENCOUNTER — OFFICE VISIT (OUTPATIENT)
Dept: FAMILY MEDICINE CLINIC | Facility: CLINIC | Age: 74
End: 2019-08-29
Payer: MEDICARE

## 2019-08-29 VITALS
BODY MASS INDEX: 30.12 KG/M2 | WEIGHT: 149.4 LBS | HEIGHT: 59 IN | OXYGEN SATURATION: 97 % | DIASTOLIC BLOOD PRESSURE: 80 MMHG | TEMPERATURE: 98.2 F | HEART RATE: 68 BPM | SYSTOLIC BLOOD PRESSURE: 136 MMHG

## 2019-08-29 DIAGNOSIS — R30.0 BURNING WITH URINATION: ICD-10-CM

## 2019-08-29 DIAGNOSIS — R31.9 URINARY TRACT INFECTION WITH HEMATURIA, SITE UNSPECIFIED: Primary | ICD-10-CM

## 2019-08-29 DIAGNOSIS — N39.0 URINARY TRACT INFECTION WITH HEMATURIA, SITE UNSPECIFIED: Primary | ICD-10-CM

## 2019-08-29 LAB
SL AMB  POCT GLUCOSE, UA: ABNORMAL
SL AMB LEUKOCYTE ESTERASE,UA: ABNORMAL
SL AMB POCT BILIRUBIN,UA: ABNORMAL
SL AMB POCT BLOOD,UA: ABNORMAL
SL AMB POCT CLARITY,UA: CLEAR
SL AMB POCT COLOR,UA: YELLOW
SL AMB POCT KETONES,UA: ABNORMAL
SL AMB POCT NITRITE,UA: ABNORMAL
SL AMB POCT PH,UA: 5
SL AMB POCT SPECIFIC GRAVITY,UA: 1.01
SL AMB POCT URINE PROTEIN: ABNORMAL
SL AMB POCT UROBILINOGEN: NORMAL

## 2019-08-29 PROCEDURE — 99213 OFFICE O/P EST LOW 20 MIN: CPT | Performed by: FAMILY MEDICINE

## 2019-08-29 PROCEDURE — 81002 URINALYSIS NONAUTO W/O SCOPE: CPT | Performed by: FAMILY MEDICINE

## 2019-08-29 RX ORDER — SULFAMETHOXAZOLE AND TRIMETHOPRIM 800; 160 MG/1; MG/1
1 TABLET ORAL EVERY 12 HOURS SCHEDULED
Qty: 14 TABLET | Refills: 0 | Status: SHIPPED | OUTPATIENT
Start: 2019-08-29 | End: 2019-09-05

## 2019-08-29 NOTE — PROGRESS NOTES
Assessment/Plan:       Diagnoses and all orders for this visit:    Urinary tract infection with hematuria, site unspecified  -     sulfamethoxazole-trimethoprim (BACTRIM DS) 800-160 mg per tablet; Take 1 tablet by mouth every 12 (twelve) hours for 7 days    Burning with urination  -     POCT urine dip  -     Cancel: Urine culture; Future  -     Cancel: Urine culture  -     Cancel: Urine culture  -     Urine culture      patient complaining today of intermittent urinary frequency burning and dysuria  We did a urine in the office and it showed possible for infection  Will treat with Dr Sandie Delvalle send the urine for culture  She can follow up as needed or in the in the January for Medicare wellness visit      Subjective:     Chief Complaint   Patient presents with    Follow-up     ER f/u         Patient ID: Herb Eduardo is a 66 y o  female  Patient is here for urinary frequency and dysuria  She states symptoms well for month  She was recently in the hospital for constipation that has resolved and her bowels are currently stable  She has no other complaints today      The following portions of the patient's history were reviewed and updated as appropriate: allergies, current medications, past family history, past medical history, past social history, past surgical history and problem list     Review of Systems   Constitutional: Negative  HENT: Negative  Eyes: Negative  Respiratory: Negative  Cardiovascular: Negative  Gastrointestinal: Negative  Endocrine: Negative  Genitourinary: Positive for dysuria  Musculoskeletal: Negative  Skin: Negative  Allergic/Immunologic: Negative  Neurological: Negative  Hematological: Negative  Psychiatric/Behavioral: Negative  All other systems reviewed and are negative          Objective:    Vitals:    08/29/19 1320   BP: 136/80   BP Location: Right arm   Patient Position: Sitting   Cuff Size: Large   Pulse: 68   Temp: 98 2 °F (36 8 °C)   TempSrc: Tympanic   SpO2: 97%   Weight: 67 8 kg (149 lb 6 4 oz)   Height: 4' 11" (1 499 m)          Physical Exam   Constitutional: She is oriented to person, place, and time  She appears well-developed and well-nourished  HENT:   Head: Normocephalic and atraumatic  Right Ear: External ear normal    Left Ear: External ear normal    Mouth/Throat: Oropharynx is clear and moist    Eyes: Pupils are equal, round, and reactive to light  Conjunctivae and EOM are normal    Neck: Normal range of motion  Cardiovascular: Normal rate, regular rhythm and normal heart sounds  Pulmonary/Chest: Effort normal and breath sounds normal    Abdominal: Soft  Bowel sounds are normal    Musculoskeletal: Normal range of motion  Neurological: She is alert and oriented to person, place, and time  She has normal reflexes  Bradykinesia and tremor due to Parkinson's     Skin: Skin is warm and dry  Psychiatric: She has a normal mood and affect  Her behavior is normal  Judgment and thought content normal    Flat affect due to Parkinson's   Nursing note and vitals reviewed

## 2019-10-02 DIAGNOSIS — F41.9 ANXIETY: ICD-10-CM

## 2019-11-04 ENCOUNTER — OFFICE VISIT (OUTPATIENT)
Dept: FAMILY MEDICINE CLINIC | Facility: CLINIC | Age: 74
End: 2019-11-04
Payer: MEDICARE

## 2019-11-04 VITALS
HEART RATE: 66 BPM | DIASTOLIC BLOOD PRESSURE: 88 MMHG | WEIGHT: 151 LBS | OXYGEN SATURATION: 96 % | HEIGHT: 59 IN | BODY MASS INDEX: 30.44 KG/M2 | TEMPERATURE: 97.4 F | SYSTOLIC BLOOD PRESSURE: 140 MMHG

## 2019-11-04 DIAGNOSIS — M48.07 LUMBOSACRAL SPINAL STENOSIS: Primary | ICD-10-CM

## 2019-11-04 DIAGNOSIS — G89.29 CHRONIC BILATERAL LOW BACK PAIN WITHOUT SCIATICA: ICD-10-CM

## 2019-11-04 DIAGNOSIS — M54.50 CHRONIC BILATERAL LOW BACK PAIN WITHOUT SCIATICA: ICD-10-CM

## 2019-11-04 DIAGNOSIS — Z23 NEED FOR IMMUNIZATION AGAINST INFLUENZA: ICD-10-CM

## 2019-11-04 PROCEDURE — G0008 ADMIN INFLUENZA VIRUS VAC: HCPCS | Performed by: FAMILY MEDICINE

## 2019-11-04 PROCEDURE — 90662 IIV NO PRSV INCREASED AG IM: CPT | Performed by: FAMILY MEDICINE

## 2019-11-04 PROCEDURE — 99213 OFFICE O/P EST LOW 20 MIN: CPT | Performed by: FAMILY MEDICINE

## 2019-11-04 NOTE — PROGRESS NOTES
Eddie Lovett 1945 female MRN: [id number]    Family Medicine Acute Visit    ASSESSMENT/PLAN  Problem List Items Addressed This Visit        Other    Low back pain    Relevant Orders    Ambulatory referral to Pain Management    Lumbosacral spinal stenosis - Primary    Need for immunization against influenza    Relevant Orders    influenza vaccine, [id number], high-dose, PF 0 5 mL (FLUZONE HIGH-DOSE) (Completed)          Patient already est with pain management and will see them for follow up       Future Appointments   Date Time Provider Misty Michelle   1/30/2020  1:00 PM DO BERKLEY Adamson  [facility]   2/17/2020 10:30 AM Zeina Martinez MD [facility]          SUBJECTIVE  CC: Back Pain (Lower back pain radiating into legs )      HPI:  Eddie Lovett is a 66 y o  female who presents for low back pain  States she always has low back pain  She had more problems getting out of bed this morning  Takes Aleve or ibuprofen, heating pad-   She has seen Dr Arpit Lopez    Review of Systems   Constitutional: Negative for chills, fatigue and fever  HENT: Negative for congestion, postnasal drip, rhinorrhea and sinus pressure  Eyes: Negative for photophobia and visual disturbance  Respiratory: Negative for cough and shortness of breath  Cardiovascular: Negative for chest pain, palpitations and leg swelling  Gastrointestinal: Negative for abdominal pain, constipation, diarrhea, nausea and vomiting  Genitourinary: Negative for difficulty urinating and dysuria  Musculoskeletal: Positive for back pain  Negative for arthralgias and myalgias  Skin: Negative for color change and rash  Neurological: Negative for dizziness, weakness, light-headedness and headaches         Historical Information   The patient history was reviewed as follows:  Past Medical History:   Diagnosis Date    Constipation     Hyperlipidemia     Hypertension          Past Surgical History:   Procedure Laterality Date     SECTION      CHOLECYSTECTOMY       Family History   Problem Relation Age of Onset    Tremor Neg Hx     Parkinsonism Neg Hx       Social History   Social History     Substance and Sexual Activity   Alcohol Use Yes    Comment: Socially     Social History     Substance and Sexual Activity   Drug Use No     Social History     Tobacco Use   Smoking Status Former Smoker   Smokeless Tobacco Never Used       Medications:     Current Outpatient Medications:     ALPRAZolam (XANAX) 0 25 mg tablet, Take 1 tablet (0 25 mg total) by mouth 2 (two) times a day as needed for anxiety for up to 30 days, Disp: 60 tablet, Rfl: 1    esomeprazole (NexIUM) 20 mg capsule, Take 20 mg by mouth every morning before breakfast, Disp: , Rfl:     latanoprost (XALATAN) 0 005 % ophthalmic solution, 1 drop daily at bedtime, Disp: , Rfl:     metoprolol tartrate (LOPRESSOR) 50 mg tablet, Take 1 tablet (50 mg total) by mouth 2 (two) times a day, Disp: 180 tablet, Rfl: 1    pravastatin (PRAVACHOL) 40 mg tablet, Take 40 mg by mouth daily  , Disp: , Rfl:     timolol (BETIMOL) 0 5 % ophthalmic solution, Administer 1 drop to both eyes daily  , Disp: , Rfl:     No Known Allergies    OBJECTIVE  Vitals:   Vitals:    19 1141   BP: 140/88   BP Location: Right arm   Patient Position: Sitting   Cuff Size: Standard   Pulse: 66   Temp: (!) 97 4 °F (36 3 °C)   TempSrc: Tympanic   SpO2: 96%   Weight: 68 5 kg (151 lb)   Height: 4' 11" (1 499 m)         Physical Exam   Constitutional: She is oriented to person, place, and time  She appears well-developed and well-nourished  HENT:   Head: Normocephalic and atraumatic  Mouth/Throat: Oropharynx is clear and moist    Eyes: Pupils are equal, round, and reactive to light  Neck: Normal range of motion  Neck supple  Cardiovascular: Normal rate, regular rhythm and normal heart sounds  Pulmonary/Chest: Effort normal and breath sounds normal  No respiratory distress  She has no wheezes     Abdominal: Soft  Bowel sounds are normal  She exhibits no distension  There is no tenderness  Musculoskeletal: Normal range of motion  She exhibits no edema  Lumbar back: She exhibits tenderness and spasm  Neurological: She is alert and oriented to person, place, and time  Skin: Skin is warm and dry  Psychiatric: She has a normal mood and affect   Her behavior is normal                   Abhi, DO    11/4/2019

## 2019-11-05 ENCOUNTER — OFFICE VISIT (OUTPATIENT)
Dept: PAIN MEDICINE | Facility: CLINIC | Age: 74
End: 2019-11-05
Payer: MEDICARE

## 2019-11-05 VITALS
HEART RATE: 66 BPM | WEIGHT: 150 LBS | BODY MASS INDEX: 30.24 KG/M2 | HEIGHT: 59 IN | SYSTOLIC BLOOD PRESSURE: 124 MMHG | DIASTOLIC BLOOD PRESSURE: 76 MMHG

## 2019-11-05 DIAGNOSIS — M54.50 CHRONIC BILATERAL LOW BACK PAIN WITHOUT SCIATICA: ICD-10-CM

## 2019-11-05 DIAGNOSIS — G89.29 CHRONIC BILATERAL LOW BACK PAIN WITHOUT SCIATICA: ICD-10-CM

## 2019-11-05 DIAGNOSIS — M48.07 LUMBOSACRAL SPINAL STENOSIS: ICD-10-CM

## 2019-11-05 DIAGNOSIS — R26.2 AMBULATORY DYSFUNCTION: ICD-10-CM

## 2019-11-05 DIAGNOSIS — G89.4 CHRONIC PAIN SYNDROME: Primary | ICD-10-CM

## 2019-11-05 DIAGNOSIS — M47.816 LUMBAR SPONDYLOSIS: ICD-10-CM

## 2019-11-05 PROCEDURE — 99214 OFFICE O/P EST MOD 30 MIN: CPT | Performed by: NURSE PRACTITIONER

## 2019-11-05 RX ORDER — RASAGILINE 1 MG/1
0.5 TABLET ORAL DAILY
COMMUNITY
End: 2020-08-11 | Stop reason: SDUPTHER

## 2019-11-05 RX ORDER — NORTRIPTYLINE HYDROCHLORIDE 10 MG/1
CAPSULE ORAL
Qty: 60 CAPSULE | Refills: 1 | Status: SHIPPED | OUTPATIENT
Start: 2019-11-05 | End: 2020-02-13 | Stop reason: SDUPTHER

## 2019-11-05 NOTE — PATIENT INSTRUCTIONS
Nortriptyline (By mouth)   Nortriptyline (nor-TRIP-ti-ervin)  Treats depression  This medicine is a TCA  Brand Name(s): Pamelor   There may be other brand names for this medicine  When This Medicine Should Not Be Used: This medicine is not right for everyone  Do not use it if you had an allergic reaction to nortriptyline or similar medicines, or you had a recent heart attack  How to Use This Medicine:   Capsule, Liquid  · Take your medicine as directed  Your dose may need to be changed several times to find what works best for you  · Measure the oral liquid medicine with a marked measuring spoon, oral syringe, or medicine cup  · This medicine should come with a Medication Guide  Ask your pharmacist for a copy if you do not have one  · Missed dose: Take a dose as soon as you remember  If it is almost time for your next dose, wait until then and take a regular dose  Do not take extra medicine to make up for a missed dose  · Store the medicine in a closed container at room temperature, away from heat, moisture, and direct light  Drugs and Foods to Avoid:   Ask your doctor or pharmacist before using any other medicine, including over-the-counter medicines, vitamins, and herbal products  · Do not use this medicine and an MAO inhibitor (MAOI) within 14 days of each other  · Tell your doctor if you are using the following:   ¨ Buspirone, chlorpropamide, cimetidine, fentanyl, guanethidine, lithium, reserpine, Umair's wort, tramadol, tryptophan  ¨ Thyroid medicine, a phenothiazine medicine (such as chlorpromazine, perphenazine, promethazine, prochlorperazine, thioridazine), medicine for heart rhythm problems (propafenone, flecainide), triptan medicine to treat migraine headaches  · Alcohol, narcotic pain relievers, or sleeping pills may cause you to feel more lightheaded, dizzy, or faint when used with this medicine  Tell your doctor if you drink alcohol or use pain relievers or sleeping pills    Warnings While Using This Medicine:   · Tell your doctor if you are pregnant or breastfeeding, or if you have heart disease, heart rhythm problems, glaucoma, depression, an overactive thyroid, trouble urinating, or a history of seizures  · For some children, teenagers, and young adults, this medicine may increase mental or emotional problems  This may lead to thoughts of suicide and violence  Talk with your doctor right away if you have any thoughts or behavior changes that concern you  Tell your doctor if you or anyone in your family has a history of bipolar disorder or suicide attempts  · This medicine may cause serotonin syndrome, especially if you take it with certain other medicines  · This medicine may make you dizzy or drowsy  Do not drive or do anything that could be dangerous until you know how this medicine affects you  · Do not stop using this medicine suddenly  Your doctor will need to slowly decrease your dose before you stop it completely  · Tell any doctor or dentist who treats you that you are using this medicine  You may need to stop using this medicine several days before you have surgery or medical tests  · Keep all medicine out of the reach of children  Never share your medicine with anyone    Possible Side Effects While Using This Medicine:   Call your doctor right away if you notice any of these side effects:  · Allergic reaction: Itching or hives, swelling in your face or hands, swelling or tingling in your mouth or throat, chest tightness, trouble breathing  · Anxiety, restlessness, fever, sweating, muscle spasms, twitching, nausea, vomiting, diarrhea, seeing or hearing things that are not there  · Change in how much or how often you urinate, problems urinating  · Chest pain or fast, pounding, or uneven heartbeat  · Eye pain, vision changes, seeing halos around lights  · Seizures or tremors  · Thoughts of hurting yourself or others, unusual behavior  If you notice other side effects that you think are caused by this medicine, tell your doctor  Call your doctor for medical advice about side effects  You may report side effects to FDA at 3-079-FDA-5016  © 2017 [employer] Information is for End User's use only and may not be sold, redistributed or otherwise used for commercial purposes  The above information is an  only  It is not intended as medical advice for individual conditions or treatments  Talk to your doctor, nurse or pharmacist before following any medical regimen to see if it is safe and effective for you

## 2019-11-05 NOTE — PROGRESS NOTES
Assessment:  1  Chronic pain syndrome    2  Chronic bilateral low back pain without sciatica    3  Lumbosacral spinal stenosis    4  Lumbar spondylosis    5  Ambulatory dysfunction        Plan:  While the patient and her  were in the office today, I did have a thorough conversation with him regarding her chronic pain syndrome, symptoms, and treatment plan options  I did explain to the patient and her  that at this point we could consider an epidural steroid injection as she seemed even more leery of the medial branch blocks and possible radiofrequency ablation procedure when we discussed at her office visit  However, because the patient was quite hesitant we decided for now to hold off on any other injections and see if we could look at other medication options as she has not tried yet and if she would failed the medication option we could always consider the injection in the near future  The patient was agreeable and verbalized understanding  I explained to the patient and her  that overall I feel there is definitely significant osteoarthritic, neuropathic, and myofascial component to the pain symptoms and since she is still somewhat hesitant about injections and has previously tried and failed gabapentin and Cymbalta, we could try different neuropathic medications such as nortriptyline and put her on a very low and subtherapeutic dose  The patient denied being prescribed any anti-depressant and/or psychiatric medications  I reviewed with the patient that it may take 3-4 weeks for the medication's effects to be noticed and that it should never be abruptly stopped  Possible side effects include but are not limited to; vertigo, lethargy, nausea, worsening depression/anxiety, and confustion  I advised the patient to call our office if they experience any side effects     At this point because it does seem that the patient is somewhat sensitive to these kind of medications we will start her on a very low dose of 10 mg and slowly work up to 20 mg over the next 6 weeks  I advised the patient and her  that I am not expecting significant relief or improvement and as long as she is not having side effects, she is to continue the nortriptyline until her next office visit  However, as always, if she has side effects or issues with the nortriptyline, she is to call our office  The patient verbalized an understanding  The patient will follow-up in 6 weeks for medication prescription refill and reevaluation  The patient was advised to contact the office should their symptoms worsen in the interim  The patient was agreeable and verbalized an understanding  History of Present Illness: The patient is a 66 y o  female last seen on 5/29/19 who presents for a follow up office visit in regards to chronic pain syndrome secondary to lumbar spondylosis and stenosis with yovani toward gait dysfunction  The patient currently reports that since her last office visit her low back pain seems to have worsened and since her last office visit as she was last seen in May of this year she did try the Cymbalta and after 2 doses had to stop the Cymbalta because of the side effects  The patient presents today to discuss her medication regimen treatment plan as the morning is the worst with regards to her pain symptoms and that it does somewhat ease up as the morning goes on, however, by the late afternoon and evening a gets worse again  The patient reports she has continued to try over-the-counter medications with very minimal relief as well as topical rubs, creams, and patches again with very minimal relief    The patient her  present today to discuss her treatment plan options as we had previously discussed a lumbar epidural steroid injection verses lumbar medial branch blocks, however, at that time the patient was not sure she wanted to proceed with injections and is still unsure as she seems quite hesitant about an injection and want to know if there is any other medication options she could try 1st     I have personally reviewed and/or updated the patient's past medical history, past surgical history, family history, social history, current medications, allergies, and vital signs today  Review of Systems:    Review of Systems   Respiratory: Negative for shortness of breath  Cardiovascular: Negative for chest pain  Gastrointestinal: Negative for constipation, diarrhea, nausea and vomiting  Musculoskeletal: Positive for gait problem  Negative for arthralgias, joint swelling (joint stiffness) and myalgias  Skin: Negative for rash  Neurological: Negative for dizziness, seizures and weakness  All other systems reviewed and are negative  Past Medical History:   Diagnosis Date    Constipation     Hyperlipidemia     Hypertension        Past Surgical History:   Procedure Laterality Date     SECTION      CHOLECYSTECTOMY         Family History   Problem Relation Age of Onset    Tremor Neg Hx     Parkinsonism Neg Hx        Social History     Occupational History    Not on file   Tobacco Use    Smoking status: Former Smoker    Smokeless tobacco: Never Used   Substance and Sexual Activity    Alcohol use: Yes     Comment: Socially    Drug use: No    Sexual activity: Not Currently         Current Outpatient Medications:     ALPRAZolam (XANAX) 0 25 mg tablet, Take 1 tablet (0 25 mg total) by mouth 2 (two) times a day as needed for anxiety for up to 30 days, Disp: 60 tablet, Rfl: 1    esomeprazole (NexIUM) 20 mg capsule, Take 20 mg by mouth every morning before breakfast, Disp: , Rfl:     latanoprost (XALATAN) 0 005 % ophthalmic solution, 1 drop daily at bedtime, Disp: , Rfl:     metoprolol tartrate (LOPRESSOR) 50 mg tablet, Take 1 tablet (50 mg total) by mouth 2 (two) times a day, Disp: 180 tablet, Rfl: 1    pravastatin (PRAVACHOL) 40 mg tablet, Take 40 mg by mouth daily  , Disp: , Rfl:     rasagiline (AZILECT) 1 MG, Take 0 5 mg by mouth daily, Disp: , Rfl:     timolol (BETIMOL) 0 5 % ophthalmic solution, Administer 1 drop to both eyes daily  , Disp: , Rfl:     nortriptyline (PAMELOR) 10 mg capsule, Take 1 PO HS every other night x 1 week, then 1 PO HS x 2 weeks, then 2 PO HS , Disp: 60 capsule, Rfl: 1    No Known Allergies    Physical Exam:    /76 (BP Location: Left arm, Patient Position: Sitting, Cuff Size: Standard)   Pulse 66   Ht 4' 11" (1 499 m)   Wt 68 kg (150 lb)   LMP  (LMP Unknown)   BMI 30 30 kg/m²     Constitutional:normal, well developed, well nourished, alert, in no distress and non-toxic and no overt pain behavior  Eyes:anicteric  HEENT:grossly intact  Neck:supple, symmetric, trachea midline and no masses   Pulmonary:even and unlabored  Cardiovascular:No edema or pitting edema present  Skin:Normal without rashes or lesions and well hydrated  Psychiatric:Mood and affect appropriate  Neurologic:Cranial Nerves II-XII grossly intact  Musculoskeletal:The patient's gait is slightly antalgic, but steady with the assistance of 1  Imaging  No orders to display         No orders of the defined types were placed in this encounter

## 2019-11-11 DIAGNOSIS — F41.9 ANXIETY: ICD-10-CM

## 2019-11-11 RX ORDER — ALPRAZOLAM 0.25 MG/1
0.25 TABLET ORAL 2 TIMES DAILY PRN
Qty: 60 TABLET | Refills: 1 | Status: SHIPPED | OUTPATIENT
Start: 2019-11-11 | End: 2020-02-06 | Stop reason: SDUPTHER

## 2020-02-06 DIAGNOSIS — F41.9 ANXIETY: ICD-10-CM

## 2020-02-06 RX ORDER — ALPRAZOLAM 0.25 MG/1
0.25 TABLET ORAL 2 TIMES DAILY PRN
Qty: 60 TABLET | Refills: 1 | Status: SHIPPED | OUTPATIENT
Start: 2020-02-06 | End: 2020-05-18 | Stop reason: SDUPTHER

## 2020-02-13 ENCOUNTER — OFFICE VISIT (OUTPATIENT)
Dept: PAIN MEDICINE | Facility: CLINIC | Age: 75
End: 2020-02-13
Payer: MEDICARE

## 2020-02-13 VITALS
WEIGHT: 150 LBS | SYSTOLIC BLOOD PRESSURE: 126 MMHG | HEIGHT: 59 IN | HEART RATE: 65 BPM | DIASTOLIC BLOOD PRESSURE: 72 MMHG | BODY MASS INDEX: 30.24 KG/M2

## 2020-02-13 DIAGNOSIS — M48.07 LUMBOSACRAL SPINAL STENOSIS: ICD-10-CM

## 2020-02-13 DIAGNOSIS — M54.50 CHRONIC BILATERAL LOW BACK PAIN WITHOUT SCIATICA: ICD-10-CM

## 2020-02-13 DIAGNOSIS — M47.816 LUMBAR SPONDYLOSIS: ICD-10-CM

## 2020-02-13 DIAGNOSIS — G89.4 CHRONIC PAIN SYNDROME: Primary | ICD-10-CM

## 2020-02-13 DIAGNOSIS — G89.29 CHRONIC BILATERAL LOW BACK PAIN WITHOUT SCIATICA: ICD-10-CM

## 2020-02-13 PROCEDURE — 3074F SYST BP LT 130 MM HG: CPT | Performed by: NURSE PRACTITIONER

## 2020-02-13 PROCEDURE — 1160F RVW MEDS BY RX/DR IN RCRD: CPT | Performed by: NURSE PRACTITIONER

## 2020-02-13 PROCEDURE — 3008F BODY MASS INDEX DOCD: CPT | Performed by: NURSE PRACTITIONER

## 2020-02-13 PROCEDURE — 3078F DIAST BP <80 MM HG: CPT | Performed by: NURSE PRACTITIONER

## 2020-02-13 PROCEDURE — 1036F TOBACCO NON-USER: CPT | Performed by: NURSE PRACTITIONER

## 2020-02-13 PROCEDURE — 99214 OFFICE O/P EST MOD 30 MIN: CPT | Performed by: NURSE PRACTITIONER

## 2020-02-13 RX ORDER — NORTRIPTYLINE HYDROCHLORIDE 50 MG/1
CAPSULE ORAL
Qty: 30 CAPSULE | Refills: 1 | Status: SHIPPED | OUTPATIENT
Start: 2020-02-13 | End: 2020-04-09

## 2020-02-13 NOTE — PROGRESS NOTES
Assessment:  1  Chronic pain syndrome    2  Chronic bilateral low back pain without sciatica    3  Lumbosacral spinal stenosis    4  Lumbar spondylosis        Plan:  While the patient was in the office today, I did have a thorough conversation with the patient regarding their chronic pain syndrome, symptoms, medication regimen, and treatment plan  At this point time, I did discuss with the patient that if she want to we could again discussed the possibility of the lumbar medial branch blocks to see if she would be a radiofrequency ablation candidate, however, she still seemed quite hesitant and for now we have decided to hold off on the medial branch blocks and we will re-evaluate her pain and whether not she would like to discuss it at her next office visit  With regards to her medication regimen, I did explain to the patient at this point she is on a low and subtherapeutic dose of nortriptyline as we had discussed because of her concern with the side effects with other medicines like it but at this point because she is not noting any side effects but is also not noting relief to her satisfaction, I am going to have her increase the nortriptyline to 30 mg a day until she runs out of the 10 mg pills and then I sent a prescription for a 50 mg pill of the nortriptyline that she has to take 1 pill every night until her next office visit in 8 weeks  I advised the patient that if they experience any side effects or issues with the changes in their medication regiment, they should give our office a call to discuss  I also advised the patient not to drive or operate machinery until they see how the changes in the medication regimen affects them  The patient was agreeable and verbalized an understanding  The patient will follow-up in 8 weeks for medication prescription refill and reevaluation  The patient was advised to contact the office should their symptoms worsen in the interim   The patient was agreeable and verbalized an understanding  History of Present Illness: The patient is a 66 y o  female last seen on 11/5/19 who presents for a follow up office visit in regards to chronic pain syndrome secondary to lumbar spondylosis and stenosis  The patient currently reports that since her last office visit her pain symptoms have actually continued to worsen despite starting the nortriptyline  She does report that initially she felt the nortriptyline was a little bit helpful, however, she has been on the 20 mg dosage for several weeks and feels that the pain is starting to worsen  She denies any side effects from the nortriptyline and at this point the patient and her  have also been continuing to think about the medial branch blocks as we had previously discussed at her last office visit, however, the patient is still very hesitant because of her fear of needles  Patient presents today to discuss her medication regimen and treatment plan  Current pain medications includes:  Nortriptyline 20 mg at bedtime  The patient reports that this regimen is providing 25% pain relief  The patient is reporting no side effects from this pain medication regimen  I have personally reviewed and/or updated the patient's past medical history, past surgical history, family history, social history, current medications, allergies, and vital signs today  Review of Systems:    Review of Systems   Respiratory: Negative for shortness of breath  Cardiovascular: Negative for chest pain  Gastrointestinal: Negative for constipation, diarrhea, nausea and vomiting  Musculoskeletal: Positive for gait problem  Negative for arthralgias, joint swelling (joint stiffnes) and myalgias  Skin: Negative for rash  Neurological: Negative for dizziness, seizures and weakness  All other systems reviewed and are negative          Past Medical History:   Diagnosis Date    Constipation     Hyperlipidemia     Hypertension Past Surgical History:   Procedure Laterality Date     SECTION      CHOLECYSTECTOMY         Family History   Problem Relation Age of Onset    Tremor Neg Hx     Parkinsonism Neg Hx        Social History     Occupational History    Not on file   Tobacco Use    Smoking status: Former Smoker    Smokeless tobacco: Never Used   Substance and Sexual Activity    Alcohol use: Yes     Comment: Socially    Drug use: No    Sexual activity: Not Currently         Current Outpatient Medications:     ALPRAZolam (XANAX) 0 25 mg tablet, Take 1 tablet (0 25 mg total) by mouth 2 (two) times a day as needed for anxiety, Disp: 60 tablet, Rfl: 1    esomeprazole (NexIUM) 20 mg capsule, Take 20 mg by mouth every morning before breakfast, Disp: , Rfl:     latanoprost (XALATAN) 0 005 % ophthalmic solution, 1 drop daily at bedtime, Disp: , Rfl:     metoprolol tartrate (LOPRESSOR) 50 mg tablet, Take 1 tablet (50 mg total) by mouth 2 (two) times a day, Disp: 180 tablet, Rfl: 1    nortriptyline (PAMELOR) 50 mg capsule, Take 1 PO HS, Disp: 30 capsule, Rfl: 1    pravastatin (PRAVACHOL) 40 mg tablet, Take 40 mg by mouth daily  , Disp: , Rfl:     rasagiline (AZILECT) 1 MG, Take 0 5 mg by mouth daily, Disp: , Rfl:     timolol (BETIMOL) 0 5 % ophthalmic solution, Administer 1 drop to both eyes daily  , Disp: , Rfl:     No Known Allergies    Physical Exam:    /72 (BP Location: Left arm, Patient Position: Sitting, Cuff Size: Standard)   Pulse 65   Ht 4' 11" (1 499 m)   Wt 68 kg (150 lb)   LMP  (LMP Unknown)   BMI 30 30 kg/m²     Constitutional:normal, well developed, well nourished, alert, in no distress and non-toxic and no overt pain behavior    Eyes:anicteric  HEENT:grossly intact  Neck:supple, symmetric, trachea midline and no masses   Pulmonary:even and unlabored  Cardiovascular:No edema or pitting edema present  Skin:Normal without rashes or lesions and well hydrated  Psychiatric:Mood and affect appropriate  Neurologic:Cranial Nerves II-XII grossly intact  Musculoskeletal:The patient's gait is slightly antalgic, but steady without the use of any assistive devices  Imaging  No orders to display         No orders of the defined types were placed in this encounter

## 2020-03-11 ENCOUNTER — OFFICE VISIT (OUTPATIENT)
Dept: FAMILY MEDICINE CLINIC | Facility: CLINIC | Age: 75
End: 2020-03-11
Payer: MEDICARE

## 2020-03-11 VITALS
HEART RATE: 64 BPM | DIASTOLIC BLOOD PRESSURE: 80 MMHG | WEIGHT: 149.8 LBS | BODY MASS INDEX: 30.2 KG/M2 | TEMPERATURE: 98.7 F | SYSTOLIC BLOOD PRESSURE: 130 MMHG | OXYGEN SATURATION: 96 % | HEIGHT: 59 IN

## 2020-03-11 DIAGNOSIS — Z12.11 SCREENING FOR COLON CANCER: ICD-10-CM

## 2020-03-11 DIAGNOSIS — I70.0 ATHEROSCLEROSIS OF AORTA (HCC): ICD-10-CM

## 2020-03-11 DIAGNOSIS — T50.905A ADVERSE EFFECT OF DRUG, INITIAL ENCOUNTER: ICD-10-CM

## 2020-03-11 DIAGNOSIS — G20 PARKINSON DISEASE (HCC): ICD-10-CM

## 2020-03-11 DIAGNOSIS — Z12.39 SCREENING FOR BREAST CANCER: ICD-10-CM

## 2020-03-11 DIAGNOSIS — Z79.899 HIGH RISK MEDICATION USE: ICD-10-CM

## 2020-03-11 DIAGNOSIS — Z00.00 MEDICARE ANNUAL WELLNESS VISIT, SUBSEQUENT: Primary | ICD-10-CM

## 2020-03-11 DIAGNOSIS — Z13.6 SCREENING FOR CARDIOVASCULAR CONDITION: ICD-10-CM

## 2020-03-11 PROBLEM — M51.9 DISORDER OF INTERVERTEBRAL DISC: Status: ACTIVE | Noted: 2020-03-11

## 2020-03-11 PROBLEM — E78.5 HYPERLIPIDEMIA: Status: ACTIVE | Noted: 2020-03-11

## 2020-03-11 PROBLEM — G25.0 ESSENTIAL TREMOR: Status: ACTIVE | Noted: 2020-03-11

## 2020-03-11 PROCEDURE — 3008F BODY MASS INDEX DOCD: CPT | Performed by: FAMILY MEDICINE

## 2020-03-11 PROCEDURE — 1160F RVW MEDS BY RX/DR IN RCRD: CPT | Performed by: FAMILY MEDICINE

## 2020-03-11 PROCEDURE — 3075F SYST BP GE 130 - 139MM HG: CPT | Performed by: FAMILY MEDICINE

## 2020-03-11 PROCEDURE — 1125F AMNT PAIN NOTED PAIN PRSNT: CPT | Performed by: FAMILY MEDICINE

## 2020-03-11 PROCEDURE — 1036F TOBACCO NON-USER: CPT | Performed by: FAMILY MEDICINE

## 2020-03-11 PROCEDURE — 3079F DIAST BP 80-89 MM HG: CPT | Performed by: FAMILY MEDICINE

## 2020-03-11 PROCEDURE — G0439 PPPS, SUBSEQ VISIT: HCPCS | Performed by: FAMILY MEDICINE

## 2020-03-11 PROCEDURE — 1123F ACP DISCUSS/DSCN MKR DOCD: CPT | Performed by: FAMILY MEDICINE

## 2020-03-11 PROCEDURE — 1170F FXNL STATUS ASSESSED: CPT | Performed by: FAMILY MEDICINE

## 2020-03-11 PROCEDURE — 99214 OFFICE O/P EST MOD 30 MIN: CPT | Performed by: FAMILY MEDICINE

## 2020-03-11 NOTE — PROGRESS NOTES
Assessment/Plan:           Diagnoses and all orders for this visit:        Adverse effect of drug, initial encounter  Reviewed all the patient's medications  Will stop the nortriptyline she states it was not helping much with her back in given her significant side effects  Will continue the alprazolam at 0 25 milligrams no more than 1-2 a day as needed  Offered support for grief counseling if needed    Parkinson disease [facility])  She continues to follow with Neurology  Atherosclerosis of aorta [facility])  This is currently stable      Screening for colon cancer  -     Cologuard; Future    Screening for breast cancer  -     Mammo screening bilateral w 3d & cad; Future    Screening for cardiovascular condition  -     CBC and differential; Future  -     Comprehensive metabolic panel; Future  -     Lipid panel; Future  -     TSH, 3rd generation with Free T4 reflex; Future  -     UA (URINE) with reflex to Scope    High risk medication use  -     CBC and differential; Future  -     Comprehensive metabolic panel; Future  -     Lipid panel; Future  -     TSH, 3rd generation with Free T4 reflex; Future  -     UA (URINE) with reflex to Scope      labs ordered as med ordered  Color guard ordered  Medicare wellness visit completed see other note for this  Otherwise as far as her medication goes will stop the nortriptyline  Continue alprazolam patient will follow-up with me in 2-3 months or sooner if needed      Subjective:     Chief Complaint   Patient presents with    Follow-up     Medication follow up, when takes Nortriptyline feels anxious  Been coughing and nasal congested for 3-4days ago  Patient ID: Bree Meneses is a 66 y o  female      Patient is here for potential med issues  She states he was given nortriptyline by pain management  She has been taking this been having a lot of side effects  She took it was an ex recently and felt very dizzy  She has recently had a significant grief reaction to sudden passing of her brother and   That has affected her overall mood  She is still having significant back pain  But she follows with pain management for this      The following portions of the patient's history were reviewed and updated as appropriate: allergies, current medications, past family history, past medical history, past social history, past surgical history and problem list     Review of Systems   Constitutional: Negative  HENT: Negative  Eyes: Negative  Respiratory: Negative  Cardiovascular: Negative  Gastrointestinal: Negative  Endocrine: Negative  Genitourinary: Negative  Musculoskeletal: Positive for arthralgias and back pain  Skin: Negative  Allergic/Immunologic: Negative  Neurological: Negative  Hematological: Negative  Psychiatric/Behavioral: Positive for dysphoric mood  The patient is nervous/anxious  All other systems reviewed and are negative  Objective:    Vitals:    03/11/20 1500   BP: 130/80   BP Location: Right arm   Patient Position: Sitting   Cuff Size: Standard   Pulse: 64   Temp: 98 7 °F (37 1 °C)   TempSrc: Tympanic   SpO2: 96%   Weight: 67 9 kg (149 lb 12 8 oz)   Height: 4' 11" (1 499 m)          Physical Exam   Constitutional: She is oriented to person, place, and time  She appears well-developed and well-nourished  HENT:   Head: Normocephalic and atraumatic  Right Ear: External ear normal    Left Ear: External ear normal    Mouth/Throat: Oropharynx is clear and moist    Eyes: Pupils are equal, round, and reactive to light  Conjunctivae and EOM are normal    Neck: Normal range of motion  Cardiovascular: Normal rate, regular rhythm and normal heart sounds  Pulmonary/Chest: Effort normal and breath sounds normal    Abdominal: Soft  Bowel sounds are normal    Musculoskeletal: Normal range of motion  Needs help to get out of a chair   Neurological: She is alert and oriented to person, place, and time  She has normal reflexes     Essential tremor  Some Parkinson's like symptoms   Skin: Skin is warm and dry  Psychiatric: She has a normal mood and affect  Her behavior is normal  Judgment and thought content normal    Nursing note and vitals reviewed

## 2020-03-11 NOTE — PROGRESS NOTES
Assessment/Plan:     Diagnoses and all orders for this visit:    Medicare annual wellness visit, subsequent      Medicare wellness visit completed  See other note for acute/  chronic issues    Subjective:     CC: Medicare Wellness Visit       Patient ID: Telma Tabares is a 66 y o  female  Patient is here for Medicare wellness visit   see other note for acute /chronic issues      The following portions of the patient's history were reviewed and updated as appropriate: allergies, current medications, past family history, past medical history, past social history, past surgical history and problem list     Review of Systems   Constitutional: Negative  HENT: Negative  Eyes: Negative  Respiratory: Negative  Cardiovascular: Negative  Gastrointestinal: Negative  Endocrine: Negative  Genitourinary: Negative  Musculoskeletal: Negative  Skin: Negative  Allergic/Immunologic: Negative  Neurological: Negative  Hematological: Negative  Psychiatric/Behavioral: Negative  All other systems reviewed and are negative  Objective:    Vitals:    03/11/20 1500   BP: 130/80   BP Location: Right arm   Patient Position: Sitting   Cuff Size: Standard   Pulse: 64   Temp: 98 7 °F (37 1 °C)   TempSrc: Tympanic   SpO2: 96%   Weight: 67 9 kg (149 lb 12 8 oz)   Height: 4' 11" (1 499 m)          Physical Exam   Constitutional: She is oriented to person, place, and time  She appears well-developed and well-nourished  HENT:   Head: Normocephalic and atraumatic  Right Ear: External ear normal    Left Ear: External ear normal    Mouth/Throat: Oropharynx is clear and moist    Eyes: Pupils are equal, round, and reactive to light  Conjunctivae and EOM are normal    Neck: Normal range of motion  Cardiovascular: Normal rate, regular rhythm and normal heart sounds  Pulmonary/Chest: Effort normal and breath sounds normal    Abdominal: Soft   Bowel sounds are normal    Musculoskeletal: Normal range of motion  Neurological: She is alert and oriented to person, place, and time  She has normal reflexes  Skin: Skin is warm and dry  Psychiatric: She has a normal mood and affect  Her behavior is normal  Judgment and thought content normal    Nursing note and vitals reviewed  Assessment and Plan:     Problem List Items Addressed This Visit        Nervous and Auditory    Parkinson disease ([facility] )      Other Visit Diagnoses     Medicare annual wellness visit, subsequent    -  Primary    Adverse effect of drug, initial encounter        Atherosclerosis of aorta ([facility] )        Screening for colon cancer        Relevant Orders    Cologuard    Screening for breast cancer        Relevant Orders    Mammo screening bilateral w 3d & cad    Screening for cardiovascular condition        Relevant Orders    CBC and differential    Comprehensive metabolic panel    Lipid panel    TSH, 3rd generation with Free T4 reflex    UA (URINE) with reflex to Scope    High risk medication use        Relevant Orders    CBC and differential    Comprehensive metabolic panel    Lipid panel    TSH, 3rd generation with Free T4 reflex    UA (URINE) with reflex to Scope        BMI Counseling: Body mass index is 30 26 kg/m²  The BMI is above normal  Nutrition recommendations include decreasing portion sizes, encouraging healthy choices of fruits and vegetables, decreasing fast food intake, consuming healthier snacks, limiting drinks that contain sugar, moderation in carbohydrate intake, increasing intake of lean protein, reducing intake of saturated and trans fat and reducing intake of cholesterol  Exercise recommendations include exercising 3-5 times per week  Preventive health issues were discussed with patient, and age appropriate screening tests were ordered as noted in patient's After Visit Summary    Personalized health advice and appropriate referrals for health education or preventive services given if needed, as noted in patient's After Visit Summary       History of Present Illness:     Patient presents for Medicare Annual Wellness visit    Patient Care Team:  Shad Mast DO as PCP - General  MANE Bledsoe DO     Problem List:     Patient Active Problem List   Diagnosis    Abdominal aortic atherosclerosis ([facility] )    Ambulatory dysfunction    Anemia due to poor nutrition    Bicipital tendinitis of left shoulder    Cataract, left    Chronic anxiety    Chronic constipation    Gastroesophageal reflux disease    Glaucoma    Hypertension    Iron deficiency anemia    Low back pain    Lumbosacral spinal stenosis    Osteopenia    Parkinson disease ([facility] )    Post-viral cough syndrome    Lumbar spondylosis    Chronic pain syndrome    Need for immunization against influenza    Disorder of intervertebral disc    Essential tremor    Hyperlipidemia      Past Medical and Surgical History:     Past Medical History:   Diagnosis Date    Constipation     Hyperlipidemia     Hypertension      Past Surgical History:   Procedure Laterality Date     SECTION      CHOLECYSTECTOMY        Family History:     Family History   Problem Relation Age of Onset    Tremor Neg Hx     Parkinsonism Neg Hx       Social History:        Social History     Socioeconomic History    Marital status: /Civil Union     Spouse name: None    Number of children: None    Years of education: None    Highest education level: None   Occupational History    None   Social Needs    Financial resource strain: None    Food insecurity:     Worry: None     Inability: None    Transportation needs:     Medical: None     Non-medical: None   Tobacco Use    Smoking status: Former Smoker    Smokeless tobacco: Never Used   Substance and Sexual Activity    Alcohol use: Yes     Comment: Socially    Drug use: No    Sexual activity: Not Currently   Lifestyle    Physical activity:     Days per week: None     Minutes per session: None    Stress: None   Relationships    Social connections:     Talks on phone: None     Gets together: None     Attends Confucianism service: None     Active member of club or organization: None     Attends meetings of clubs or organizations: None     Relationship status: None    Intimate partner violence:     Fear of current or ex partner: None     Emotionally abused: None     Physically abused: None     Forced sexual activity: None   Other Topics Concern    None   Social History Narrative     at [employer]  Education through 12th grade  Home with  and dog (toy puddle)  3 daughters in the area  Medications and Allergies:     Current Outpatient Medications   Medication Sig Dispense Refill    ALPRAZolam (XANAX) 0 25 mg tablet Take 1 tablet (0 25 mg total) by mouth 2 (two) times a day as needed for anxiety 60 tablet 1    esomeprazole (NexIUM) 20 mg capsule Take 20 mg by mouth every morning before breakfast      latanoprost (XALATAN) 0 005 % ophthalmic solution 1 drop daily at bedtime      metoprolol tartrate (LOPRESSOR) 50 mg tablet Take 1 tablet (50 mg total) by mouth 2 (two) times a day 180 tablet 1    nortriptyline (PAMELOR) 50 mg capsule Take 1 PO HS 30 capsule 1    pravastatin (PRAVACHOL) 40 mg tablet Take 40 mg by mouth daily   rasagiline (AZILECT) 1 MG Take 0 5 mg by mouth daily      timolol (BETIMOL) 0 5 % ophthalmic solution Administer 1 drop to both eyes daily  No current facility-administered medications for this visit  No Known Allergies   Immunizations:     Immunization History   Administered Date(s) Administered    Influenza, high dose seasonal 0 5 mL 11/04/2019      Health Maintenance:         Topic Date Due    CRC Screening: Colonoscopy  04/04/2015     There are no preventive care reminders to display for this patient     Medicare Health Risk Assessment:     /80 (BP Location: Right arm, Patient Position: Sitting, Cuff Size: Standard)   Pulse 64   Temp 98 7 °F (37 1 °C) (Tympanic)   Ht 4' 11" (1 499 m)   Wt 67 9 kg (149 lb 12 8 oz)   LMP  (LMP Unknown)   SpO2 96%   BMI 30 26 kg/m²      Maxine Olson is here for her Subsequent Wellness visit  Health Risk Assessment:   Patient rates overall health as fair  Patient feels that their physical health rating is slightly worse  Eyesight was rated as slightly worse  Hearing was rated as slightly worse  Patient feels that their emotional and mental health rating is much worse  Pain experienced in the last 7 days has been some  Patient's pain rating has been 6/10  Patient states that she has experienced no weight loss or gain in last 6 months  Depression Screening:   PHQ-2 Score: 0      Fall Risk Screening: In the past year, patient has experienced: no history of falling in past year      Urinary Incontinence Screening:   Patient has not leaked urine accidently in the last six months  Home Safety:  Patient has trouble with stairs inside or outside of their home  Patient has working smoke alarms and has working carbon monoxide detector  Home safety hazards include: none  Nutrition:   Current diet is Regular  Medications:   Patient is currently taking over-the-counter supplements  OTC medications include: see medication list  Patient is able to manage medications  Activities of Daily Living (ADLs)/Instrumental Activities of Daily Living (IADLs):   Walk and transfer into and out of bed and chair?: Yes  Dress and groom yourself?: Yes    Bathe or shower yourself?: Yes    Feed yourself? Yes  Do your laundry/housekeeping?: Yes  Manage your money, pay your bills and track your expenses?: Yes  Make your own meals?: Yes    Do your own shopping?: Yes    Previous Hospitalizations:   Any hospitalizations or ED visits within the last 12 months?: No      Advance Care Planning:   Living will: No    Durable POA for healthcare: No    Advanced directive: Yes    Advanced directive counseling given: No    Five wishes given:  Yes End of Life Decisions reviewed with patient: Yes    Provider agrees with end of life decisions: Yes      Cognitive Screening:   Provider or family/friend/caregiver concerned regarding cognition?: No    PREVENTIVE SCREENINGS      Cardiovascular Screening:    General: History Lipid Disorder and Risks and Benefits Discussed    Due for: Lipid Panel      Diabetes Screening:     General: Risks and Benefits Discussed    Due for: Blood Glucose      Colorectal Cancer Screening:     General: Risks and Benefits Discussed    Due for: Cologuard      Breast Cancer Screening:     General: Risks and Benefits Discussed    Due for: Mammogram        Cervical Cancer Screening:    General: Screening Not Indicated      Osteoporosis Screening:    General: Risks and Benefits Discussed and Patient Declines      Abdominal Aortic Aneurysm (AAA) Screening:        General: Screening Not Indicated      Lung Cancer Screening:     General: Screening Not Indicated      Hepatitis C Screening:    General: Screening Not Indicated    Other Counseling Topics:   Alcohol use counseling, car/seat belt/driving safety, skin self-exam, sunscreen and calcium and vitamin D intake and regular weightbearing exercise         Deejay Page, DO

## 2020-03-11 NOTE — PATIENT INSTRUCTIONS
Medicare Preventive Visit Patient Instructions  Thank you for completing your Welcome to Medicare Visit or Medicare Annual Wellness Visit today  Your next wellness visit will be due in one year (3/11/2021)  The screening/preventive services that you may require over the next 5-10 years are detailed below  Some tests may not apply to you based off risk factors and/or age  Screening tests ordered at today's visit but not completed yet may show as past due  Also, please note that scanned in results may not display below  Preventive Screenings:  Service Recommendations Previous Testing/Comments   Colorectal Cancer Screening  * Colonoscopy    * Fecal Occult Blood Test (FOBT)/Fecal Immunochemical Test (FIT)  * Fecal DNA/Cologuard Test  * Flexible Sigmoidoscopy Age: 54-65 years old   Colonoscopy: every 10 years (may be performed more frequently if at higher risk)  OR  FOBT/FIT: every 1 year  OR  Cologuard: every 3 years  OR  Sigmoidoscopy: every 5 years  Screening may be recommended earlier than age 48 if at higher risk for colorectal cancer  Also, an individualized decision between you and your healthcare provider will decide whether screening between the ages of 74-80 would be appropriate  Colonoscopy: 04/04/2005  FOBT/FIT: Not on file  Cologuard: Not on file  Sigmoidoscopy: Not on file    Risks and Benefits Discussed     Breast Cancer Screening Age: 36 years old  Frequency: every 1-2 years  Not required if history of left and right mastectomy Mammogram: 06/22/2015    Risks and Benefits Discussed   Cervical Cancer Screening Between the ages of 21-29, pap smear recommended once every 3 years  Between the ages of 33-67, can perform pap smear with HPV co-testing every 5 years     Recommendations may differ for women with a history of total hysterectomy, cervical cancer, or abnormal pap smears in past  Pap Smear: Not on file    Screening Not Indicated   Hepatitis C Screening Once for adults born between 1945 and 1965  More frequently in patients at high risk for Hepatitis C Hep C Antibody: Not on file    Screening Not Indicated   Diabetes Screening 1-2 times per year if you're at risk for diabetes or have pre-diabetes Fasting glucose: 95 mg/dL   A1C: 6 1 %    Risks and Benefits Discussed   Cholesterol Screening Once every 5 years if you don't have a lipid disorder  May order more often based on risk factors  Lipid panel: 11/06/2017    History Lipid Disorder  Risks and Benefits Discussed     Other Preventive Screenings Covered by Medicare:  1  Abdominal Aortic Aneurysm (AAA) Screening: covered once if your at risk  You're considered to be at risk if you have a family history of AAA  2  Lung Cancer Screening: covers low dose CT scan once per year if you meet all of the following conditions: (1) Age 50-69; (2) No signs or symptoms of lung cancer; (3) Current smoker or have quit smoking within the last 15 years; (4) You have a tobacco smoking history of at least 30 pack years (packs per day multiplied by number of years you smoked); (5) You get a written order from a healthcare provider  3  Glaucoma Screening: covered annually if you're considered high risk: (1) You have diabetes OR (2) Family history of glaucoma OR (3)  aged 48 and older OR (3)  American aged 72 and older  3  Osteoporosis Screening: covered every 2 years if you meet one of the following conditions: (1) You're estrogen deficient and at risk for osteoporosis based off medical history and other findings; (2) Have a vertebral abnormality; (3) On glucocorticoid therapy for more than 3 months; (4) Have primary hyperparathyroidism; (5) On osteoporosis medications and need to assess response to drug therapy  · Last bone density test (DXA Scan): 07/13/2015  5  HIV Screening: covered annually if you're between the age of 12-76  Also covered annually if you are younger than 13 and older than 72 with risk factors for HIV infection   For pregnant patients, it is covered up to 3 times per pregnancy  Immunizations:  Immunization Recommendations   Influenza Vaccine Annual influenza vaccination during flu season is recommended for all persons aged >= 6 months who do not have contraindications   Pneumococcal Vaccine (Prevnar and Pneumovax)  * Prevnar = PCV13  * Pneumovax = PPSV23   Adults 25-60 years old: 1-3 doses may be recommended based on certain risk factors  Adults 72 years old: Prevnar (PCV13) vaccine recommended followed by Pneumovax (PPSV23) vaccine  If already received PPSV23 since turning 65, then PCV13 recommended at least one year after PPSV23 dose  Hepatitis B Vaccine 3 dose series if at intermediate or high risk (ex: diabetes, end stage renal disease, liver disease)   Tetanus (Td) Vaccine - COST NOT COVERED BY MEDICARE PART B Following completion of primary series, a booster dose should be given every 10 years to maintain immunity against tetanus  Td may also be given as tetanus wound prophylaxis  Tdap Vaccine - COST NOT COVERED BY MEDICARE PART B Recommended at least once for all adults  For pregnant patients, recommended with each pregnancy  Shingles Vaccine (Shingrix) - COST NOT COVERED BY MEDICARE PART B  2 shot series recommended in those aged 48 and above     Health Maintenance Due:      Topic Date Due    CRC Screening: Colonoscopy  04/04/2015     Immunizations Due:  There are no preventive care reminders to display for this patient  Advance Directives   What are advance directives? Advance directives are legal documents that state your wishes and plans for medical care  These plans are made ahead of time in case you lose your ability to make decisions for yourself  Advance directives can apply to any medical decision, such as the treatments you want, and if you want to donate organs  What are the types of advance directives? There are many types of advance directives, and each state has rules about how to use them  You may choose a combination of any of the following:  · Living will: This is a written record of the treatment you want  You can also choose which treatments you do not want, which to limit, and which to stop at a certain time  This includes surgery, medicine, IV fluid, and tube feedings  · Durable power of  for healthcare [facility]): This is a written record that states who you want to make healthcare choices for you when you are unable to make them for yourself  This person, called a proxy, is usually a family member or a friend  You may choose more than 1 proxy  · Do not resuscitate (DNR) order:  A DNR order is used in case your heart stops beating or you stop breathing  It is a request not to have certain forms of treatment, such as CPR  A DNR order may be included in other types of advance directives  · Medical directive: This covers the care that you want if you are in a coma, near death, or unable to make decisions for yourself  You can list the treatments you want for each condition  Treatment may include pain medicine, surgery, blood transfusions, dialysis, IV or tube feedings, and a ventilator (breathing machine)  · Values history: This document has questions about your views, beliefs, and how you feel and think about life  This information can help others choose the care that you would choose  Why are advance directives important? An advance directive helps you control your care  Although spoken wishes may be used, it is better to have your wishes written down  Spoken wishes can be misunderstood, or not followed  Treatments may be given even if you do not want them  An advance directive may make it easier for your family to make difficult choices about your care  Weight Management   Why it is important to manage your weight:  Being overweight increases your risk of health conditions such as heart disease, high blood pressure, type 2 diabetes, and certain types of cancer   It can also increase your risk for osteoarthritis, sleep apnea, and other respiratory problems  Aim for a slow, steady weight loss  Even a small amount of weight loss can lower your risk of health problems  How to lose weight safely:  A safe and healthy way to lose weight is to eat fewer calories and get regular exercise  You can lose up about 1 pound a week by decreasing the number of calories you eat by 500 calories each day  Healthy meal plan for weight management:  A healthy meal plan includes a variety of foods, contains fewer calories, and helps you stay healthy  A healthy meal plan includes the following:  · Eat whole-grain foods more often  A healthy meal plan should contain fiber  Fiber is the part of grains, fruits, and vegetables that is not broken down by your body  Whole-grain foods are healthy and provide extra fiber in your diet  Some examples of whole-grain foods are whole-wheat breads and pastas, oatmeal, brown rice, and bulgur  · Eat a variety of vegetables every day  Include dark, leafy greens such as spinach, kale, consuelo greens, and mustard greens  Eat yellow and orange vegetables such as carrots, sweet potatoes, and winter squash  · Eat a variety of fruits every day  Choose fresh or canned fruit (canned in its own juice or light syrup) instead of juice  Fruit juice has very little or no fiber  · Eat low-fat dairy foods  Drink fat-free (skim) milk or 1% milk  Eat fat-free yogurt and low-fat cottage cheese  Try low-fat cheeses such as mozzarella and other reduced-fat cheeses  · Choose meat and other protein foods that are low in fat  Choose beans or other legumes such as split peas or lentils  Choose fish, skinless poultry (chicken or turkey), or lean cuts of red meat (beef or pork)  Before you cook meat or poultry, cut off any visible fat  · Use less fat and oil  Try baking foods instead of frying them   Add less fat, such as margarine, sour cream, regular salad dressing and mayonnaise to foods  Eat fewer high-fat foods  Some examples of high-fat foods include french fries, doughnuts, ice cream, and cakes  · Eat fewer sweets  Limit foods and drinks that are high in sugar  This includes candy, cookies, regular soda, and sweetened drinks  Exercise:  Exercise at least 30 minutes per day on most days of the week  Some examples of exercise include walking, biking, dancing, and swimming  You can also fit in more physical activity by taking the stairs instead of the elevator or parking farther away from stores  Ask your healthcare provider about the best exercise plan for you  © Copyright [employer] 2018 Information is for End User's use only and may not be sold, redistributed or otherwise used for commercial purposes   All illustrations and images included in CareNotes® are the copyrighted property of A D A M , Inc  or [address] [place], [place] [address]pa[facility]

## 2020-04-09 ENCOUNTER — TELEMEDICINE (OUTPATIENT)
Dept: PAIN MEDICINE | Facility: CLINIC | Age: 75
End: 2020-04-09
Payer: MEDICARE

## 2020-04-09 DIAGNOSIS — R26.2 AMBULATORY DYSFUNCTION: ICD-10-CM

## 2020-04-09 DIAGNOSIS — M48.07 LUMBOSACRAL SPINAL STENOSIS: ICD-10-CM

## 2020-04-09 DIAGNOSIS — M54.50 CHRONIC BILATERAL LOW BACK PAIN WITHOUT SCIATICA: ICD-10-CM

## 2020-04-09 DIAGNOSIS — M51.9 DISORDER OF INTERVERTEBRAL DISC: ICD-10-CM

## 2020-04-09 DIAGNOSIS — G89.29 CHRONIC BILATERAL LOW BACK PAIN WITHOUT SCIATICA: ICD-10-CM

## 2020-04-09 DIAGNOSIS — M47.816 LUMBAR SPONDYLOSIS: ICD-10-CM

## 2020-04-09 DIAGNOSIS — G20 PARKINSON DISEASE (HCC): ICD-10-CM

## 2020-04-09 DIAGNOSIS — G89.4 CHRONIC PAIN SYNDROME: Primary | ICD-10-CM

## 2020-04-09 PROCEDURE — G2012 BRIEF CHECK IN BY MD/QHP: HCPCS | Performed by: NURSE PRACTITIONER

## 2020-04-21 ENCOUNTER — TELEPHONE (OUTPATIENT)
Dept: NEUROLOGY | Facility: CLINIC | Age: 75
End: 2020-04-21

## 2020-05-14 DIAGNOSIS — I10 HYPERTENSION, ESSENTIAL: ICD-10-CM

## 2020-05-14 RX ORDER — METOPROLOL TARTRATE 50 MG/1
TABLET, FILM COATED ORAL
Qty: 180 TABLET | Refills: 0 | OUTPATIENT
Start: 2020-05-14

## 2020-05-14 RX ORDER — METOPROLOL TARTRATE 50 MG/1
50 TABLET, FILM COATED ORAL 2 TIMES DAILY
Qty: 180 TABLET | Refills: 1 | Status: CANCELLED | OUTPATIENT
Start: 2020-05-14

## 2020-05-14 RX ORDER — METOPROLOL TARTRATE 50 MG/1
50 TABLET, FILM COATED ORAL 2 TIMES DAILY
Qty: 180 TABLET | Refills: 1 | Status: SHIPPED | OUTPATIENT
Start: 2020-05-14 | End: 2021-05-06 | Stop reason: SDUPTHER

## 2020-05-18 DIAGNOSIS — F41.9 ANXIETY: ICD-10-CM

## 2020-05-18 RX ORDER — ALPRAZOLAM 0.25 MG/1
0.25 TABLET ORAL 2 TIMES DAILY PRN
Qty: 60 TABLET | Refills: 1 | Status: SHIPPED | OUTPATIENT
Start: 2020-05-18 | End: 2020-08-17 | Stop reason: ALTCHOICE

## 2020-06-08 ENCOUNTER — OFFICE VISIT (OUTPATIENT)
Dept: PAIN MEDICINE | Facility: CLINIC | Age: 75
End: 2020-06-08
Payer: MEDICARE

## 2020-06-08 VITALS
SYSTOLIC BLOOD PRESSURE: 128 MMHG | WEIGHT: 145 LBS | HEART RATE: 58 BPM | TEMPERATURE: 98.4 F | DIASTOLIC BLOOD PRESSURE: 82 MMHG | HEIGHT: 59 IN | BODY MASS INDEX: 29.23 KG/M2

## 2020-06-08 DIAGNOSIS — M48.07 LUMBOSACRAL SPINAL STENOSIS: ICD-10-CM

## 2020-06-08 DIAGNOSIS — G89.29 CHRONIC BILATERAL LOW BACK PAIN WITHOUT SCIATICA: ICD-10-CM

## 2020-06-08 DIAGNOSIS — M54.50 CHRONIC BILATERAL LOW BACK PAIN WITHOUT SCIATICA: ICD-10-CM

## 2020-06-08 DIAGNOSIS — M54.16 LUMBAR RADICULOPATHY: ICD-10-CM

## 2020-06-08 DIAGNOSIS — M47.816 LUMBAR SPONDYLOSIS: ICD-10-CM

## 2020-06-08 DIAGNOSIS — G89.4 CHRONIC PAIN SYNDROME: Primary | ICD-10-CM

## 2020-06-08 PROCEDURE — 1160F RVW MEDS BY RX/DR IN RCRD: CPT | Performed by: NURSE PRACTITIONER

## 2020-06-08 PROCEDURE — 99214 OFFICE O/P EST MOD 30 MIN: CPT | Performed by: NURSE PRACTITIONER

## 2020-06-08 PROCEDURE — 3079F DIAST BP 80-89 MM HG: CPT | Performed by: NURSE PRACTITIONER

## 2020-06-08 PROCEDURE — 1036F TOBACCO NON-USER: CPT | Performed by: NURSE PRACTITIONER

## 2020-06-08 PROCEDURE — 3074F SYST BP LT 130 MM HG: CPT | Performed by: NURSE PRACTITIONER

## 2020-06-08 PROCEDURE — 3008F BODY MASS INDEX DOCD: CPT | Performed by: NURSE PRACTITIONER

## 2020-07-02 ENCOUNTER — HOSPITAL ENCOUNTER (OUTPATIENT)
Dept: RADIOLOGY | Facility: CLINIC | Age: 75
Discharge: HOME | End: 2020-07-02
Attending: ANESTHESIOLOGY
Payer: MEDICARE

## 2020-07-02 VITALS
OXYGEN SATURATION: 93 % | TEMPERATURE: 99.2 F | SYSTOLIC BLOOD PRESSURE: 163 MMHG | HEART RATE: 68 BPM | RESPIRATION RATE: 20 BRPM | DIASTOLIC BLOOD PRESSURE: 80 MMHG

## 2020-07-02 DIAGNOSIS — M54.50 CHRONIC BILATERAL LOW BACK PAIN WITHOUT SCIATICA: ICD-10-CM

## 2020-07-02 DIAGNOSIS — R26.2 AMBULATORY DYSFUNCTION: Primary | ICD-10-CM

## 2020-07-02 DIAGNOSIS — M48.07 LUMBOSACRAL SPINAL STENOSIS: ICD-10-CM

## 2020-07-02 DIAGNOSIS — G89.29 CHRONIC BILATERAL LOW BACK PAIN WITHOUT SCIATICA: ICD-10-CM

## 2020-07-02 PROCEDURE — 62323 NJX INTERLAMINAR LMBR/SAC: CPT | Performed by: ANESTHESIOLOGY

## 2020-07-02 RX ORDER — LIDOCAINE HYDROCHLORIDE 10 MG/ML
5 INJECTION, SOLUTION EPIDURAL; INFILTRATION; INTRACAUDAL; PERINEURAL ONCE
Status: COMPLETED | OUTPATIENT
Start: 2020-07-02 | End: 2020-07-02

## 2020-07-02 RX ORDER — METHYLPREDNISOLONE ACETATE 80 MG/ML
160 INJECTION, SUSPENSION INTRA-ARTICULAR; INTRALESIONAL; INTRAMUSCULAR; PARENTERAL; SOFT TISSUE ONCE
Status: COMPLETED | OUTPATIENT
Start: 2020-07-02 | End: 2020-07-02

## 2020-07-02 RX ADMIN — LIDOCAINE HYDROCHLORIDE 3 ML: 10 INJECTION, SOLUTION EPIDURAL; INFILTRATION; INTRACAUDAL; PERINEURAL at 13:46

## 2020-07-02 RX ADMIN — METHYLPREDNISOLONE ACETATE 160 MG: 80 INJECTION, SUSPENSION INTRA-ARTICULAR; INTRALESIONAL; INTRAMUSCULAR; SOFT TISSUE at 13:47

## 2020-07-02 RX ADMIN — IOHEXOL 1 ML: 300 INJECTION, SOLUTION INTRAVENOUS at 13:47

## 2020-07-02 NOTE — DISCHARGE INSTRUCTIONS
Epidural Steroid Injection   WHAT YOU NEED TO KNOW:   An epidural steroid injection (ZACHERY) is a procedure to inject steroid medicine into the epidural space  The epidural space is between your spinal cord and vertebrae  Steroids reduce inflammation and fluid buildup in your spine that may be causing pain  You may be given pain medicine along with the steroids  ACTIVITY  · Do not drive or operate machinery today  · No strenuous activity today - bending, lifting, etc   · You may resume normal activites starting tomorrow - start slowly and as tolerated  · You may shower today, but no tub baths or hot tubs  · You may have numbness for several hours from the local anesthetic  Please use caution and common sense, especially with weight-bearing activities  CARE OF THE INJECTION SITE  · If you have soreness or pain, apply ice to the area today (20 minutes on/20 minutes off)  · Starting tomorrow, you may use warm, moist heat or ice if needed  · You may have an increase or change in your discomfort for 36-48 hours after your treatment  · Apply ice and continue with any pain medication you have been prescribed  · [facility]ify [facility] if you have any of the following: redness, drainage, swelling, headache, stiff neck or fever above 100°F     SPECIAL INSTRUCTIONS  · Our office will contact you in approximately 7 days for a progress report  MEDICATIONS  · Continue to take all routine medications  · Our office may have instructed you to hold some medications  If you have a problem specifically related to your procedure, please call our office at [phone]  Problems not related to your procedure should be directed to your primary care physician

## 2020-07-02 NOTE — H&P
History of Present Illness: The patient is a 66 y o  female who presents with complaints of low back and leg pain  Patient Active Problem List   Diagnosis    Abdominal aortic atherosclerosis ([address] )    Ambulatory dysfunction    Anemia due to poor nutrition    Bicipital tendinitis of left shoulder    Cataract, left    Chronic anxiety    Chronic constipation    Gastroesophageal reflux disease    Glaucoma    Hypertension    Iron deficiency anemia    Low back pain    Lumbosacral spinal stenosis    Osteopenia    Parkinson disease (HCC)    Post-viral cough syndrome    Lumbar spondylosis    Chronic pain syndrome    Need for immunization against influenza    Disorder of intervertebral disc    Essential tremor    Hyperlipidemia    Lumbar radiculopathy       Past Medical History:   Diagnosis Date    Constipation     Hyperlipidemia     Hypertension        Past Surgical History:   Procedure Laterality Date     SECTION      CHOLECYSTECTOMY           Current Outpatient Medications:     ALPRAZolam (XANAX) 0 25 mg tablet, Take 1 tablet (0 25 mg total) by mouth 2 (two) times a day as needed for anxiety, Disp: 60 tablet, Rfl: 1    esomeprazole (NexIUM) 20 mg capsule, Take 20 mg by mouth every morning before breakfast, Disp: , Rfl:     latanoprost (XALATAN) 0 005 % ophthalmic solution, 1 drop daily at bedtime, Disp: , Rfl:     metoprolol tartrate (LOPRESSOR) 50 mg tablet, Take 1 tablet (50 mg total) by mouth 2 (two) times a day, Disp: 180 tablet, Rfl: 1    pravastatin (PRAVACHOL) 40 mg tablet, Take 40 mg by mouth daily  , Disp: , Rfl:     rasagiline (AZILECT) 1 MG, Take 0 5 mg by mouth daily, Disp: , Rfl:     timolol (BETIMOL) 0 5 % ophthalmic solution, Administer 1 drop to both eyes daily  , Disp: , Rfl:     Current Facility-Administered Medications:     iohexol (OMNIPAQUE) 300 mg/mL injection 50 mL, 50 mL, Epidural, Once, Elliot Mercer DO    lidocaine (PF) (XYLOCAINE-MPF) 1 % injection 5 mL, 5 mL, Other, Once, Elliot Mercer DO    methylPREDNISolone acetate (DEPO-MEDROL) injection 160 mg, 160 mg, Epidural, Once, Elliot Mercer DO    No Known Allergies    Physical Exam:   General: Awake, Alert, Oriented x 3, Mood and affect appropriate  Respiratory: Respirations even and unlabored  Cardiovascular: Peripheral pulses intact; no edema  Musculoskeletal Exam:  Decreased range of motion lumbar spine    ASA Score: II         Assessment:   1  Chronic bilateral low back pain without sciatica    2   Lumbosacral spinal stenosis        Plan: L5-S1 LESI

## 2020-07-09 ENCOUNTER — TELEPHONE (OUTPATIENT)
Dept: PAIN MEDICINE | Facility: CLINIC | Age: 75
End: 2020-07-09

## 2020-07-16 ENCOUNTER — HOSPITAL ENCOUNTER (OUTPATIENT)
Dept: RADIOLOGY | Facility: CLINIC | Age: 75
Discharge: HOME | End: 2020-07-16
Attending: ANESTHESIOLOGY | Admitting: ANESTHESIOLOGY
Payer: MEDICARE

## 2020-07-16 VITALS
DIASTOLIC BLOOD PRESSURE: 82 MMHG | HEART RATE: 60 BPM | SYSTOLIC BLOOD PRESSURE: 153 MMHG | OXYGEN SATURATION: 96 % | TEMPERATURE: 98.5 F | RESPIRATION RATE: 20 BRPM

## 2020-07-16 DIAGNOSIS — M48.07 LUMBOSACRAL SPINAL STENOSIS: ICD-10-CM

## 2020-07-16 DIAGNOSIS — M54.50 CHRONIC BILATERAL LOW BACK PAIN WITHOUT SCIATICA: ICD-10-CM

## 2020-07-16 DIAGNOSIS — G89.29 CHRONIC BILATERAL LOW BACK PAIN WITHOUT SCIATICA: ICD-10-CM

## 2020-07-16 DIAGNOSIS — M54.16 LUMBAR RADICULOPATHY: ICD-10-CM

## 2020-07-16 PROCEDURE — 62323 NJX INTERLAMINAR LMBR/SAC: CPT | Performed by: ANESTHESIOLOGY

## 2020-07-16 RX ORDER — METHYLPREDNISOLONE ACETATE 80 MG/ML
160 INJECTION, SUSPENSION INTRA-ARTICULAR; INTRALESIONAL; INTRAMUSCULAR; PARENTERAL; SOFT TISSUE ONCE
Status: COMPLETED | OUTPATIENT
Start: 2020-07-16 | End: 2020-07-16

## 2020-07-16 RX ORDER — LIDOCAINE HYDROCHLORIDE 10 MG/ML
5 INJECTION, SOLUTION EPIDURAL; INFILTRATION; INTRACAUDAL; PERINEURAL ONCE
Status: COMPLETED | OUTPATIENT
Start: 2020-07-16 | End: 2020-07-16

## 2020-07-16 RX ADMIN — LIDOCAINE HYDROCHLORIDE 3 ML: 10 INJECTION, SOLUTION EPIDURAL; INFILTRATION; INTRACAUDAL; PERINEURAL at 13:44

## 2020-07-16 RX ADMIN — IOHEXOL 1 ML: 300 INJECTION, SOLUTION INTRAVENOUS at 13:44

## 2020-07-16 RX ADMIN — METHYLPREDNISOLONE ACETATE 160 MG: 80 INJECTION, SUSPENSION INTRA-ARTICULAR; INTRALESIONAL; INTRAMUSCULAR; SOFT TISSUE at 13:44

## 2020-07-16 NOTE — DISCHARGE INSTRUCTIONS
Epidural Steroid Injection   WHAT YOU NEED TO KNOW:   An epidural steroid injection (ZACHERY) is a procedure to inject steroid medicine into the epidural space  The epidural space is between your spinal cord and vertebrae  Steroids reduce inflammation and fluid buildup in your spine that may be causing pain  You may be given pain medicine along with the steroids  ACTIVITY  · Do not drive or operate machinery today  · No strenuous activity today - bending, lifting, etc   · You may resume normal activites starting tomorrow - start slowly and as tolerated  · You may shower today, but no tub baths or hot tubs  · You may have numbness for several hours from the local anesthetic  Please use caution and common sense, especially with weight-bearing activities  CARE OF THE INJECTION SITE  · If you have soreness or pain, apply ice to the area today (20 minutes on/20 minutes off)  · Starting tomorrow, you may use warm, moist heat or ice if needed  · You may have an increase or change in your discomfort for 36-48 hours after your treatment  · Apply ice and continue with any pain medication you have been prescribed  · Notify [facility] if you have any of the following: redness, drainage, swelling, headache, stiff neck or fever above 100°F     SPECIAL INSTRUCTIONS  · Our office will contact you in approximately 7 days for a progress report  MEDICATIONS  · Continue to take all routine medications  · Our office may have instructed you to hold some medications  If you have a problem specifically related to your procedure, please call our office at [phone]  Problems not related to your procedure should be directed to your primary care physician

## 2020-07-16 NOTE — H&P
History of Present Illness: The patient is a 66 y o  female who presents with complaints of low back and leg pain  Patient Active Problem List   Diagnosis    Abdominal aortic atherosclerosis ([address] )    Ambulatory dysfunction    Anemia due to poor nutrition    Bicipital tendinitis of left shoulder    Cataract, left    Chronic anxiety    Chronic constipation    Gastroesophageal reflux disease    Glaucoma    Hypertension    Iron deficiency anemia    Low back pain    Lumbosacral spinal stenosis    Osteopenia    Parkinson disease (HCC)    Post-viral cough syndrome    Lumbar spondylosis    Chronic pain syndrome    Need for immunization against influenza    Disorder of intervertebral disc    Essential tremor    Hyperlipidemia    Lumbar radiculopathy       Past Medical History:   Diagnosis Date    Constipation     Hyperlipidemia     Hypertension        Past Surgical History:   Procedure Laterality Date     SECTION      CHOLECYSTECTOMY           Current Outpatient Medications:     ALPRAZolam (XANAX) 0 25 mg tablet, Take 1 tablet (0 25 mg total) by mouth 2 (two) times a day as needed for anxiety, Disp: 60 tablet, Rfl: 1    esomeprazole (NexIUM) 20 mg capsule, Take 20 mg by mouth every morning before breakfast, Disp: , Rfl:     latanoprost (XALATAN) 0 005 % ophthalmic solution, 1 drop daily at bedtime, Disp: , Rfl:     metoprolol tartrate (LOPRESSOR) 50 mg tablet, Take 1 tablet (50 mg total) by mouth 2 (two) times a day, Disp: 180 tablet, Rfl: 1    pravastatin (PRAVACHOL) 40 mg tablet, Take 40 mg by mouth daily  , Disp: , Rfl:     rasagiline (AZILECT) 1 MG, Take 0 5 mg by mouth daily, Disp: , Rfl:     timolol (BETIMOL) 0 5 % ophthalmic solution, Administer 1 drop to both eyes daily  , Disp: , Rfl:     No Known Allergies    Physical Exam:   Vitals:    20 1322   BP: 167/88   Pulse: 58   Resp: 20   Temp: 98 5 °F (36 9 °C)   SpO2: 96%     General: Awake, Alert, Oriented x 3, Mood and affect appropriate  Respiratory: Respirations even and unlabored  Cardiovascular: Peripheral pulses intact; no edema  Musculoskeletal Exam:  Decreased range of motion lumbar spine    ASA Score: II    Patient/Chart Verification  Patient ID Verified: Verbal  ID Band Applied: No  Consents Confirmed: Procedural  H&P( within 30 days) Verified: To be obtained in the Pre-Procedure area  Interval H&P(within 24 hr) Complete (required for Outpatients and Surgery Admit only): To be obtained in the Pre-Procedure area  Allergies Reviewed: Yes  Anticoag/NSAID held?: No  Currently on antibiotics?: No  Pre-op Lab/Test Results Available: N/A  Pregnancy Lab Collected: N/A comment    Assessment:   1  Chronic bilateral low back pain without sciatica    2  Lumbosacral spinal stenosis    3   Lumbar radiculopathy        Plan: L5-S1 LESI #2

## 2020-07-23 ENCOUNTER — TELEPHONE (OUTPATIENT)
Dept: PAIN MEDICINE | Facility: CLINIC | Age: 75
End: 2020-07-23

## 2020-08-03 ENCOUNTER — OFFICE VISIT (OUTPATIENT)
Dept: PAIN MEDICINE | Facility: CLINIC | Age: 75
End: 2020-08-03
Payer: MEDICARE

## 2020-08-03 VITALS
HEART RATE: 62 BPM | WEIGHT: 145 LBS | DIASTOLIC BLOOD PRESSURE: 72 MMHG | SYSTOLIC BLOOD PRESSURE: 140 MMHG | TEMPERATURE: 98 F | BODY MASS INDEX: 29.23 KG/M2 | HEIGHT: 59 IN

## 2020-08-03 DIAGNOSIS — M51.9 DISORDER OF INTERVERTEBRAL DISC: ICD-10-CM

## 2020-08-03 DIAGNOSIS — M54.50 CHRONIC BILATERAL LOW BACK PAIN WITHOUT SCIATICA: ICD-10-CM

## 2020-08-03 DIAGNOSIS — M48.07 LUMBOSACRAL SPINAL STENOSIS: ICD-10-CM

## 2020-08-03 DIAGNOSIS — G89.4 CHRONIC PAIN SYNDROME: Primary | ICD-10-CM

## 2020-08-03 DIAGNOSIS — M47.816 LUMBAR SPONDYLOSIS: ICD-10-CM

## 2020-08-03 DIAGNOSIS — G89.29 CHRONIC BILATERAL LOW BACK PAIN WITHOUT SCIATICA: ICD-10-CM

## 2020-08-03 DIAGNOSIS — M54.16 LUMBAR RADICULOPATHY: ICD-10-CM

## 2020-08-03 PROCEDURE — 3008F BODY MASS INDEX DOCD: CPT | Performed by: NURSE PRACTITIONER

## 2020-08-03 PROCEDURE — 3077F SYST BP >= 140 MM HG: CPT | Performed by: NURSE PRACTITIONER

## 2020-08-03 PROCEDURE — 99213 OFFICE O/P EST LOW 20 MIN: CPT | Performed by: NURSE PRACTITIONER

## 2020-08-03 PROCEDURE — 3078F DIAST BP <80 MM HG: CPT | Performed by: NURSE PRACTITIONER

## 2020-08-03 PROCEDURE — 1160F RVW MEDS BY RX/DR IN RCRD: CPT | Performed by: NURSE PRACTITIONER

## 2020-08-03 PROCEDURE — 1036F TOBACCO NON-USER: CPT | Performed by: NURSE PRACTITIONER

## 2020-08-03 NOTE — PROGRESS NOTES
Assessment:  1  Chronic pain syndrome    2  Chronic bilateral low back pain without sciatica    3  Lumbar radiculopathy    4  Lumbosacral spinal stenosis    5  Lumbar spondylosis    6  Disorder of intervertebral disc        Plan:  While the patient was in the office today, I did have a thorough conversation with the patient regarding their chronic pain syndrome, symptoms, medication regimen, and treatment plan  I discussed with the patient that since there has been moderate-significant improvement in the pain symptoms that we will hold off on any repeat injections at this point in time  However, I did explain that if over the next 8-12 weeks the pain symptoms should return, worsen, and/or change, we could consider a repeat injection  If after the 12 weeks and OV would be warranted for re-evaluation  The patient was agreeable and verbalized an understanding  I discussed with the patient that at this point time she can followup with our office on an as-needed basis  I did review the patient that if her pain symptoms should change, worsen, and/or if she would experience any new symptoms as she would like to be evaluated for, she should give our office a call  The patient was agreeable and verbalized an understanding  History of Present Illness: The patient is a 66 y o  female last seen on 7/16/20 who presents for a follow up office visit in regards to chronic pain syndrome secondary to lumbar spondylosis and stenosis with radiculopathy  The patient currently reports that at this point time as a result of her repeat L5-S1 interlaminar lumbar epidural steroid injection with Dr Joelle Monzon on July 16, 2020 she is noting at least 50% overall improvement  The patient's  feels that she can stand and walk a little bit better when she gets up and moving in the morning and for now the patient would like to hold off any repeat injections and see how she does      I have personally reviewed and/or updated the patient's past medical history, past surgical history, family history, social history, current medications, allergies, and vital signs today  Review of Systems:    Review of Systems   Respiratory: Negative for shortness of breath  Cardiovascular: Negative for chest pain  Gastrointestinal: Negative for constipation, diarrhea, nausea and vomiting  Musculoskeletal: Positive for gait problem (difficulty walking decreased rom ) and joint swelling (joint stiffness)  Negative for arthralgias and myalgias  Skin: Negative for rash  Neurological: Negative for dizziness, seizures and weakness  All other systems reviewed and are negative  Past Medical History:   Diagnosis Date    Constipation     Hyperlipidemia     Hypertension        Past Surgical History:   Procedure Laterality Date     SECTION      CHOLECYSTECTOMY         Family History   Problem Relation Age of Onset    Tremor Neg Hx     Parkinsonism Neg Hx        Social History     Occupational History    Not on file   Tobacco Use    Smoking status: Former Smoker    Smokeless tobacco: Never Used   Substance and Sexual Activity    Alcohol use: Yes     Comment: Socially    Drug use: No    Sexual activity: Not Currently         Current Outpatient Medications:     esomeprazole (NexIUM) 20 mg capsule, Take 20 mg by mouth every morning before breakfast, Disp: , Rfl:     latanoprost (XALATAN) 0 005 % ophthalmic solution, 1 drop daily at bedtime, Disp: , Rfl:     metoprolol tartrate (LOPRESSOR) 50 mg tablet, Take 1 tablet (50 mg total) by mouth 2 (two) times a day, Disp: 180 tablet, Rfl: 1    pravastatin (PRAVACHOL) 40 mg tablet, Take 40 mg by mouth daily  , Disp: , Rfl:     rasagiline (AZILECT) 1 MG, Take 0 5 mg by mouth daily, Disp: , Rfl:     timolol (BETIMOL) 0 5 % ophthalmic solution, Administer 1 drop to both eyes daily  , Disp: , Rfl:     ALPRAZolam (XANAX) 0 25 mg tablet, Take 1 tablet (0 25 mg total) by mouth 2 (two) times a day as needed for anxiety, Disp: 60 tablet, Rfl: 1    No Known Allergies    Physical Exam:    /72   Pulse 62   Temp 98 °F (36 7 °C)   Ht 4' 11"   Wt 65 8 kg (145 lb)   LMP  (LMP Unknown)   BMI 29 29 kg/m²     Constitutional:normal, well developed, well nourished, alert, in no distress and non-toxic and no overt pain behavior  Eyes:anicteric  HEENT:grossly intact  Neck:supple, symmetric, trachea midline and no masses   Pulmonary:even and unlabored  Cardiovascular:No edema or pitting edema present  Skin:Normal without rashes or lesions and well hydrated  Psychiatric:Mood and affect appropriate  Neurologic:Cranial Nerves II-XII grossly intact  Musculoskeletal:The patients gait was slightly antalgic, but steady with the asst of one  Imaging  No orders to display         No orders of the defined types were placed in this encounter

## 2020-08-11 DIAGNOSIS — G20 PARKINSON DISEASE (HCC): Primary | ICD-10-CM

## 2020-08-11 RX ORDER — RASAGILINE 1 MG/1
1 TABLET ORAL DAILY
Qty: 90 EACH | Refills: 3 | Status: SHIPPED | OUTPATIENT
Start: 2020-08-11 | End: 2021-08-25 | Stop reason: SDUPTHER

## 2020-08-17 ENCOUNTER — OFFICE VISIT (OUTPATIENT)
Dept: FAMILY MEDICINE CLINIC | Facility: CLINIC | Age: 75
End: 2020-08-17
Payer: MEDICARE

## 2020-08-17 ENCOUNTER — APPOINTMENT (OUTPATIENT)
Dept: RADIOLOGY | Facility: CLINIC | Age: 75
End: 2020-08-17
Payer: MEDICARE

## 2020-08-17 VITALS
BODY MASS INDEX: 29.03 KG/M2 | SYSTOLIC BLOOD PRESSURE: 130 MMHG | DIASTOLIC BLOOD PRESSURE: 80 MMHG | WEIGHT: 144 LBS | HEART RATE: 66 BPM | HEIGHT: 59 IN | TEMPERATURE: 98.5 F

## 2020-08-17 DIAGNOSIS — R52 PAIN: Primary | ICD-10-CM

## 2020-08-17 DIAGNOSIS — R52 PAIN: ICD-10-CM

## 2020-08-17 PROCEDURE — 3079F DIAST BP 80-89 MM HG: CPT | Performed by: NURSE PRACTITIONER

## 2020-08-17 PROCEDURE — 3008F BODY MASS INDEX DOCD: CPT | Performed by: NURSE PRACTITIONER

## 2020-08-17 PROCEDURE — 71101 X-RAY EXAM UNILAT RIBS/CHEST: CPT

## 2020-08-17 PROCEDURE — 3075F SYST BP GE 130 - 139MM HG: CPT | Performed by: NURSE PRACTITIONER

## 2020-08-17 PROCEDURE — 99214 OFFICE O/P EST MOD 30 MIN: CPT | Performed by: NURSE PRACTITIONER

## 2020-08-17 PROCEDURE — 1036F TOBACCO NON-USER: CPT | Performed by: NURSE PRACTITIONER

## 2020-08-17 PROCEDURE — 1160F RVW MEDS BY RX/DR IN RCRD: CPT | Performed by: NURSE PRACTITIONER

## 2020-08-17 NOTE — PROGRESS NOTES
Assessment/Plan   Problem List Items Addressed This Visit     None      Visit Diagnoses     Pain    -  Primary    Relevant Orders    XR ribs right w pa chest min 3 views                Chief Complaint   Patient presents with    Fall     injured right ribs       Subjective   Patient ID: Margarito Berry is a 66 y o  female  Vitals:    08/17/20 1406   BP: 130/80   Pulse: 66   Temp: 98 5 °F (36 9 °C)     Here today with spouse, reports fell last week, "lost footing " in the yard and "went down onto right side"  Pain in lateral right mid rib area  Painful inspiration  Denies SOB No respiratory distress  Using Aleve PRN - relieves pain  Will send for chest and ribs films  The following portions of the patient's history were reviewed and updated as appropriate: allergies, past family history, past medical history, past social history, past surgical history and problem list     Review of Systems   Constitutional: Negative  HENT: Negative  Eyes: Negative  Respiratory: Negative  Negative for cough, chest tightness and shortness of breath  Cardiovascular: Positive for chest pain  Gastrointestinal: Negative  Endocrine: Negative  Genitourinary: Negative  Musculoskeletal: Positive for gait problem  Negative for arthralgias and myalgias  Skin: Negative  Allergic/Immunologic: Negative  Neurological: Positive for tremors (history of PArkingson's dx)  Hematological: Negative  Psychiatric/Behavioral: Negative  Objective     Physical Exam  Constitutional:       General: She is not in acute distress  Appearance: Normal appearance  She is not ill-appearing  HENT:      Head: Normocephalic and atraumatic  Nose: Nose normal       Mouth/Throat:      Mouth: Mucous membranes are moist    Eyes:      General:         Right eye: No discharge  Left eye: No discharge  Extraocular Movements: Extraocular movements intact        Conjunctiva/sclera: Conjunctivae normal    Neck: Musculoskeletal: Normal range of motion and neck supple  Cardiovascular:      Rate and Rhythm: Normal rate and regular rhythm  Pulses: Normal pulses  Heart sounds: Normal heart sounds  Pulmonary:      Effort: Pulmonary effort is normal  No respiratory distress  Breath sounds: Normal breath sounds  No wheezing or rhonchi  Abdominal:      General: Abdomen is flat  Bowel sounds are normal    Musculoskeletal: Normal range of motion  General: No tenderness or deformity  Skin:     General: Skin is warm  Capillary Refill: Capillary refill takes less than 2 seconds  Neurological:      Mental Status: She is alert and oriented to person, place, and time  Cranial Nerves: No cranial nerve deficit  Sensory: No sensory deficit  Motor: No weakness  Coordination: Coordination normal       Gait: Gait normal       Deep Tendon Reflexes: Reflexes normal    Psychiatric:         Mood and Affect: Mood normal          Behavior: Behavior normal          Thought Content:  Thought content normal          Judgment: Judgment normal

## 2020-08-17 NOTE — PATIENT INSTRUCTIONS
Rib Contusion   WHAT YOU NEED TO KNOW:   A rib contusion is a bruise on one or more of your ribs  DISCHARGE INSTRUCTIONS:   Return to the emergency department if:   · You have increased chest pain  · You have shortness of breath  · You start to cough up blood  · Your pain does not improve with pain medicine  Contact your healthcare provider if:   · You have a cough  · You have a fever  · You have questions or concerns about your condition or care  Medicines: You may need any of the following:  · NSAIDs , such as ibuprofen, help decrease swelling, pain, and fever  This medicine is available with or without a doctor's order  NSAIDs can cause stomach bleeding or kidney problems in certain people  If you take blood thinner medicine, always ask if NSAIDs are safe for you  Always read the medicine label and follow directions  Do not give these medicines to children under 10months of age without direction from your child's healthcare provider  · Prescription pain medicine  may be given  Ask how to take this medicine safely  · Take your medicine as directed  Contact your healthcare provider if you think your medicine is not helping or if you have side effects  Tell him of her if you are allergic to any medicine  Keep a list of the medicines, vitamins, and herbs you take  Include the amounts, and when and why you take them  Bring the list or the pill bottles to follow-up visits  Carry your medicine list with you in case of an emergency  Deep breathing:   · To help prevent pneumonia, take 10 deep breaths every hour, even when you wake up during the night  Brace your ribs with your hands or a pillow while you take deep breaths or cough  This will help decrease your pain  · You may need to use an incentive spirometer to help you take deeper breaths  Put the plastic piece into your mouth and take a very deep breath  Hold your breath as long as you can  Then let out your breath   Do this 10 times in a row every hour while you are awake  Rest:  Rest your ribs to decrease swelling and allow the injury to heal faster  Avoid activities that may cause more pain or damage to your ribs  As your pain decreases, begin movements slowly  Ice:  Ice helps decrease swelling and pain  Ice may also help prevent tissue damage  Use an ice pack or put crushed ice in a plastic bag  Cover it with a towel and place it on your bruised area for 15 to 20 minutes every hour as directed  Follow up with your healthcare provider as directed:  Write down your questions so you remember to ask them during your visits  © 2017 [facility] Information is for End User's use only and may not be sold, redistributed or otherwise used for commercial purposes  All illustrations and images included in CareNotes® are the copyrighted property of [employer]  or [employer]ukhjinder [employer]  The above information is an  only  It is not intended as medical advice for individual conditions or treatments  Talk to your doctor, nurse or pharmacist before following any medical regimen to see if it is safe and effective for you

## 2020-08-25 DIAGNOSIS — F41.9 ANXIETY: ICD-10-CM

## 2020-08-25 RX ORDER — ALPRAZOLAM 0.25 MG/1
TABLET ORAL
Qty: 60 TABLET | Refills: 0 | OUTPATIENT
Start: 2020-08-25

## 2020-08-27 ENCOUNTER — TELEPHONE (OUTPATIENT)
Dept: FAMILY MEDICINE CLINIC | Facility: CLINIC | Age: 75
End: 2020-08-27

## 2020-08-27 DIAGNOSIS — F41.9 CHRONIC ANXIETY: Primary | ICD-10-CM

## 2020-08-27 RX ORDER — ALPRAZOLAM 0.25 MG/1
0.25 TABLET ORAL 2 TIMES DAILY PRN
Qty: 60 TABLET | Refills: 1 | Status: SHIPPED | OUTPATIENT
Start: 2020-08-27 | End: 2020-11-30 | Stop reason: SDUPTHER

## 2020-08-27 NOTE — TELEPHONE ENCOUNTER
Patient called, she needs her alprazolam called into the [address] in [place]  She said she has been taking it for years  Looking back in the notes, it looks like  25mg  I don't know what happened to the medication list   Thank you

## 2020-09-11 ENCOUNTER — TELEPHONE (OUTPATIENT)
Dept: NEUROLOGY | Facility: CLINIC | Age: 75
End: 2020-09-11

## 2020-09-11 ENCOUNTER — OFFICE VISIT (OUTPATIENT)
Dept: NEUROLOGY | Facility: CLINIC | Age: 75
End: 2020-09-11
Payer: MEDICARE

## 2020-09-11 VITALS
SYSTOLIC BLOOD PRESSURE: 136 MMHG | TEMPERATURE: 96.3 F | BODY MASS INDEX: 29.03 KG/M2 | DIASTOLIC BLOOD PRESSURE: 63 MMHG | HEART RATE: 61 BPM | HEIGHT: 59 IN | WEIGHT: 144 LBS

## 2020-09-11 DIAGNOSIS — G20 PARKINSON DISEASE (HCC): Primary | ICD-10-CM

## 2020-09-11 DIAGNOSIS — I95.1 ORTHOSTATIC HYPOTENSION: ICD-10-CM

## 2020-09-11 DIAGNOSIS — R41.3 MEMORY LOSS: ICD-10-CM

## 2020-09-11 PROCEDURE — 99214 OFFICE O/P EST MOD 30 MIN: CPT | Performed by: PSYCHIATRY & NEUROLOGY

## 2020-09-11 RX ORDER — DONEPEZIL HYDROCHLORIDE 5 MG/1
5 TABLET, FILM COATED ORAL
Qty: 30 TABLET | Refills: 5 | Status: SHIPPED | OUTPATIENT
Start: 2020-09-11 | End: 2021-01-13 | Stop reason: SINTOL

## 2020-09-11 NOTE — ASSESSMENT & PLAN NOTE
38-year-old woman presents in follow-up for Parkinson's disease  The patient was last here 13 months ago and has seen little progression in her motor symptoms  Her daughter does raise some concerns about her cognition in general and her driving ability  We discussed this in good detail  I recommend that the patient hold off on driving until completing a fitness to drive evaluation  We will also had on 5 mg of Aricept at bedtime which can be increased to 10 mg if well tolerated  She has been experiencing some mild balance troubles and was agreeable to physical therapy    The patient was mildly orthostatic today which we should continue to monitor      Follow-up in 4 months

## 2020-09-11 NOTE — PATIENT INSTRUCTIONS
Parkinson's Disease Resources     Helpful web sites   -  www daPulse  org   -  www parkinson  org (the [employer])   -  www [employer] ([address] for [employer]) - Order the "Every Victory Counts" leanne under the "resources" tab  Or visit [facility] org/EV or call 455-024-7456  - [facility]  org/resources/parkinsons-exercise-essentials   - Contact the [employer] for an "Aware in care kit" @ 2840.613.6234 (this is a kit to take with you if you ever have to go to the hospital)   - www pmdalliance  org  -  parkinsons  yaritza edu/exercise_videos  html  - [facility] Exercise helpline: (692) 508-7886  - [employer]    - Check out "[employer] [address] [facility]" for help paying for your medications: www tafcares  org

## 2020-09-11 NOTE — PROGRESS NOTES
Assessment/Plan:    Parkinson disease [facility])  69-year-old woman presents in follow-up for Parkinson's disease  The patient was last here 13 months ago and has seen little progression in her motor symptoms  Her daughter does raise some concerns about her cognition in general and her driving ability  We discussed this in good detail  I recommend that the patient hold off on driving until completing a fitness to drive evaluation  We will also had on 5 mg of Aricept at bedtime which can be increased to 10 mg if well tolerated  She has been experiencing some mild balance troubles and was agreeable to physical therapy    The patient was mildly orthostatic today which we should continue to monitor  Follow-up in 4 months      Diagnoses and all orders for this visit:    Orthostatic hypotension    Parkinson disease ([facility] [address] )  -     Ambulatory referral to Occupational Therapy; Future  -     Ambulatory referral to Physical Therapy; Future    Memory loss  -     donepezil (ARICEPT) 5 mg tablet; Take 1 tablet (5 mg total) by mouth daily at bedtime        Subjective:     Patient ID: Twyla Hernandez 66 y o  female    I had the pleasure of seeing your patient, Twyla Hernandez in the [address] at the [facility]  To review, Alphonso Freeman is a 66year old right handed woman who presents in follow up for Parkinson's disease, symptom onset in 2016 with right arm tremor  On initial evaluation she had asymmetric rigidity R>L, rest tremor which emerges when walking and focal bradykinesia with decrement  She presented on Azilect monotherapy with good symptom control      Current medications and timing:   - Rasaglinine 1mg, 1 tab daily     Interval history:  Tremor isnt too bothersome  Just when worked up  Some spread into the left  Some fatigue limitations  Back pain  Walking is ok but gets fatigue  Sometimes slow per daughter  Sometimes she walks very quickly  Almost festination     1 fall a couple of weeks ago  Had trouble getting up  Was outside and called her   Thankfully she had her phone on her  Lots of steps in the house   doesn't want to move  Slowed reaction time  Cognitively and motor  Still driving  Daughters are concerned  No accidents  More memory troubles  Trouble with names  Trouble with story recall  The following portions of the patient's history were reviewed and updated as appropriate: allergies, current medications, past family history, past medical history, past social history, past surgical history and problem list     Objective:  /63   Pulse 61   Temp (!) 96 3 °F (35 7 °C) (Temporal)   Ht 4' 11" (1 499 m)   Wt 65 3 kg (144 lb)   LMP  (LMP Unknown)   BMI 29 08 kg/m²     Physical Exam    Neurological Exam    GENERAL MEDICAL EXAMINATION:  General appearance: alert, in no apparent distress  Appropriately dressed and groomed  Conversing and interacting appropriately  Eyes: Sclera are non-injected  Ears, nose, Mouth, Throat: Mucous membranes are moist    Resp: Breathing comfortably on RA   Musculoskeletal: No evidence of deformities  No contractures  No Edema  Skin: No visible rashes  Warm and well perfused  Psych: normal and appropriate affect     Mental Status:  Alert and oriented to person place and time  Able to relate history without difficulty  Attentive to conversation  Language is fluent and appropriate with normal prosody  There were no paraphasic errors  Speech was not dysarthric  Able to follow both midline and appendicular commands       General Neurology examination:     UPDRS motor:                              Time since last dose:       Speech  1     Facial Expression  +     Rigidity - Neck  0     Rigidity - Upper Extremity (Right)  2     Rigidity - Upper Extremity (Left)   1     Rigidity - Lower Extremity (Right)  1     Rigidity - Lower Extremity (Left)   0     Finger Taps (Right)   2     Finger Taps (Left)   2     Hand Movement (Right)  3     Hand Movement (Left)   2     Pronation/Supination (Right)  3     Pronation/Supination (Left)   3     Toe Tapping (Right) 2     Toe Tapping (Left) 1     Leg Agility (Right)  2     Leg Agility (Left)   2     Arising from Chair   2     Gait   2     Freezing of Gait 0     Postural Stability        Posture 2     Global spontaneity of movement 2     Postural Tremor (Right) 0     Postural Tremor (Left) 0     Kinetic Tremor (Right)  0     Kinetic Tremor (Left)  0     Rest tremor amplitude RUE 3     Rest tremor amplitude LUE 2     Rest tremor amplitude RLE 1     Reset tremor amplitude LLE 0     Lip/Jaw Tremor  0     Consistency of tremor 1-2     Motor Exam Total:          Dysmetria: none on FNF  Dyskinesia: none  Dystonia: none     Stance: narrow-based and stable   Stride: slowed speed, reduced and asymmetric stride length and step height  walks with no assistive device   Arm swing: reduced bl  Turn: stable, 3 steps       Review of Systems   Constitutional: Negative  Negative for appetite change and fever  HENT: Negative  Negative for hearing loss, tinnitus, trouble swallowing and voice change  Eyes: Negative  Negative for photophobia and pain  Respiratory: Negative  Negative for shortness of breath  Cardiovascular: Negative  Negative for palpitations  Gastrointestinal: Positive for constipation  Negative for nausea and vomiting  Endocrine: Negative  Negative for cold intolerance  Genitourinary: Negative  Negative for dysuria, frequency and urgency  Musculoskeletal: Negative  Negative for myalgias and neck pain  Skin: Negative  Negative for rash  Neurological: Negative  Negative for dizziness, tremors, seizures, syncope, facial asymmetry, speech difficulty, weakness, light-headedness, numbness and headaches  Hematological: Negative  Does not bruise/bleed easily  Psychiatric/Behavioral: Negative  Negative for confusion, hallucinations and sleep disturbance       The above ROS was reviewed and updated  Current Outpatient Medications on File Prior to Visit   Medication Sig Dispense Refill    ALPRAZolam (XANAX) 0 25 mg tablet Take 1 tablet (0 25 mg total) by mouth 2 (two) times a day as needed for anxiety 60 tablet 1    esomeprazole (NexIUM) 20 mg capsule Take 20 mg by mouth every morning before breakfast      latanoprost (XALATAN) 0 005 % ophthalmic solution 1 drop daily at bedtime      metoprolol tartrate (LOPRESSOR) 50 mg tablet Take 1 tablet (50 mg total) by mouth 2 (two) times a day 180 tablet 1    pravastatin (PRAVACHOL) 40 mg tablet Take 40 mg by mouth daily   rasagiline (AZILECT) 1 MG Take 1 tablet (1 mg total) by mouth daily 90 each 3    timolol (BETIMOL) 0 5 % ophthalmic solution Administer 1 drop to both eyes daily  No current facility-administered medications on file prior to visit          Edmund Webb MD  Medical Director   [facility]  [facility] Specialist

## 2020-09-30 ENCOUNTER — TELEPHONE (OUTPATIENT)
Dept: NEUROLOGY | Facility: CLINIC | Age: 75
End: 2020-09-30

## 2020-10-27 ENCOUNTER — TELEPHONE (OUTPATIENT)
Dept: FAMILY MEDICINE CLINIC | Facility: CLINIC | Age: 75
End: 2020-10-27

## 2020-11-30 ENCOUNTER — TELEPHONE (OUTPATIENT)
Dept: FAMILY MEDICINE CLINIC | Facility: CLINIC | Age: 75
End: 2020-11-30

## 2020-11-30 DIAGNOSIS — F41.9 CHRONIC ANXIETY: ICD-10-CM

## 2020-11-30 RX ORDER — ALPRAZOLAM 0.25 MG/1
0.25 TABLET ORAL 2 TIMES DAILY PRN
Qty: 60 TABLET | Refills: 1 | Status: SHIPPED | OUTPATIENT
Start: 2020-11-30 | End: 2021-04-19 | Stop reason: SDUPTHER

## 2020-11-30 RX ORDER — ALPRAZOLAM 0.25 MG/1
TABLET ORAL
Qty: 60 TABLET | Refills: 0 | OUTPATIENT
Start: 2020-11-30

## 2021-01-11 NOTE — PROGRESS NOTES
Patient ID: Bob Mchugh is a 66 y o  female  Assessment/Plan:    Parkinson disease ([facility] )  Parkinson's disease  with increased bradykinesia on exam and postural instability  Time spent discussing PD, its prognosis and progression  She has done well on rasagiline up until this point but appears to be having more difficulty with mobility  Will continue on rasagiline 1mg daily  Will add Sinemet 25/100 1 tab 3 times daily (taekn 5-6 hours apart)  She will call if she develops side effects  Encouraged to exercise and remain active  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 ([facility] )    Will remain off donepezil for now given she felt it made her tremor worse  Will reassess cognition on follow up and consider treatment  MOCA today was 15/30  Fitness to drive test is once again recommended if should she wish to continue to drive   At this point based on exam she was informed not to drive  Approximately 44 minutes was spent on chart review, reviewing prior workup, patient evaluation, counseling and instruction  Diagnoses and all orders for this visit:    Parkinson disease ([facility] )  -     carbidopa-levodopa (SINEMET)  mg per tablet; Take 1 tablet by mouth 3 (three) times a day  -     Ambulatory referral to Occupational Therapy; Future    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 [facility])  -     Ambulatory referral to Occupational Therapy; Future           Subjective:       Ms Puja Jasso is a right handed woman who presents for Movement follow up for Parkinson's disease  She was previous followed by Dr Argentina Regalado of his notes and chart reveal symptoms began with right arm tremor in 2016 Frist seen on Azilect with asymmetric rigidity R>L, rest tremor bradykinesia with decrement  Last seen September 2020 with her daughter having raise some concerns regarding her cognition and her driving ability  Ms Puja Jasso was to hold off on driving until completing a fitness to drive evaluation   Aricept was started  She called stating Aricept caused increase tremor and she discontinued it  Her daughter Lidia Cline called yesterday with concerns  She reported worsening memory  FTD evaluation was never completed and there is a concern regarding her driving  There is a concern regarding her drinking on the weekends  There is a questions as to whether she should continue PT and be encouraged to use a walker or cane  Tremor- denies but is evident on exam, mild and on the right hand and leg   Speech is unchanged  Saliva and drooling- mild at times  Chewing and swallowing without difficulty  Dressing - she needs assistance with socks but otherwise independent   Hygiene- mild slowness but independent  Arising out of chair - mild difficulty they attribute to arthritis   helps her get out of bed  Walking and balance Is unchanged  Sleeps well  She occasionally talks in sleep  Daytime sedation- mild  Urinary problems-no   Constipation- no  Lightheadedness on standing-no   Fatigue- mild   Cognitive impairment- she denies any difficulty   Psychosis/ hallucinations- no          The following portions of the patient's history were reviewed and updated as appropriate: allergies, current medications, past family history, past medical history, past social history, past surgical history and problem list          Objective:    Blood pressure 128/62, pulse 63, weight 65 8 kg (145 lb)  Physical Exam  Eyes:      Extraocular Movements: Extraocular movements intact  Pupils: Pupils are equal, round, and reactive to light  Neurological:      Mental Status: She is alert  Deep Tendon Reflexes: Strength normal       Reflex Scores:       Tricep reflexes are 1+ on the right side and 1+ on the left side  Bicep reflexes are 1+ on the right side and 1+ on the left side  Patellar reflexes are 2+ on the right side and 2+ on the left side         Achilles reflexes are 2+ on the right side and 2+ on the left side  Neurological Exam  Mental Status  Alert  Oriented only to person and situation  Unable to copy figure  Speech: hypophonia  Language is fluent with no aphasia  Able to name objects  Unable to perform serial calculations  MOCA 15/30  Cranial Nerves  CN III, IV, VI: Extraocular movements intact bilaterally  Pupils equal round and reactive to light bilaterally  CN V: Facial sensation is normal   CN VII: Full and symmetric facial movement  CN VIII: Hearing is normal   CN XI: Shoulder shrug strength is normal     Motor  Normal muscle bulk throughout  Normal muscle tone  Strength is 5/5 throughout all four extremities  Sensory  Light touch is normal in upper and lower extremities  Pinprick is normal in upper and lower extremities  Reflexes                                           Right                      Left  Biceps                                 1+                         1+  Triceps                                1+                         1+  Patellar                                2+                         2+  Achilles                                2+                         2+    Coordination  Right: Finger-to-nose normal  Rapid alternating movement abnormality:  Left: Finger-to-nose normal  Rapid alternating movement abnormality:  See MDS UPDRS III  Gait  Casual gait: Abnormal pull test  Would fall if not caught  Unable to rise from chair without using arms  Arose on second attempt with difficulty     Decreased stride and arm swing  Cliff Silvana     UPDRS motor:                              Time since last dose:   9/2020 1/13/21   Speech  1  1   Facial Expression  +     Rigidity - Neck  0  2   Rigidity - Upper Extremity (Right)  2  2   Rigidity - Upper Extremity (Left)   1  1   Rigidity - Lower Extremity (Right)  1  1   Rigidity - Lower Extremity (Left)   0  0   Finger Taps (Right)   2  3   Finger Taps (Left)   2  3   Hand Movement (Right)  3  2   Hand Movement (Left)   2  1 Pronation/Supination (Right)  3  2   Pronation/Supination (Left)   3  2   Toe Tapping (Right) 2  2   Toe Tapping (Left) 1  2   Leg Agility (Right)  2  2   Leg Agility (Left)   2  2   Arising from Chair   2  1   Gait   2  2   Freezing of Gait 0  0   Postural Stability      2   Posture 2  2   Global spontaneity of movement 2  2   Postural Tremor (Right) 0  0   Postural Tremor (Left) 0  0   Kinetic Tremor (Right)  0  0   Kinetic Tremor (Left)  0  0   Rest tremor amplitude RUE 3  2   Rest tremor amplitude LUE 2  1   Rest tremor amplitude RLE 1  1   Reset tremor amplitude LLE 0  0   Lip/Jaw Tremor  0  0   Consistency of tremor 1-2  1   Motor Exam Total:                 ROS:    Review of Systems   Constitutional: Negative  Negative for appetite change and fever  HENT: Negative  Negative for hearing loss, tinnitus, trouble swallowing and voice change  Eyes: Negative  Negative for photophobia and pain  Respiratory: Negative  Negative for shortness of breath  Cardiovascular: Negative  Negative for palpitations  Gastrointestinal: Negative  Negative for nausea and vomiting  Endocrine: Negative  Negative for cold intolerance  Genitourinary: Negative  Negative for dysuria, frequency and urgency  Musculoskeletal: Positive for gait problem (cant walk a far distance)  Negative for myalgias and neck pain  Skin: Negative  Negative for rash  Allergic/Immunologic: Negative  Neurological: Positive for tremors (A little bit in right hand)  Negative for dizziness, seizures, syncope, facial asymmetry, speech difficulty, weakness, light-headedness, numbness and headaches  Hematological: Negative  Does not bruise/bleed easily  Psychiatric/Behavioral: Negative  Negative for confusion, hallucinations and sleep disturbance  All other systems reviewed and are negative  Review of system was personally reviewed

## 2021-01-12 ENCOUNTER — TELEPHONE (OUTPATIENT)
Dept: NEUROLOGY | Facility: CLINIC | Age: 76
End: 2021-01-12

## 2021-01-12 NOTE — TELEPHONE ENCOUNTER
LETHAI:  Patient has an appt tomorrow with Dr Sejal Romero, previous [place] patient  Patient's daughter, Vilma Powers calling because she stated she won't be able to come to the visit and knows that the patient and her  aren't always forthcoming because patient's  is in denial that there are any issues  Daughter stated that she has some concerns that she wanted to bring to Dr Vinay Park attention because  doesn't want to take help and won't ask for help  Patient stopped the aricept after a few days but should be on something because her memory is worsening  She stated the patient never did the FTD eval  Daughter is concerned with how she drives  Also she wants to have the patient continue PT, but she doesn't think that the patient will mention this  She's also concerned that the patient drinks excessive amounts on the weekends and knows that this can affect medication but nobody will bring this up with the patient/  She also refuses to use a walker or cane and thinks that she should be using one  Vilma Powers [phone]  Okay to leave vm

## 2021-01-13 ENCOUNTER — CONSULT (OUTPATIENT)
Dept: NEUROLOGY | Facility: CLINIC | Age: 76
End: 2021-01-13
Payer: MEDICARE

## 2021-01-13 VITALS
BODY MASS INDEX: 29.29 KG/M2 | DIASTOLIC BLOOD PRESSURE: 62 MMHG | WEIGHT: 145 LBS | SYSTOLIC BLOOD PRESSURE: 128 MMHG | HEART RATE: 63 BPM

## 2021-01-13 DIAGNOSIS — G20 PARKINSON DISEASE (HCC): Primary | ICD-10-CM

## 2021-01-13 DIAGNOSIS — G20 COGNITIVE DEFICIT DUE TO PARKINSON'S DISEASE (HCC): ICD-10-CM

## 2021-01-13 PROBLEM — G20.A1 COGNITIVE DEFICIT DUE TO PARKINSON'S DISEASE: Status: ACTIVE | Noted: 2021-01-13

## 2021-01-13 PROCEDURE — 99215 OFFICE O/P EST HI 40 MIN: CPT | Performed by: PSYCHIATRY & NEUROLOGY

## 2021-01-13 NOTE — ASSESSMENT & PLAN NOTE
Parkinson's disease  with increased bradykinesia on exam and postural instability  Time spent discussing PD, its prognosis and progression  She has done well on rasagiline up until this point but appears to be having more difficulty with mobility  Will continue on rasagiline 1mg daily  Will add Sinemet 25/100 1 tab 3 times daily (taekn 5-6 hours apart)  She will call if she develops side effects  Encouraged to exercise and remain active

## 2021-01-13 NOTE — ASSESSMENT & PLAN NOTE
Will remain off donepezil for now given she felt it made her tremor worse  Will reassess cognition on follow up and consider treatment  MOCA today was 15/30  Fitness to drive test is once again recommended if should she wish to continue to drive   At this point based on exam she was informed not to drive  Approximately 44 minutes was spent on chart review, reviewing prior workup, patient evaluation, counseling and instruction

## 2021-01-13 NOTE — PATIENT INSTRUCTIONS
Increased slowness on exam  Will continue on rasagiline 1mg daily  Will add Sinemet 25/100 1 tab 3 times daily (taekn 5-6 hours apart)  Call if you develop side effects  Will remain off donepezil for now  Will reassess cognition on follow up  MOCA today was 15/30  Fitness to drive test is once again recommended if you wish to continue to drive   At this point based on exam you should not be driving  Encouraged to exercise and remain active

## 2021-01-19 ENCOUNTER — TELEPHONE (OUTPATIENT)
Dept: NEUROLOGY | Facility: CLINIC | Age: 76
End: 2021-01-19

## 2021-01-19 NOTE — TELEPHONE ENCOUNTER
Patient's  calling to report side effects that the patient had after taking sinemet  Said that she started the medication today for the first time this morning  About an hour after taking the medication the patient began sweating, became nauseous, and vomited  Said that the patient does not want to take this medication anymore  Asking for your recommendations  Please advise    Mu Banuelos: [phone]: Ok to leave a detailed message

## 2021-01-20 NOTE — TELEPHONE ENCOUNTER
Called and discussed with Martinez Castillo  He asked patient and said that she is afraid of the medication and does not want to take the medication anymore  Asking for an alternative medication       Please advise

## 2021-01-26 NOTE — TELEPHONE ENCOUNTER
We could try a long acting dopamine agonist if covered such as Neupro patch or ropinirole XL2mg   Side effects such as nausea, lightheadedness and hallucinations are possible and we would stop it if itoccured     Alternatively she stays off medication for now and retrial after next visit

## 2021-02-03 NOTE — TELEPHONE ENCOUNTER
Called and reviewed with [facility]  Patient declines wanting to try another medication at this time  She will hold off until May appt     FYI

## 2021-02-12 DIAGNOSIS — Z23 ENCOUNTER FOR IMMUNIZATION: ICD-10-CM

## 2021-03-19 ENCOUNTER — IMMUNIZATIONS (OUTPATIENT)
Dept: FAMILY MEDICINE CLINIC | Facility: HOSPITAL | Age: 76
End: 2021-03-19

## 2021-03-19 DIAGNOSIS — Z23 ENCOUNTER FOR IMMUNIZATION: Primary | ICD-10-CM

## 2021-03-19 PROCEDURE — 0001A SARS-COV-2 / COVID-19 MRNA VACCINE (PFIZER-BIONTECH) 30 MCG: CPT

## 2021-03-19 PROCEDURE — 91300 SARS-COV-2 / COVID-19 MRNA VACCINE (PFIZER-BIONTECH) 30 MCG: CPT

## 2021-04-09 ENCOUNTER — IMMUNIZATIONS (OUTPATIENT)
Dept: FAMILY MEDICINE CLINIC | Facility: HOSPITAL | Age: 76
End: 2021-04-09

## 2021-04-09 DIAGNOSIS — Z23 ENCOUNTER FOR IMMUNIZATION: Primary | ICD-10-CM

## 2021-04-09 PROCEDURE — 91300 SARS-COV-2 / COVID-19 MRNA VACCINE (PFIZER-BIONTECH) 30 MCG: CPT

## 2021-04-09 PROCEDURE — 0002A SARS-COV-2 / COVID-19 MRNA VACCINE (PFIZER-BIONTECH) 30 MCG: CPT

## 2021-04-19 DIAGNOSIS — F41.9 CHRONIC ANXIETY: ICD-10-CM

## 2021-04-19 RX ORDER — ALPRAZOLAM 0.25 MG/1
0.25 TABLET ORAL 2 TIMES DAILY PRN
Qty: 60 TABLET | Refills: 1 | Status: ON HOLD | OUTPATIENT
Start: 2021-04-19 | End: 2021-07-14 | Stop reason: SDUPTHER

## 2021-05-06 DIAGNOSIS — I10 HYPERTENSION, ESSENTIAL: ICD-10-CM

## 2021-05-06 RX ORDER — METOPROLOL TARTRATE 50 MG/1
50 TABLET, FILM COATED ORAL 2 TIMES DAILY
Qty: 180 TABLET | Refills: 1 | Status: SHIPPED | OUTPATIENT
Start: 2021-05-06 | End: 2022-04-05 | Stop reason: SDUPTHER

## 2021-05-27 ENCOUNTER — OFFICE VISIT (OUTPATIENT)
Dept: NEUROLOGY | Facility: CLINIC | Age: 76
End: 2021-05-27
Payer: MEDICARE

## 2021-05-27 VITALS — SYSTOLIC BLOOD PRESSURE: 126 MMHG | HEART RATE: 67 BPM | DIASTOLIC BLOOD PRESSURE: 58 MMHG

## 2021-05-27 DIAGNOSIS — G20 PARKINSON DISEASE (HCC): Primary | ICD-10-CM

## 2021-05-27 PROCEDURE — 99214 OFFICE O/P EST MOD 30 MIN: CPT | Performed by: PHYSICIAN ASSISTANT

## 2021-05-27 NOTE — PROGRESS NOTES
Patient ID: Chidi Frausto is a 64 y o  female  Assessment/Plan:    Parkinson disease ([facility] )  Patient continues to have slowness, stiffness, intermittent resting tremor and gait difficulties on exam consistent with her diagnosis of Parkinson's disease  She unfortunately had some side effects when initially started on Sinemet however we had a long discussion in regards to the role of this medication and I feel she would certainly benefit from the retrial   We will try and go very slowly with the increase this time around to give her body more time to adjust to the medication  Will have her start by taking Sinemet  mg   1/2 pill in the morning for 1 week,   then 1/2 pill in the morning and 1/2 pill in the afternoon for 1 week,   then 1/2 in the morning 1/2 pill in the afternoon and 1/2 in the evening for 1 week,   then 1 pill in the morning 1/2 pill in the afternoon and 1/2 pill in the evening for 1 week,   then 1 pill in the morning 1 pill in the afternoon 1/2 pill in the evening for 1 week   then 1 pill 3 times a day  She will continue with her current dose of Azilect  She was also encouraged to try and get more exercise and activity  She is limited with her back pain however her and her  will start walking around the local park  We also did discuss the option of more formal physical therapy especially with the big therapy program   She would like to hold off for now but may consider this in the future  She is no longer driving  Subjective:    Ms Akiko Cortes is a right handed woman who presents for Movement follow up for Parkinson's disease  She was previous followed by Dr Socorro Morrison  Review of his notes and chart reveal symptoms began with right arm tremor in 2016  Frist seen on Azilect with asymmetric rigidity R>L, rest tremor bradykinesia with decrement  Last seen September 2020 with her daughter having raised some concerns regarding her cognition and her driving ability   Ms  Fatmata Hayden was to hold off on driving until completing a fitness to drive evaluation  Aricept was started  She called stating Aricept caused increase tremor and she discontinued it  At her last visit she was having more issues with mobility and she was started on Sinemet  [facility] 15/  She was told to stop driving or have the FTD evaluation performed  INTERVAL HISTORY:   called with concerns for side effects with the Sinemet (nausea, vomiting and sweating)  She stopped taking it  She is having issues with balance and walking  She continues to have tremors in the hands and at times the left leg  She feels that she is overall doing well  She resides with her   She does have issues with low back pain for years and this is ongoing for her  She is not very active right now because of the pain  She does not keep up with any exercises that were given to her at PT  She will have some tremors at times however it is not continuous  Her  helps with pulling up her pants and socks, this is more because of the back pain  No issues with swallowing  She sleeps well at night  No hallucinations  She is no longer driving, her license  and she is not going to have it renewed  I personally reviewed and updated the ROS  Objective:    Blood pressure 126/58, pulse 67  Physical Exam  HENT:      Right Ear: Hearing normal       Left Ear: Hearing normal    Eyes:      General: Lids are normal       Extraocular Movements: Extraocular movements intact  Pupils: Pupils are equal, round, and reactive to light  Pulmonary:      Effort: Pulmonary effort is normal    Neurological:      Mental Status: She is alert  Deep Tendon Reflexes: Strength normal          Neurological Exam  Mental Status  Alert  Oriented only to person  Speech: hypophonia  Language is fluent with no aphasia  MOCA 15/30  Cranial Nerves  CN III, IV, VI: Extraocular movements intact bilaterally   Normal lids and orbits bilaterally  Pupils equal round and reactive to light bilaterally  CN V:  Right: Facial sensation is normal   Left: Facial sensation is normal on the left  CN VIII:  Right: Hearing is normal   Left: Hearing is normal   CN XI: Shoulder shrug strength is normal     Motor  Normal muscle bulk throughout  Normal muscle tone  Strength is 5/5 throughout all four extremities  Sensory  Light touch is normal in upper and lower extremities  Reflexes  Glabellar tap present  Coordination  Right: Finger-to-nose normal  Rapid alternating movement abnormality:  Left: Finger-to-nose normal  Rapid alternating movement abnormality:  See MDS UPDRS III  Gait  Casual gait: Abnormal pull test  Would fall if not caught  Unable to rise from chair without using arms  Arose on second attempt with difficulty     Decreased stride and arm swing  Stooped posture           UPDRS motor:                              Time since last dose:   5/27/21 1/13/21   Speech  1  1   Facial Expression       Rigidity - Neck  1  2   Rigidity - Upper Extremity (Right)  2  2   Rigidity - Upper Extremity (Left)   1  1   Rigidity - Lower Extremity (Right)  1  1   Rigidity - Lower Extremity (Left)   0  0   Finger Taps (Right)   3  3   Finger Taps (Left)   2  3   Hand Movement (Right)  3  2   Hand Movement (Left)   2  1   Pronation/Supination (Right)  2  2   Pronation/Supination (Left)   2  2   Toe Tapping (Right) 2  2   Toe Tapping (Left) 2  2   Leg Agility (Right)  2  2   Leg Agility (Left)   2  2   Arising from Chair   2  1   Gait   2  2   Freezing of Gait 0  0   Postural Stability      2   Posture 2  2   Global spontaneity of movement 2  2   Postural Tremor (Right) 0  0   Postural Tremor (Left) 0  0   Kinetic Tremor (Right)  0  0   Kinetic Tremor (Left)  0  0   Rest tremor amplitude RUE 2  2   Rest tremor amplitude LUE 1  1   Rest tremor amplitude RLE 1  1   Reset tremor amplitude LLE 0  0   Lip/Jaw Tremor  0  0   Consistency of tremor 1  1   Motor Exam Total:              ROS:    Review of Systems   Constitutional: Negative  Negative for appetite change and fever  HENT: Negative  Negative for hearing loss, tinnitus, trouble swallowing and voice change  Eyes: Negative  Negative for photophobia and pain  Respiratory: Negative  Negative for shortness of breath  Cardiovascular: Negative  Negative for palpitations  Gastrointestinal: Negative  Negative for nausea and vomiting  Endocrine: Negative  Negative for cold intolerance  Genitourinary: Negative  Negative for dysuria, frequency and urgency  Musculoskeletal: Positive for back pain and gait problem  Negative for myalgias and neck pain  Balance issues     Skin: Negative  Negative for rash  Allergic/Immunologic: Negative  Neurological: Positive for tremors (Hands and once in a while left foot) and weakness (Legs after walking for a distance)  Negative for dizziness, seizures, syncope, facial asymmetry, speech difficulty, light-headedness, numbness and headaches  Hematological: Negative  Does not bruise/bleed easily  Psychiatric/Behavioral: Negative  Negative for confusion, hallucinations and sleep disturbance  All other systems reviewed and are negative

## 2021-05-27 NOTE — PATIENT INSTRUCTIONS
Patient continues to have slowness, stiffness, intermittent resting tremor and gait difficulties on exam consistent with her diagnosis of Parkinson's disease  She unfortunately had some side effects when initially started on Sinemet however we had a long discussion in regards to the role of this medication and I feel she would certainly benefit from the retrial   We will try and go very slowly with the increase this time around to give her body more time to adjust to the medication  Will have her start by taking Sinemet  mg   1/2 pill in the morning for 1 week,   then 1/2 pill in the morning and 1/2 pill in the afternoon for 1 week,   then 1/2 in the morning 1/2 pill in the afternoon and 1/2 in the evening for 1 week,   then 1 pill in the morning 1/2 pill in the afternoon and 1/2 pill in the evening for 1 week,   then 1 pill in the morning 1 pill in the afternoon 1/2 pill in the evening for 1 week   then 1 pill 3 times a day  She will continue with her current dose of Azilect  She was also encouraged to try and get more exercise and activity  She is limited with her back pain however her and her  will start walking around the local park  We also did discuss the option of more formal physical therapy especially with the big therapy program   She would like to hold off for now but may consider this in the future  She is no longer driving

## 2021-07-11 ENCOUNTER — APPOINTMENT (INPATIENT)
Dept: RADIOLOGY | Facility: HOSPITAL | Age: 76
DRG: 522 | End: 2021-07-11
Payer: MEDICARE

## 2021-07-11 ENCOUNTER — APPOINTMENT (EMERGENCY)
Dept: CT IMAGING | Facility: HOSPITAL | Age: 76
DRG: 522 | End: 2021-07-11
Payer: MEDICARE

## 2021-07-11 ENCOUNTER — ANESTHESIA (INPATIENT)
Dept: PERIOP | Facility: HOSPITAL | Age: 76
DRG: 522 | End: 2021-07-11
Payer: MEDICARE

## 2021-07-11 ENCOUNTER — ANESTHESIA EVENT (INPATIENT)
Dept: PERIOP | Facility: HOSPITAL | Age: 76
DRG: 522 | End: 2021-07-11
Payer: MEDICARE

## 2021-07-11 ENCOUNTER — APPOINTMENT (EMERGENCY)
Dept: RADIOLOGY | Facility: HOSPITAL | Age: 76
DRG: 522 | End: 2021-07-11
Payer: MEDICARE

## 2021-07-11 ENCOUNTER — HOSPITAL ENCOUNTER (INPATIENT)
Facility: HOSPITAL | Age: 76
LOS: 3 days | Discharge: NURSING, REHAB OR LONG-TERM CARE | DRG: 522 | End: 2021-07-14
Attending: EMERGENCY MEDICINE | Admitting: INTERNAL MEDICINE
Payer: MEDICARE

## 2021-07-11 DIAGNOSIS — I10 ESSENTIAL HYPERTENSION: ICD-10-CM

## 2021-07-11 DIAGNOSIS — S51.811A SKIN TEAR OF RIGHT FOREARM WITHOUT COMPLICATION: ICD-10-CM

## 2021-07-11 DIAGNOSIS — S72.002A CLOSED LEFT HIP FRACTURE (HCC): ICD-10-CM

## 2021-07-11 DIAGNOSIS — S09.90XA HEAD INJURY: ICD-10-CM

## 2021-07-11 DIAGNOSIS — S72.012A CLOSED SUBCAPITAL FRACTURE OF LEFT FEMUR, INITIAL ENCOUNTER (HCC): Primary | ICD-10-CM

## 2021-07-11 DIAGNOSIS — K59.09 CHRONIC CONSTIPATION: ICD-10-CM

## 2021-07-11 DIAGNOSIS — F41.9 CHRONIC ANXIETY: ICD-10-CM
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
## 2021-07-11 DIAGNOSIS — S72.002A CLOSED DISPLACED FRACTURE OF LEFT FEMORAL NECK (HCC): ICD-10-CM

## 2021-07-11 PROBLEM — I16.0 HYPERTENSIVE URGENCY: Status: ACTIVE | Noted: 2021-07-11

## 2021-07-11 LAB
ABO GROUP BLD: NORMAL
ABO GROUP BLD: NORMAL
ALBUMIN SERPL BCP-MCNC: 4.1 G/DL (ref 3.5–5)
ALP SERPL-CCNC: 105 U/L (ref 46–116)
ALT SERPL W P-5'-P-CCNC: 22 U/L (ref 12–78)
ANION GAP SERPL CALCULATED.3IONS-SCNC: 8 MMOL/L (ref 4–13)
APTT PPP: 21 SECONDS (ref 23–37)
AST SERPL W P-5'-P-CCNC: 23 U/L (ref 5–45)
ATRIAL RATE: 83 BPM
BASOPHILS # BLD AUTO: 0.01 THOUSANDS/ΜL (ref 0–0.1)
BASOPHILS NFR BLD AUTO: 0 % (ref 0–1)
BILIRUB SERPL-MCNC: 0.8 MG/DL (ref 0.2–1)
BLD GP AB SCN SERPL QL: POSITIVE
BLOOD GROUP ANTIBODIES SERPL: NORMAL
BUN SERPL-MCNC: 13 MG/DL (ref 5–25)
C AG RBC QL: NEGATIVE
CALCIUM SERPL-MCNC: 8.9 MG/DL (ref 8.3–10.1)
CHLORIDE SERPL-SCNC: 100 MMOL/L (ref 100–108)
CO2 SERPL-SCNC: 31 MMOL/L (ref 21–32)
CREAT SERPL-MCNC: 1.17 MG/DL (ref 0.6–1.3)
EOSINOPHIL # BLD AUTO: 0.04 THOUSAND/ΜL (ref 0–0.61)
EOSINOPHIL NFR BLD AUTO: 1 % (ref 0–6)
ERYTHROCYTE [DISTWIDTH] IN BLOOD BY AUTOMATED COUNT: 17.3 % (ref 11.6–15.1)
FERRITIN SERPL-MCNC: 24 NG/ML (ref 8–388)
GFR SERPL CREATININE-BSD FRML MDRD: 45 ML/MIN/1.73SQ M
GLUCOSE SERPL-MCNC: 99 MG/DL (ref 65–140)
HCT VFR BLD AUTO: 30.3 % (ref 34.8–46.1)
HGB BLD-MCNC: 9.3 G/DL (ref 11.5–15.4)
IMM GRANULOCYTES # BLD AUTO: 0.06 THOUSAND/UL (ref 0–0.2)
IMM GRANULOCYTES NFR BLD AUTO: 1 % (ref 0–2)
INR PPP: 1.01 (ref 0.84–1.19)
IRON SATN MFR SERPL: 14 %
IRON SERPL-MCNC: 61 UG/DL (ref 50–170)
LYMPHOCYTES # BLD AUTO: 1.02 THOUSANDS/ΜL (ref 0.6–4.47)
LYMPHOCYTES NFR BLD AUTO: 14 % (ref 14–44)
MCH RBC QN AUTO: 21.3 PG (ref 26.8–34.3)
MCHC RBC AUTO-ENTMCNC: 30.7 G/DL (ref 31.4–37.4)
MCV RBC AUTO: 70 FL (ref 82–98)
MONOCYTES # BLD AUTO: 0.38 THOUSAND/ΜL (ref 0.17–1.22)
MONOCYTES NFR BLD AUTO: 5 % (ref 4–12)
NEUTROPHILS # BLD AUTO: 5.59 THOUSANDS/ΜL (ref 1.85–7.62)
NEUTS SEG NFR BLD AUTO: 79 % (ref 43–75)
NRBC BLD AUTO-RTO: 0 /100 WBCS
P AXIS: 55 DEGREES
PLATELET # BLD AUTO: 181 THOUSANDS/UL (ref 149–390)
PMV BLD AUTO: 10.4 FL (ref 8.9–12.7)
POTASSIUM SERPL-SCNC: 3.8 MMOL/L (ref 3.5–5.3)
PR INTERVAL: 192 MS
PROT SERPL-MCNC: 7.2 G/DL (ref 6.4–8.2)
PROTHROMBIN TIME: 13.3 SECONDS (ref 11.6–14.5)
QRS AXIS: -32 DEGREES
QRSD INTERVAL: 78 MS
QT INTERVAL: 372 MS
QTC INTERVAL: 437 MS
RBC # BLD AUTO: 4.36 MILLION/UL (ref 3.81–5.12)
RH BLD: NEGATIVE
RH BLD: NEGATIVE
SODIUM SERPL-SCNC: 139 MMOL/L (ref 136–145)
SPECIMEN EXPIRATION DATE: NORMAL
T WAVE AXIS: 31 DEGREES
TIBC SERPL-MCNC: 425 UG/DL (ref 250–450)
TROPONIN I SERPL-MCNC: <0.02 NG/ML
VENTRICULAR RATE: 83 BPM
WBC # BLD AUTO: 7.1 THOUSAND/UL (ref 4.31–10.16)

## 2021-07-11 PROCEDURE — 73502 X-RAY EXAM HIP UNI 2-3 VIEWS: CPT

## 2021-07-11 PROCEDURE — 84484 ASSAY OF TROPONIN QUANT: CPT | Performed by: PHYSICIAN ASSISTANT

## 2021-07-11 PROCEDURE — 1123F ACP DISCUSS/DSCN MKR DOCD: CPT | Performed by: PHYSICIAN ASSISTANT

## 2021-07-11 PROCEDURE — 93005 ELECTROCARDIOGRAM TRACING: CPT

## 2021-07-11 PROCEDURE — 86905 BLOOD TYPING RBC ANTIGENS: CPT

## 2021-07-11 PROCEDURE — 73564 X-RAY EXAM KNEE 4 OR MORE: CPT

## 2021-07-11 PROCEDURE — 80053 COMPREHEN METABOLIC PANEL: CPT | Performed by: PHYSICIAN ASSISTANT

## 2021-07-11 PROCEDURE — 72170 X-RAY EXAM OF PELVIS: CPT

## 2021-07-11 PROCEDURE — C1776 JOINT DEVICE (IMPLANTABLE): HCPCS | Performed by: ORTHOPAEDIC SURGERY

## 2021-07-11 PROCEDURE — 86850 RBC ANTIBODY SCREEN: CPT | Performed by: STUDENT IN AN ORGANIZED HEALTH CARE EDUCATION/TRAINING PROGRAM

## 2021-07-11 PROCEDURE — 83550 IRON BINDING TEST: CPT | Performed by: INTERNAL MEDICINE

## 2021-07-11 PROCEDURE — 99285 EMERGENCY DEPT VISIT HI MDM: CPT

## 2021-07-11 PROCEDURE — 85730 THROMBOPLASTIN TIME PARTIAL: CPT | Performed by: PHYSICIAN ASSISTANT

## 2021-07-11 PROCEDURE — G9197 ORDER FOR CEPH: HCPCS | Performed by: ORTHOPAEDIC SURGERY

## 2021-07-11 PROCEDURE — 86921 COMPATIBILITY TEST INCUBATE: CPT

## 2021-07-11 PROCEDURE — 71045 X-RAY EXAM CHEST 1 VIEW: CPT

## 2021-07-11 PROCEDURE — 70450 CT HEAD/BRAIN W/O DYE: CPT

## 2021-07-11 PROCEDURE — 83540 ASSAY OF IRON: CPT | Performed by: INTERNAL MEDICINE

## 2021-07-11 PROCEDURE — 99222 1ST HOSP IP/OBS MODERATE 55: CPT | Performed by: INTERNAL MEDICINE

## 2021-07-11 PROCEDURE — 99285 EMERGENCY DEPT VISIT HI MDM: CPT | Performed by: PHYSICIAN ASSISTANT

## 2021-07-11 PROCEDURE — 85025 COMPLETE CBC W/AUTO DIFF WBC: CPT | Performed by: PHYSICIAN ASSISTANT

## 2021-07-11 PROCEDURE — 96361 HYDRATE IV INFUSION ADD-ON: CPT

## 2021-07-11 PROCEDURE — 93010 ELECTROCARDIOGRAM REPORT: CPT | Performed by: INTERNAL MEDICINE

## 2021-07-11 PROCEDURE — NC001 PR NO CHARGE: Performed by: ORTHOPAEDIC SURGERY

## 2021-07-11 PROCEDURE — 82728 ASSAY OF FERRITIN: CPT | Performed by: INTERNAL MEDICINE

## 2021-07-11 PROCEDURE — 36415 COLL VENOUS BLD VENIPUNCTURE: CPT | Performed by: PHYSICIAN ASSISTANT

## 2021-07-11 PROCEDURE — 86901 BLOOD TYPING SEROLOGIC RH(D): CPT | Performed by: STUDENT IN AN ORGANIZED HEALTH CARE EDUCATION/TRAINING PROGRAM

## 2021-07-11 PROCEDURE — 27236 TREAT THIGH FRACTURE: CPT | Performed by: PHYSICIAN ASSISTANT

## 2021-07-11 PROCEDURE — 0SRS0JZ REPLACEMENT OF LEFT HIP JOINT, FEMORAL SURFACE WITH SYNTHETIC SUBSTITUTE, OPEN APPROACH: ICD-10-PCS | Performed by: ORTHOPAEDIC SURGERY

## 2021-07-11 PROCEDURE — 30233N1 TRANSFUSION OF NONAUTOLOGOUS RED BLOOD CELLS INTO PERIPHERAL VEIN, PERCUTANEOUS APPROACH: ICD-10-PCS | Performed by: INTERNAL MEDICINE

## 2021-07-11 PROCEDURE — 96374 THER/PROPH/DIAG INJ IV PUSH: CPT

## 2021-07-11 PROCEDURE — 99223 1ST HOSP IP/OBS HIGH 75: CPT | Performed by: ORTHOPAEDIC SURGERY

## 2021-07-11 PROCEDURE — 85610 PROTHROMBIN TIME: CPT | Performed by: PHYSICIAN ASSISTANT

## 2021-07-11 PROCEDURE — 86870 RBC ANTIBODY IDENTIFICATION: CPT | Performed by: INTERNAL MEDICINE

## 2021-07-11 PROCEDURE — 27236 TREAT THIGH FRACTURE: CPT | Performed by: ORTHOPAEDIC SURGERY

## 2021-07-11 PROCEDURE — 86922 COMPATIBILITY TEST ANTIGLOB: CPT

## 2021-07-11 PROCEDURE — 86900 BLOOD TYPING SEROLOGIC ABO: CPT | Performed by: STUDENT IN AN ORGANIZED HEALTH CARE EDUCATION/TRAINING PROGRAM

## 2021-07-11 DEVICE — SELF CENTERING BI-POLAR HEAD 28MM ID 45MM OD
Type: IMPLANTABLE DEVICE | Site: HIP | Status: FUNCTIONAL
Brand: SELF CENTERING

## 2021-07-11 DEVICE — TRI-LOCK BPS FEMORAL STEM 12/14 TAPER TRI-LOCK BPS W/GRIPTION SIZE 1 HI 97MM
Type: IMPLANTABLE DEVICE | Site: HIP | Status: FUNCTIONAL
Brand: TRI-LOCK GRIPTION

## 2021-07-11 DEVICE — ARTICUL/EZE FEMORAL HEAD DIAMETER 28MM +8.5 12/14 TAPER
Type: IMPLANTABLE DEVICE | Site: HIP | Status: FUNCTIONAL
Brand: ARTICUL/EZE

## 2021-07-11 RX ORDER — SENNOSIDES 8.6 MG
1 TABLET ORAL DAILY
Status: DISCONTINUED | OUTPATIENT
Start: 2021-07-12 | End: 2021-07-14 | Stop reason: HOSPADM

## 2021-07-11 RX ORDER — FENTANYL CITRATE/PF 50 MCG/ML
25 SYRINGE (ML) INJECTION
Status: DISCONTINUED | OUTPATIENT
Start: 2021-07-11 | End: 2021-07-11 | Stop reason: HOSPADM

## 2021-07-11 RX ORDER — LIDOCAINE HYDROCHLORIDE 10 MG/ML
INJECTION, SOLUTION EPIDURAL; INFILTRATION; INTRACAUDAL; PERINEURAL AS NEEDED
Status: DISCONTINUED | OUTPATIENT
Start: 2021-07-11 | End: 2021-07-11

## 2021-07-11 RX ORDER — SODIUM CHLORIDE 9 MG/ML
125 INJECTION, SOLUTION INTRAVENOUS CONTINUOUS
Status: DISCONTINUED | OUTPATIENT
Start: 2021-07-11 | End: 2021-07-12

## 2021-07-11 RX ORDER — DEXAMETHASONE SODIUM PHOSPHATE 4 MG/ML
INJECTION, SOLUTION INTRA-ARTICULAR; INTRALESIONAL; INTRAMUSCULAR; INTRAVENOUS; SOFT TISSUE AS NEEDED
Status: DISCONTINUED | OUTPATIENT
Start: 2021-07-11 | End: 2021-07-11

## 2021-07-11 RX ORDER — CEFAZOLIN SODIUM 2 G/50ML
SOLUTION INTRAVENOUS AS NEEDED
Status: DISCONTINUED | OUTPATIENT
Start: 2021-07-11 | End: 2021-07-11

## 2021-07-11 RX ORDER — HYDROMORPHONE HCL IN WATER/PF 6 MG/30 ML
0.2 PATIENT CONTROLLED ANALGESIA SYRINGE INTRAVENOUS
Status: DISCONTINUED | OUTPATIENT
Start: 2021-07-11 | End: 2021-07-11 | Stop reason: HOSPADM

## 2021-07-11 RX ORDER — FENTANYL CITRATE 50 UG/ML
50 INJECTION, SOLUTION INTRAMUSCULAR; INTRAVENOUS ONCE
Status: COMPLETED | OUTPATIENT
Start: 2021-07-11 | End: 2021-07-11

## 2021-07-11 RX ORDER — AMLODIPINE BESYLATE 5 MG/1
5 TABLET ORAL ONCE
Status: COMPLETED | OUTPATIENT
Start: 2021-07-11 | End: 2021-07-11

## 2021-07-11 RX ORDER — LIDOCAINE 50 MG/G
1 PATCH TOPICAL DAILY
Status: COMPLETED | OUTPATIENT
Start: 2021-07-12 | End: 2021-07-12

## 2021-07-11 RX ORDER — PROPOFOL 10 MG/ML
INJECTION, EMULSION INTRAVENOUS AS NEEDED
Status: DISCONTINUED | OUTPATIENT
Start: 2021-07-11 | End: 2021-07-11

## 2021-07-11 RX ORDER — LABETALOL 20 MG/4 ML (5 MG/ML) INTRAVENOUS SYRINGE
5
Status: DISCONTINUED | OUTPATIENT
Start: 2021-07-11 | End: 2021-07-11 | Stop reason: HOSPADM

## 2021-07-11 RX ORDER — MAGNESIUM HYDROXIDE 1200 MG/15ML
LIQUID ORAL AS NEEDED
Status: DISCONTINUED | OUTPATIENT
Start: 2021-07-11 | End: 2021-07-11 | Stop reason: HOSPADM

## 2021-07-11 RX ORDER — OXYCODONE HYDROCHLORIDE 5 MG/1
5 TABLET ORAL EVERY 4 HOURS PRN
Status: DISCONTINUED | OUTPATIENT
Start: 2021-07-11 | End: 2021-07-14 | Stop reason: HOSPADM

## 2021-07-11 RX ORDER — ALPRAZOLAM 0.25 MG/1
0.25 TABLET ORAL ONCE
Status: COMPLETED | OUTPATIENT
Start: 2021-07-11 | End: 2021-07-11

## 2021-07-11 RX ORDER — METOPROLOL TARTRATE 50 MG/1
50 TABLET, FILM COATED ORAL ONCE
Status: COMPLETED | OUTPATIENT
Start: 2021-07-11 | End: 2021-07-11

## 2021-07-11 RX ORDER — ACETAMINOPHEN 325 MG/1
975 TABLET ORAL EVERY 8 HOURS SCHEDULED
Status: DISCONTINUED | OUTPATIENT
Start: 2021-07-11 | End: 2021-07-14 | Stop reason: HOSPADM

## 2021-07-11 RX ORDER — ONDANSETRON 2 MG/ML
4 INJECTION INTRAMUSCULAR; INTRAVENOUS ONCE AS NEEDED
Status: DISCONTINUED | OUTPATIENT
Start: 2021-07-11 | End: 2021-07-11 | Stop reason: HOSPADM

## 2021-07-11 RX ORDER — HYDRALAZINE HYDROCHLORIDE 20 MG/ML
10 INJECTION INTRAMUSCULAR; INTRAVENOUS EVERY 6 HOURS PRN
Status: DISCONTINUED | OUTPATIENT
Start: 2021-07-11 | End: 2021-07-14 | Stop reason: HOSPADM

## 2021-07-11 RX ORDER — SELEGILINE HYDROCHLORIDE 5 MG/1
5 CAPSULE ORAL
Status: DISCONTINUED | OUTPATIENT
Start: 2021-07-12 | End: 2021-07-11

## 2021-07-11 RX ORDER — ONDANSETRON 2 MG/ML
INJECTION INTRAMUSCULAR; INTRAVENOUS AS NEEDED
Status: DISCONTINUED | OUTPATIENT
Start: 2021-07-11 | End: 2021-07-11

## 2021-07-11 RX ORDER — ALBUMIN, HUMAN INJ 5% 5 %
SOLUTION INTRAVENOUS CONTINUOUS PRN
Status: DISCONTINUED | OUTPATIENT
Start: 2021-07-11 | End: 2021-07-11

## 2021-07-11 RX ORDER — RASAGILINE 1 MG/1
1 TABLET ORAL DAILY
Status: DISCONTINUED | OUTPATIENT
Start: 2021-07-11 | End: 2021-07-14 | Stop reason: HOSPADM

## 2021-07-11 RX ORDER — PRAVASTATIN SODIUM 40 MG
40 TABLET ORAL DAILY
Status: DISCONTINUED | OUTPATIENT
Start: 2021-07-11 | End: 2021-07-14 | Stop reason: HOSPADM

## 2021-07-11 RX ORDER — ROCURONIUM BROMIDE 10 MG/ML
INJECTION, SOLUTION INTRAVENOUS AS NEEDED
Status: DISCONTINUED | OUTPATIENT
Start: 2021-07-11 | End: 2021-07-11

## 2021-07-11 RX ORDER — TIMOLOL MALEATE 5 MG/ML
1 SOLUTION/ DROPS OPHTHALMIC 2 TIMES DAILY
Status: DISCONTINUED | OUTPATIENT
Start: 2021-07-11 | End: 2021-07-14 | Stop reason: HOSPADM

## 2021-07-11 RX ORDER — OXYCODONE HYDROCHLORIDE 5 MG/1
2.5 TABLET ORAL EVERY 4 HOURS PRN
Status: DISCONTINUED | OUTPATIENT
Start: 2021-07-11 | End: 2021-07-14 | Stop reason: HOSPADM

## 2021-07-11 RX ORDER — ALPRAZOLAM 0.25 MG/1
0.25 TABLET ORAL 2 TIMES DAILY PRN
Status: DISCONTINUED | OUTPATIENT
Start: 2021-07-11 | End: 2021-07-14 | Stop reason: HOSPADM

## 2021-07-11 RX ORDER — DOCUSATE SODIUM 100 MG/1
100 CAPSULE, LIQUID FILLED ORAL 2 TIMES DAILY
Status: DISCONTINUED | OUTPATIENT
Start: 2021-07-11 | End: 2021-07-14 | Stop reason: HOSPADM

## 2021-07-11 RX ORDER — AMLODIPINE BESYLATE 5 MG/1
5 TABLET ORAL DAILY
Status: DISCONTINUED | OUTPATIENT
Start: 2021-07-11 | End: 2021-07-11

## 2021-07-11 RX ORDER — METOPROLOL TARTRATE 50 MG/1
50 TABLET, FILM COATED ORAL 2 TIMES DAILY
Status: DISCONTINUED | OUTPATIENT
Start: 2021-07-11 | End: 2021-07-14 | Stop reason: HOSPADM

## 2021-07-11 RX ORDER — LATANOPROST 50 UG/ML
1 SOLUTION/ DROPS OPHTHALMIC
Status: DISCONTINUED | OUTPATIENT
Start: 2021-07-11 | End: 2021-07-14 | Stop reason: HOSPADM

## 2021-07-11 RX ORDER — HYDROMORPHONE HCL/PF 1 MG/ML
0.2 SYRINGE (ML) INJECTION EVERY 4 HOURS PRN
Status: DISCONTINUED | OUTPATIENT
Start: 2021-07-11 | End: 2021-07-14 | Stop reason: HOSPADM

## 2021-07-11 RX ORDER — PANTOPRAZOLE SODIUM 20 MG/1
20 TABLET, DELAYED RELEASE ORAL
Status: DISCONTINUED | OUTPATIENT
Start: 2021-07-12 | End: 2021-07-14 | Stop reason: HOSPADM

## 2021-07-11 RX ORDER — FENTANYL CITRATE 50 UG/ML
INJECTION, SOLUTION INTRAMUSCULAR; INTRAVENOUS AS NEEDED
Status: DISCONTINUED | OUTPATIENT
Start: 2021-07-11 | End: 2021-07-11

## 2021-07-11 RX ADMIN — FENTANYL CITRATE 25 MCG: 50 INJECTION, SOLUTION INTRAMUSCULAR; INTRAVENOUS at 15:47

## 2021-07-11 RX ADMIN — PROPOFOL 50 MG: 10 INJECTION, EMULSION INTRAVENOUS at 15:11

## 2021-07-11 RX ADMIN — ACETAMINOPHEN 975 MG: 325 TABLET, FILM COATED ORAL at 18:22

## 2021-07-11 RX ADMIN — TIMOLOL MALEATE 1 DROP: 5 SOLUTION/ DROPS OPHTHALMIC at 18:23

## 2021-07-11 RX ADMIN — ALBUMIN (HUMAN): 12.5 INJECTION, SOLUTION INTRAVENOUS at 16:13

## 2021-07-11 RX ADMIN — FENTANYL CITRATE 25 MCG: 50 INJECTION, SOLUTION INTRAMUSCULAR; INTRAVENOUS at 17:06

## 2021-07-11 RX ADMIN — PROPOFOL 20 MG: 10 INJECTION, EMULSION INTRAVENOUS at 15:12

## 2021-07-11 RX ADMIN — ENOXAPARIN SODIUM 30 MG: 100 INJECTION SUBCUTANEOUS at 21:52

## 2021-07-11 RX ADMIN — PHENYLEPHRINE HYDROCHLORIDE 25 MCG/MIN: 10 INJECTION INTRAVENOUS at 15:15

## 2021-07-11 RX ADMIN — SUGAMMADEX 200 MG: 100 INJECTION, SOLUTION INTRAVENOUS at 16:14

## 2021-07-11 RX ADMIN — FENTANYL CITRATE 25 MCG: 50 INJECTION, SOLUTION INTRAMUSCULAR; INTRAVENOUS at 15:10

## 2021-07-11 RX ADMIN — ACETAMINOPHEN 975 MG: 325 TABLET, FILM COATED ORAL at 21:52

## 2021-07-11 RX ADMIN — FENTANYL CITRATE 25 MCG: 50 INJECTION, SOLUTION INTRAMUSCULAR; INTRAVENOUS at 17:00

## 2021-07-11 RX ADMIN — ONDANSETRON 4 MG: 2 INJECTION INTRAMUSCULAR; INTRAVENOUS at 16:02

## 2021-07-11 RX ADMIN — CEFAZOLIN SODIUM 2000 MG: 2 SOLUTION INTRAVENOUS at 15:02

## 2021-07-11 RX ADMIN — ALPRAZOLAM 0.25 MG: 0.25 TABLET ORAL at 10:14

## 2021-07-11 RX ADMIN — FENTANYL CITRATE 50 MCG: 50 INJECTION, SOLUTION INTRAMUSCULAR; INTRAVENOUS at 09:18

## 2021-07-11 RX ADMIN — DEXAMETHASONE SODIUM PHOSPHATE 4 MG: 4 INJECTION, SOLUTION INTRAMUSCULAR; INTRAVENOUS at 15:28

## 2021-07-11 RX ADMIN — ROCURONIUM BROMIDE 50 MG: 10 INJECTION, SOLUTION INTRAVENOUS at 15:11

## 2021-07-11 RX ADMIN — FENTANYL CITRATE 25 MCG: 50 INJECTION, SOLUTION INTRAMUSCULAR; INTRAVENOUS at 15:30

## 2021-07-11 RX ADMIN — PROPOFOL 20 MG: 10 INJECTION, EMULSION INTRAVENOUS at 16:32

## 2021-07-11 RX ADMIN — LATANOPROST 1 DROP: 50 SOLUTION OPHTHALMIC at 21:53

## 2021-07-11 RX ADMIN — SODIUM CHLORIDE 125 ML/HR: 0.9 INJECTION, SOLUTION INTRAVENOUS at 17:54

## 2021-07-11 RX ADMIN — METOPROLOL TARTRATE 50 MG: 50 TABLET, FILM COATED ORAL at 18:22

## 2021-07-11 RX ADMIN — OXYCODONE HYDROCHLORIDE 5 MG: 5 TABLET ORAL at 18:22

## 2021-07-11 RX ADMIN — SODIUM CHLORIDE 125 ML/HR: 0.9 INJECTION, SOLUTION INTRAVENOUS at 10:05

## 2021-07-11 RX ADMIN — AMLODIPINE BESYLATE 5 MG: 5 TABLET ORAL at 12:16

## 2021-07-11 RX ADMIN — HYDRALAZINE HYDROCHLORIDE 10 MG: 20 INJECTION INTRAMUSCULAR; INTRAVENOUS at 12:16

## 2021-07-11 RX ADMIN — METOPROLOL TARTRATE 50 MG: 50 TABLET, FILM COATED ORAL at 10:14

## 2021-07-11 RX ADMIN — FENTANYL CITRATE 25 MCG: 50 INJECTION, SOLUTION INTRAMUSCULAR; INTRAVENOUS at 15:37

## 2021-07-11 RX ADMIN — LIDOCAINE HYDROCHLORIDE 50 MG: 10 INJECTION, SOLUTION EPIDURAL; INFILTRATION; INTRACAUDAL; PERINEURAL at 15:10

## 2021-07-11 NOTE — ASSESSMENT & PLAN NOTE
Noted   According to , a cane was recommended but patient ambulating without   Needs PT/OT post surgery

## 2021-07-11 NOTE — H&P
Zuhair Villela  H&P- Alfred Aus 1945, 64 y o  female MRN: [id number]  Unit/Bed#: ED 07 Encounter: [id number]  Primary Care Provider: Ruben Rader DO   Date and time admitted to hospital: 7/11/2021  8:53 AM    * Closed displaced fracture of left femoral neck (HCC)  Assessment & Plan  Due to a mechanical fall as seen on xray   Pain control as ordered   DVT prophylaxis chemical and mechanical post surgery  Monitor for blood loss  PT/OT post SIRS  Orthopedic consult  RCRI:  Class 1 risk, 3 9% 30 day risk of death, myocardial infarction or cardiac arrest   Patient is medically stable for surgery        Hypertensive urgency  Assessment & Plan  Present on admission as evidenced by systolic blood pressure greater than 200, likely due to pain, tremors from PD  Treat with amlodipine 5 mg once  Hydralazine prn for BP > 170 mmHg every 6 hours prn      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 ([facility] )  Assessment & Plan  Present, what for behavior abnormalities given changed environment, need for surgery with anesthesia, pain  Needs constant reorientation, familiar faces, window blinds up and open  Needs adequate pain control, monitor for urinary retention    Parkinson disease ([facility] )  Assessment & Plan  Continue oral medications including Azilect 1mg daily  No longer takes Sinemet     Microcytic hypochromic anemia  Assessment & Plan  Microcytic hypochromic anemia with anisocytosis, hemoglobin on presentation is 9 3, 4 years ago was 11 0  Monitor daily  Transfusion threshold is hemoglobin less than 7  Check iron studies     Ambulatory dysfunction  Assessment & Plan  Noted  According to , a cane was recommended but patient ambulating without   Needs PT/OT post surgery     VTE Pharmacologic Prophylaxis: VTE Score: 12 Moderate Risk (Score 3-4) - Pharmacological DVT Prophylaxis Ordered: enoxaparin (Lovenox)    Code Status: No Order full code  Discussion with family: Updated  () at bedside  Anticipated Length of Stay: Patient will be admitted on an inpatient basis with an anticipated length of stay of greater than 2 midnights secondary to Femoral fracture  Total Time for Visit, including Counseling / Coordination of Care: 70 minutes Greater than 50% of this total time spent on direct patient counseling and coordination of care  Chief Complaint:  Mechanical fall, femoral fracture    History of Present Illness: Adis Shaw is a 64 y o  female with a PMH of Parkinson's, who presents with a femoral fracture following a mechanical fall while trying to get out of bed this morning   was by her side when this happened but ws unable to lift her up and had to call for help from a neighbor  They eventually called EMS due to pain and inability to stand on her L leg  At baseline she is meant to ambulate with a walker given ambulatory dysfunction from PD but she has been ambulating on her own with difficulty  Review of Systems:  Review of Systems   Constitutional: Negative for chills, diaphoresis and fever  Respiratory: Negative for cough, chest tightness, shortness of breath and wheezing  Cardiovascular: Negative for chest pain, palpitations and leg swelling  Gastrointestinal: Negative for abdominal pain, constipation and diarrhea  Genitourinary: Negative for dysuria, flank pain, frequency, hematuria and urgency  Neurological: Negative for dizziness, light-headedness, numbness and headaches  Past Medical and Surgical History:   Past Medical History:   Diagnosis Date    Constipation     Hyperlipidemia     Hypertension     Parkinson disease ([facility] )        Past Surgical History:   Procedure Laterality Date     SECTION      CHOLECYSTECTOMY         Meds/Allergies:  Prior to Admission medications    Medication Sig Start Date End Date Taking?  Authorizing Provider   ALPRAZolam Ashwini Eason 0 25 mg tablet Take 1 tablet (0 25 mg total) by mouth 2 (two) times a day as needed for anxiety 4/19/21   Deejay Reyes,    carbidopa-levodopa (SINEMET)  mg per tablet Take 1 tablet by mouth 3 (three) times a day  Patient not taking: Reported on 7/11/2021 1/13/21   Lc Cuadra MD   esomeprazole (NexIUM) 20 mg capsule Take 20 mg by mouth every morning before breakfast    Historical Provider, MD   latanoprost (XALATAN) 0 005 % ophthalmic solution 1 drop daily at bedtime    Historical Provider, MD   metoprolol tartrate (LOPRESSOR) 50 mg tablet Take 1 tablet (50 mg total) by mouth 2 (two) times a day 5/6/21   Deejay Reyes DO   pravastatin (PRAVACHOL) 40 mg tablet Take 40 mg by mouth daily  Historical Provider, MD   rasagiline (AZILECT) 1 MG Take 1 tablet (1 mg total) by mouth daily 8/11/20   Jm Yip MD   timolol (BETIMOL) 0 5 % ophthalmic solution Administer 1 drop to both eyes daily  Historical Provider, MD   donepezil (ARICEPT) 5 mg tablet Take 1 tablet (5 mg total) by mouth daily at bedtime 9/11/20 1/13/21  Jm Yip MD     I have reviewed home medications using recent Epic encounter      Allergies: No Known Allergies    Social History:  Marital Status: /Civil Union   Occupation:   Patient Pre-hospital Living Situation: Home  Patient Pre-hospital Level of Mobility: walks  Patient Pre-hospital Diet Restrictions:   Substance Use History:   Social History     Substance and Sexual Activity   Alcohol Use Yes    Comment: Socially     Social History     Tobacco Use   Smoking Status Former Smoker   Smokeless Tobacco Never Used     Social History     Substance and Sexual Activity   Drug Use No       Family History:  Family History   Problem Relation Age of Onset    Tremor Neg Hx     Parkinsonism Neg Hx        Physical Exam:     Vitals:   Blood Pressure: (!) 204/111 (07/11/21 1030)  Pulse: 85 (07/11/21 1030)  Temperature: 97 8 °F (36 6 °C) (07/11/21 0914)  Temp Source: Temporal (07/11/21 0914)  Respirations: 17 (07/11/21 1030)  SpO2: 98 % (07/11/21 1030)    Physical Exam  Vitals and nursing note reviewed  Constitutional:       General: She is not in acute distress  Appearance: She is well-developed  HENT:      Head: Normocephalic and atraumatic  Eyes:      Conjunctiva/sclera: Conjunctivae normal    Cardiovascular:      Rate and Rhythm: Normal rate and regular rhythm  Heart sounds: No murmur heard  Pulmonary:      Effort: Pulmonary effort is normal  No respiratory distress  Breath sounds: Normal breath sounds  Abdominal:      Palpations: Abdomen is soft  Tenderness: There is no abdominal tenderness  Musculoskeletal:         General: Deformity present  Cervical back: Neck supple  Right lower leg: No edema  Left lower leg: No edema  Comments: LLE shorter, pain, reduced ROM   Skin:     General: Skin is warm and dry  Neurological:      General: No focal deficit present  Mental Status: She is alert and oriented to person, place, and time  Mental status is at baseline  Cranial Nerves: No cranial nerve deficit  Motor: No weakness  Additional Data:     Lab Results:  Results from last 7 days   Lab Units 07/11/21  0919   WBC Thousand/uL 7 10   HEMOGLOBIN g/dL 9 3*   HEMATOCRIT % 30 3*   PLATELETS Thousands/uL 181   NEUTROS PCT % 79*   LYMPHS PCT % 14   MONOS PCT % 5   EOS PCT % 1     Results from last 7 days   Lab Units 07/11/21  0919   SODIUM mmol/L 139   POTASSIUM mmol/L 3 8   CHLORIDE mmol/L 100   CO2 mmol/L 31   BUN mg/dL 13   CREATININE mg/dL 1 17   ANION GAP mmol/L 8   CALCIUM mg/dL 8 9   ALBUMIN g/dL 4 1   TOTAL BILIRUBIN mg/dL 0 80   ALK PHOS U/L 105   ALT U/L 22   AST U/L 23   GLUCOSE RANDOM mg/dL 99     Results from last 7 days   Lab Units 07/11/21  0919   INR  1 01                   Imaging: Personally reviewed the following imaging: xray(s)  TRAUMA - CT head wo contrast   Final Result by Travis Patrick MD (07/11 1000)      No acute intracranial abnormality    Stable microangiopathic changes when allowing for difference in modality  Workstation performed: [id number]         XR Trauma chest portable   ED Interpretation by Collette Horan PA-C (07/11 4382)   No acute abnormalities      XR Trauma pelvis ap only 1 or 2 vw   ED Interpretation by Collette Horan PA-C (07/11 7020)   Fracture femoral neck      XR hip/pelv 2-3 vws left   ED Interpretation by Collette Horan PA-C (07/11 7899)   Fracture left femoral neck  XR knee 4+ views left injury   ED Interpretation by Collette Horan PA-C (07/11 1936)   No acute fracture  EKG and Other Studies Reviewed on Admission:   · EKG: No EKG obtained  ** Please Note: This note has been constructed using a voice recognition system   **

## 2021-07-11 NOTE — PROGRESS NOTES
JIM Yeboah  Attending, Orthopaedic Surgery  Foot and Ankle  [facility]      ORTHOPAEDIC POST-OP CHECK       Procedure:     Left hip hemiarthroplasty       Date of surgery:   7/11/21      PLAN  1  Weightbearing Status- WBAT operative extremity- posterior hip precautions (No hip flexion past 90 degrees, no internal rotation, no adduction and hip abduction pillow while in bed)  2  DVT prophylaxis- Lovenox 40mg Subcu Daily  3  Perioperative Ancef  4  Pain control- Tylenol, Oxy, Morphine PRN  5  PT and OT evaluations  6  Case management consult  7  Follow-up in 3 weeks for staple removal and Xrays    History of Present Illness:   Chief Complaint: s/p above procedure  Bernie Rivera is a 64 y o  female who is being seen post-operatively s/p the above procedure  Pain is well controlled and patient has no complaints  Review of Systems:  General- denies fever/chills  Respiratory- denies cough or SOB  Cardio- denies chest pain or palpitations  GI- denies abdominal pain  Musculoskeletal- Negative except noted above  Skin- denies rashes or wounds    Physical Exam:   BP (!) 200/89 (BP Location: Left arm)   Pulse 62   Temp 98 1 °F (36 7 °C) (Oral)   Resp 19   LMP  (LMP Unknown)   SpO2 95%   General/Constitutional: No apparent distress: well-nourished and well developed  Eyes: normal ocular motion  Lymphatic: No appreciable lymphadenopathy  Respiratory: Non-labored breathing  Vascular: No edema, swelling or tenderness, except as noted in detailed exam   Integumentary: No impressive skin lesions present, except as noted in detailed exam   Neuro: No ataxia or tremors noted  Psych: Normal mood and affect, oriented to person, place and time  Appropriate affect  Musculoskeletal: Normal, except as noted in detailed exam and in HPI      Examination    left        Dressing Clean, dry, intact      Ecchymosis None    Swelling Mild    Sensation Intact to light touch throughout sural, saphenous, superficial peroneal, deep peroneal and medial/lateral plantar nerve distributions  Pax-Jigna 5 07 filament (10g) testing deferred  Cardiovascular Brisk capillary refill < 2 seconds,intact DP and PT pulses    Special Tests None      Imaging Studies:   Pacu Xrays pending        James R Lachman, MD  Foot & Ankle Surgery   Department of [address] [place] [address]      I personally performed the service  Ona Pringle Lachman, MD

## 2021-07-11 NOTE — ASSESSMENT & PLAN NOTE
Present, what for behavior abnormalities given changed environment, need for surgery with anesthesia, pain  Needs constant reorientation, familiar faces, window blinds up and open  Needs adequate pain control, monitor for urinary retention

## 2021-07-11 NOTE — ASSESSMENT & PLAN NOTE
Microcytic hypochromic anemia with anisocytosis, hemoglobin on presentation is 9 3, 4 years ago was 11 0  Monitor daily  Transfusion threshold is hemoglobin less than 7  Check iron studies

## 2021-07-11 NOTE — OP NOTE
OPERATIVE REPORT  PATIENT NAME: Alfred Beck    :  1945  MRN: [id number]  Pt Location:  OR ROOM 02    SURGERY DATE: 2021    Surgeon(s) and Role:     Mary Sawyer MD - Primary     * Jane Lewis PA-C - Assisting    Preop Diagnosis:  Closed subcapital fracture of left femur, initial encounter ([facility] ) Yasmeen Weems    Post-Op Diagnosis Codes:     * Closed subcapital fracture of left femur, initial encounter ([place] [address] ) [S72 012A]    Procedure(s) (LRB):  HEMIARTHROPLASTY HIP (BIPOLAR) (Left)    Specimen(s):  * No specimens in log *    Estimated Blood Loss:   400 mL    Drains:  * No LDAs found *    Anesthesia Type:   Choice    Operative Indications:  Closed subcapital fracture of left femur, initial encounter ([place] [address] ) [S72 012A]      Operative Findings:  Consistent with diagnosis    Complications:   None    Procedure and Technique:  1  Left hip hemiarthroplasty    The patient was identified in the preoperative holding area, and the surgical site was marked by the surgeon  The patient was transported to the operating room, and general anesthesia was administered on the stretcher  The patient was transferred to the OR table and placed in the lateral decubitus position  The left hip was prepped and draped in the usual sterile fashion  Prophylactic antibiotics were given within 1 hour of incision  Following a surgical timeout, a 10 cm incision was made over the posterolateral aspect of the hip with the 10-blade scalpel  Deep dissection was carried out sharply to the fascia tatiana, which was incised along the longitudinal axis, and a Charnley retractor was placed  The trochanteric bursa was reflected posteriorly  The short external rotators and posterior capsule were incised in a single layer, and a posterior capsular incision was made along the femoral neck to the acetabular rim, exposing the femoral neck fracture    Double-angled Hohmann retractors were placed around the femoral neck, and a femoral neck cut was made with the oscillating saw  The femoral head fragment was removed, and a size measurement was obtained  The acetabulum was irrigated with sterile saline solution using the pulse   A retractor was placed under the femoral neck  The box osteotome was passed through the lateral calcar  The femur was reamed manually with the T-handled reamer and then broached using the Base79 system  Trial neck and bipolar femoral head components were applied to the broach, and the hip was reduced  After optimal stability and leg length were achieved, the trial components were removed  The hip was irrigated extensively with sterile saline solution using the pulse   The surgeon's outer gloves were changed, and the femoral component with bipolar femoral head was inserted  The femoral head was confirmed to be secure, and the hip was reduced  Stability and leg length remained satisfactory  The hip was irrigated with sterile saline solution using the pulse   The posterior capsule was repaired with side-to-side #1 Vicryl suture in an interrupted figure-of-eight fashion  The posterior capsule and short external rotators were repaired in a combined layer through two drill holes in the greater trochanter using #5 Ethibond suture  The fascia tatiana was closed using #1 Vicryl suture in an interrupted figure-of-eight fashion  The deep soft tissue layer was closed with #1 Vicryl suture in a simple interrupted fashion  The subcuticular layer was closed with simple buried 2-0 Vicryl suture  The skin was closed with staples, and a Mepilex dressing was applied  A hip abduction pillow was placed, and anesthesia was discontinued  The patient was transferred to the stretcher and transported to the recovery area in stable condition      The assistance of an advanced practitioner was necessary for sterile draping, retraction of soft tissue and bone, and positioning of the operative extremity during the procedure       I was present for the entire procedure, A qualified resident physician was not available and A physician assistant was required during the procedure for retraction tissue handling,dissection and suturing    Patient Disposition:  PACU     SIGNATURE: Norris Ahumada, MD  DATE: July 11, 2021  TIME: 4:38 PM

## 2021-07-11 NOTE — ANESTHESIA POSTPROCEDURE EVALUATION
Post-Op Assessment Note    CV Status:  Stable    Pain management: adequate     Mental Status:  Awake and sleepy   Hydration Status:  Euvolemic   PONV Controlled:  Controlled   Airway Patency:  Patent      Post Op Vitals Reviewed: Yes      Staff: Anesthesiologist   Comments: Small bruise under left eye where eye was taped - no skin tear/bleeding  Antihypertensive ordered        No complications documented      BP  193/78   Temp (P) 97 5 °F (36 4 °C) (07/11/21 1642)    Pulse (P) 66 (07/11/21 1642)   Resp      SpO2 (P) 100 % (07/11/21 1642)

## 2021-07-11 NOTE — ASSESSMENT & PLAN NOTE
Due to a mechanical fall as seen on xray   Pain control as ordered   DVT prophylaxis chemical and mechanical post surgery  Monitor for blood loss  PT/OT post SIRS  Orthopedic consult  RCRI:  Class 1 risk, 3 9% 30 day risk of death, myocardial infarction or cardiac arrest   Patient is medically stable for surgery

## 2021-07-11 NOTE — ED PROVIDER NOTES
Emergency Department Trauma Note  Romie Bhatia 64 y o  female MRN: [id number]  Unit/Bed#: OR POOL/OR POOL Encounter: [id number]      Trauma Alert: Trauma Acuity: Trauma Evaluation  Model of Arrival: Mode of Arrival: BLS via    Trauma Team: Current Providers  Attending Provider: Bernice Samuel DO  Attending Provider: Roberta Isaacs MD  Physician Assistant: Carmela Fox PA-C  Registered Nurse: Richard Lora RN  Consulting Physician: David Dixon MD  Registered Nurse: Damien Camarillo RN  Surgeon: David Dixon MD  Patient Care Assistant: Ana María Whitfield  Consultants: Orthopaedics: Dr Camila Rodrigues  Time Called 1050      History of Present Illness     Chief Complaint:   Chief Complaint   Patient presents with    Hip Injury     pt woke up and just fell to her legs  pt complaing of 10/10 left hip pain  pt unable to put wait on leg     HPI:  Romie Bhatia is a 64 y o  female who presents with left hip pain after a fall that occurred 1 hour ago     Mechanism:Details of Incident: pt fell out of bed          Patient is a 65 y/o F with h/o Parkinson's disease and HTN that presents to the ED with left hip pain and deformity after a fall out of bed this morning  Patient states she was trying to get up out of bed and  was helping her, but she felt weak and fell to the floor  She states she did hit her head, but denies LOC, headache, dizziness, or vision changes  She has a superficial skin tear to right dorsal forearm  SHe states her tetanus is UTD  No numbness or tingling          History provided by:  Patient  Fall  Mechanism of injury: fall    Injury location:  Pelvis  Pelvic injury location:  L hip  Incident location:  Home  Time since incident:  1 hour  Arrived directly from scene: yes    Fall:     Fall occurred:  From a bed    Impact surface:  Hard floor    Point of impact:  Unable to specify  Protective equipment: none    Suspicion of alcohol use: no    Suspicion of drug use: no    Tetanus status:  Up to date  Prior to arrival data:     Patient ambulatory at scene: no      Blood loss:  None    Responsiveness at scene:  Alert    Orientation at scene:  Person, place and situation    Loss of consciousness: no      Amnesic to event: no      Immobilization:  None  Associated symptoms: no abdominal pain, no back pain, no chest pain, no difficulty breathing, no headaches, no loss of consciousness, no neck pain and no vomiting    Risk factors: beta blocker therapy    Risk factors: no anticoagulation therapy      Review of Systems   Constitutional: Negative for chills and fever  Eyes: Negative for visual disturbance  Respiratory: Negative for cough and shortness of breath  Cardiovascular: Negative for chest pain and leg swelling  Gastrointestinal: Negative for abdominal pain and vomiting  Musculoskeletal: Negative for back pain and neck pain  Left hip pain   Skin: Positive for wound (right dorsal forearm)  Negative for color change  Neurological: Positive for tremors  Negative for dizziness, loss of consciousness, weakness, light-headedness and headaches  Psychiatric/Behavioral: Negative for confusion  All other systems reviewed and are negative  Historical Information     Immunizations:   Immunization History   Administered Date(s) Administered    Influenza, high dose seasonal 0 7 mL 2019    SARS-CoV-2 / COVID-19 mRNA IM ([employer]) 2021, 2021       Past Medical History:   Diagnosis Date    Constipation     Hyperlipidemia     Hypertension     Parkinson disease ([facility] [address] )        Family History   Problem Relation Age of Onset    Tremor Neg Hx     Parkinsonism Neg Hx      Past Surgical History:   Procedure Laterality Date     SECTION      CHOLECYSTECTOMY       Social History     Tobacco Use    Smoking status: Former Smoker    Smokeless tobacco: Never Used   Vaping Use    Vaping Use: Never used   Substance Use Topics    Alcohol use:  Yes Comment: Socially    Drug use: No     E-Cigarette/Vaping    E-Cigarette Use Never User      E-Cigarette/Vaping Substances       Family History: non-contributory    Meds/Allergies   Prior to Admission Medications   Prescriptions Last Dose Informant Patient Reported? Taking? ALPRAZolam (XANAX) 0 25 mg tablet 7/10/2021 at Unknown time  No Yes   Sig: Take 1 tablet (0 25 mg total) by mouth 2 (two) times a day as needed for anxiety   esomeprazole (NexIUM) 20 mg capsule 7/10/2021 at Unknown time Self Yes Yes   Sig: Take 20 mg by mouth every morning before breakfast   latanoprost (XALATAN) 0 005 % ophthalmic solution 7/10/2021 at Unknown time Self Yes Yes   Si drop daily at bedtime   metoprolol tartrate (LOPRESSOR) 50 mg tablet 7/10/2021 at Unknown time  No Yes   Sig: Take 1 tablet (50 mg total) by mouth 2 (two) times a day   Patient taking differently: Take 50 mg by mouth daily    pravastatin (PRAVACHOL) 40 mg tablet 7/10/2021 at Unknown time Self Yes Yes   Sig: Take 40 mg by mouth daily  rasagiline (AZILECT) 1 MG 7/10/2021 at Unknown time Self No Yes   Sig: Take 1 tablet (1 mg total) by mouth daily   timolol (BETIMOL) 0 5 % ophthalmic solution 7/10/2021 at Unknown time Self Yes Yes   Sig: Administer 1 drop to both eyes daily  Facility-Administered Medications: None       No Known Allergies    PHYSICAL EXAM    PE limited by: nothing    Objective   Vitals:   First set: Temperature: 97 8 °F (36 6 °C) (21)  Pulse: 90 (21)  Respirations: 20 (21)  Blood Pressure: (!) 223/120 (21)  SpO2: 95 % (21)    Primary Survey:   (A) Airway: patent  (B) Breathing: normal  (C) Circulation: Pulses:   normal  (D) Disabliity:  GCS Total:  15  (E) Expose:  Completed    Secondary Survey: (Click on Physical Exam tab above)  Physical Exam  Vitals and nursing note reviewed  Constitutional:       General: She is not in acute distress  Appearance: Normal appearance   She is well-developed, well-groomed and normal weight  She is not ill-appearing or diaphoretic  HENT:      Head: Normocephalic and atraumatic  No abrasion, contusion or laceration  Right Ear: External ear normal       Left Ear: External ear normal       Nose: Nose normal       Mouth/Throat:      Mouth: Mucous membranes are moist       Pharynx: Oropharynx is clear  Eyes:      Extraocular Movements: Extraocular movements intact  Conjunctiva/sclera: Conjunctivae normal       Pupils: Pupils are equal, round, and reactive to light  Cardiovascular:      Rate and Rhythm: Normal rate and regular rhythm  Pulses:           Dorsalis pedis pulses are 2+ on the right side and 2+ on the left side  Heart sounds: Normal heart sounds  Pulmonary:      Effort: Pulmonary effort is normal       Breath sounds: Normal breath sounds  No wheezing, rhonchi or rales  Chest:      Chest wall: No swelling or tenderness  Abdominal:      General: Abdomen is flat  Bowel sounds are normal       Palpations: Abdomen is soft  Tenderness: There is no abdominal tenderness  Musculoskeletal:      Cervical back: Normal and normal range of motion  No spinous process tenderness or muscular tenderness  Thoracic back: Normal       Lumbar back: Normal       Left hip: Deformity, tenderness and bony tenderness present  Decreased range of motion  Left knee: Normal       Left ankle: Normal       Left foot: Normal       Comments: B/L UE and RLE FROM, nontender to palpation  Skin:     General: Skin is warm and dry  Comments: Superficial skin tear to right dorsal forearm  Neurological:      Mental Status: She is alert and oriented to person, place, and time  GCS: GCS eye subscore is 4  GCS verbal subscore is 5  GCS motor subscore is 6  Cranial Nerves: Cranial nerves are intact  Sensory: Sensation is intact  Motor: Tremor present     Psychiatric:         Mood and Affect: Mood normal          Speech: Speech normal          Behavior: Behavior is cooperative  Cervical spine cleared by clinical criteria? Yes     Invasive Devices     Peripheral Intravenous Line            Peripheral IV 07/11/21 Right Antecubital <1 day          Airway            ETT  Cuffed;Oral 7 mm <1 day                Lab Results:   Results Reviewed     Procedure Component Value Units Date/Time    Iron Saturation % [[id number]] Collected: 07/11/21 0919    Lab Status: In process Specimen: Blood from Arm, Right Updated: 07/11/21 1134    Ferritin [[id number]] Collected: 07/11/21 0919    Lab Status:  In process Specimen: Blood from Arm, Right Updated: 07/11/21 1134    Troponin I [[id number]]  (Normal) Collected: 07/11/21 0923    Lab Status: Final result Specimen: Blood from Arm, Right Updated: 07/11/21 1004     Troponin I <0 02 ng/mL     Protime-INR [[id number]]  (Normal) Collected: 07/11/21 0919    Lab Status: Final result Specimen: Blood from Arm, Right Updated: 07/11/21 0956     Protime 13 3 seconds      INR 1 01    APTT [[id number]]  (Abnormal) Collected: 07/11/21 0919    Lab Status: Final result Specimen: Blood from Arm, Right Updated: 07/11/21 0956     PTT 21 seconds     Comprehensive metabolic panel [[id number]] Collected: 07/11/21 0919    Lab Status: Final result Specimen: Blood from Arm, Right Updated: 07/11/21 0950     Sodium 139 mmol/L      Potassium 3 8 mmol/L      Chloride 100 mmol/L      CO2 31 mmol/L      ANION GAP 8 mmol/L      BUN 13 mg/dL      Creatinine 1 17 mg/dL      Glucose 99 mg/dL      Calcium 8 9 mg/dL      AST 23 U/L      ALT 22 U/L      Alkaline Phosphatase 105 U/L      Total Protein 7 2 g/dL      Albumin 4 1 g/dL      Total Bilirubin 0 80 mg/dL      eGFR 45 ml/min/1 73sq m     Narrative:      Paula guidelines for Chronic Kidney Disease (CKD):     Stage 1 with normal or high GFR (GFR > 90 mL/min/1 73 square meters)    Stage 2 Mild CKD (GFR = 60-89 mL/min/1 73 square meters)    Stage 3A Moderate CKD (GFR = 45-59 mL/min/1 73 square meters)    Stage 3B Moderate CKD (GFR = 30-44 mL/min/1 73 square meters)    Stage 4 Severe CKD (GFR = 15-29 mL/min/1 73 square meters)    Stage 5 End Stage CKD (GFR <15 mL/min/1 73 square meters)  Note: GFR calculation is accurate only with a steady state creatinine    CBC and differential [[id number]]  (Abnormal) Collected: 07/11/21 0919    Lab Status: Final result Specimen: Blood from Arm, Right Updated: 07/11/21 0929     WBC 7 10 Thousand/uL      RBC 4 36 Million/uL      Hemoglobin 9 3 g/dL      Hematocrit 30 3 %      MCV 70 fL      MCH 21 3 pg      MCHC 30 7 g/dL      RDW 17 3 %      MPV 10 4 fL      Platelets 936 Thousands/uL      nRBC 0 /100 WBCs      Neutrophils Relative 79 %      Immat GRANS % 1 %      Lymphocytes Relative 14 %      Monocytes Relative 5 %      Eosinophils Relative 1 %      Basophils Relative 0 %      Neutrophils Absolute 5 59 Thousands/µL      Immature Grans Absolute 0 06 Thousand/uL      Lymphocytes Absolute 1 02 Thousands/µL      Monocytes Absolute 0 38 Thousand/µL      Eosinophils Absolute 0 04 Thousand/µL      Basophils Absolute 0 01 Thousands/µL     UA w Reflex to Microscopic w Reflex to Culture [[id number]]     Lab Status: No result Specimen: Urine, Clean Catch                  Imaging Studies:   Direct to CT: Yes  TRAUMA - CT head wo contrast   Final Result by Antonella Cortes MD (07/11 1000)      No acute intracranial abnormality  Stable microangiopathic changes when allowing for difference in modality  Workstation performed: [id number]         XR Trauma chest portable   ED Interpretation by Edi Pulido PA-C (07/11 0925)   No acute abnormalities      XR Trauma pelvis ap only 1 or 2 vw   ED Interpretation by Edi Pulido PA-C (07/11 3045)   Fracture femoral neck      XR hip/pelv 2-3 vws left   ED Interpretation by Edi Pulido PA-C (07/11 8414)   Fracture left femoral neck         XR knee 4+ views left injury   ED Interpretation by Andrea Reynolds PA-C (07/11 1093)   No acute fracture  Procedures  ECG 12 Lead Documentation Only    Date/Time: 7/11/2021 10:15 AM  Performed by: Andrea Reynolds PA-C  Authorized by: Andrea Reynolds PA-C     Indications / Diagnosis:  Fall  ECG reviewed by me, the ED Provider: yes    Patient location:  ED  Quality:     Tracing quality:  Limited by artifact (due to tremors)  Rate:     ECG rate:  83  ST segments:     ST segments:  Normal             ED Course  ED Course as of Jul 11 1617   Sun Jul 11, 2021   1005 Ortho paged concerning left hip fracture  80 SLIM also paged for admission  MDM  Number of Diagnoses or Management Options  Closed left hip fracture [facility]): new and requires workup  Fall: new and requires workup  Head injury: new and requires workup  Skin tear of right forearm without complication: minor  Diagnosis management comments: Patient with left hip pain after a fall, will order xray to r/o fracture  Patient did hit head, will order CT scan to r/o brain hemorrhage  No other signs of trauma  Tetanus is UTD per patient  Patient with left hip fracture, will admit for further treatment  Amount and/or Complexity of Data Reviewed  Clinical lab tests: ordered and reviewed  Tests in the radiology section of CPT®: ordered and reviewed  Discuss the patient with other providers: yes (Dr Charles Archuleta  )    Patient Progress  Patient progress: stable          Disposition  Priority One Transfer: No  Final diagnoses:   Fall   Closed left hip fracture ([facility] [address] )   Skin tear of right forearm without complication   Head injury     Time reflects when diagnosis was documented in both MDM as applicable and the Disposition within this note     Time User Action Codes Description Comment    7/11/2021 10:08 AM Tito Marquez Add [T07  XXXA] Fall     7/11/2021 10:08 AM Tito Marquez Add [S72 002A] Closed left hip fracture ([facility] [address] ) 7/11/2021 10:08 AM Flynn Carcamo Add [C83 325M] Skin tear of right forearm without complication     8/59/2429 10:08 AM Flynn Dona Add [S09 90XA] Head injury     7/11/2021 10:46 AM Lachman, Ona Pringle Add [S72 012A] Closed subcapital fracture of left femur, initial encounter ([facility] [address] )     7/11/2021 11:00 AM Rox Hernandez Add [S72 002A] Closed displaced fracture of left femoral neck [facility])       ED Disposition     ED Disposition Condition Date/Time Comment    Admit Stable Sun Jul 11, 2021 10:51 AM Case was discussed with Dr Aleksandr Craig and the patient's admission status was agreed to be Admission Status: inpatient status to the service of Dr Cheli Serrato   Follow-up Information    None       Current Discharge Medication List      CONTINUE these medications which have NOT CHANGED    Details   ALPRAZolam (XANAX) 0 25 mg tablet Take 1 tablet (0 25 mg total) by mouth 2 (two) times a day as needed for anxiety  Qty: 60 tablet, Refills: 1    Associated Diagnoses: Chronic anxiety      esomeprazole (NexIUM) 20 mg capsule Take 20 mg by mouth every morning before breakfast      latanoprost (XALATAN) 0 005 % ophthalmic solution 1 drop daily at bedtime      metoprolol tartrate (LOPRESSOR) 50 mg tablet Take 1 tablet (50 mg total) by mouth 2 (two) times a day  Qty: 180 tablet, Refills: 1    Associated Diagnoses: Hypertension, essential      pravastatin (PRAVACHOL) 40 mg tablet Take 40 mg by mouth daily  rasagiline (AZILECT) 1 MG Take 1 tablet (1 mg total) by mouth daily  Qty: 90 each, Refills: 3    Associated Diagnoses: Parkinson disease (HCC)      timolol (BETIMOL) 0 5 % ophthalmic solution Administer 1 drop to both eyes daily  No discharge procedures on file      PDMP Review       Value Time User    PDMP Reviewed  Yes 7/11/2021 10:53 AM Tracee Webster MD          ED Provider  Electronically Signed by         Linda Toro PA-C  07/11/21 4938

## 2021-07-11 NOTE — ANESTHESIA PREPROCEDURE EVALUATION
Procedure:  HEMIARTHROPLASTY HIP (BIPOLAR) (Left Hip)    Relevant Problems   ANESTHESIA (within normal limits)      CARDIO   (+) Hyperlipidemia   (+) Hypertension   (+) Hypertensive urgency      GI/HEPATIC   (+) Gastroesophageal reflux disease      HEMATOLOGY   (+) Anemia due to poor nutrition   (+) Microcytic hypochromic anemia      MUSCULOSKELETAL   (+) Low back pain   (+) Lumbar spondylosis      NEURO/PSYCH   (+) Chronic anxiety   (+) Chronic pain syndrome      PULMONARY (within normal limits)   (-) Sleep apnea   (-) Smoking   (-) URI (upper respiratory infection)      Other   (+) Ambulatory dysfunction   (+) Closed displaced fracture of left femoral neck (HCC)   (+) 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 (HCC)   (+) Lumbosacral spinal stenosis   (+) Parkinson disease (HCC)        Physical Exam    Airway    Mallampati score: III  TM Distance: <3 FB  Neck ROM: limited     Dental   Comment: Single upper incisor, broken off lower front teeth  Denies loose,     Cardiovascular      Pulmonary      Other Findings       Lab Results   Component Value Date    WBC 7 10 07/11/2021    HGB 9 3 (L) 07/11/2021     07/11/2021     Lab Results   Component Value Date    SODIUM 139 07/11/2021    K 3 8 07/11/2021    BUN 13 07/11/2021    CREATININE 1 17 07/11/2021    EGFR 45 07/11/2021     Lab Results   Component Value Date    PTT 21 (L) 07/11/2021      Lab Results   Component Value Date    INR 1 01 07/11/2021     Blood type A-    Lab Results   Component Value Date    HGBA1C 6 1 11/06/2017         Anesthesia Plan  ASA Score- 3     Anesthesia Type- general with ASA Monitors  Additional Monitors:   Airway Plan: ETT  Comment: Possible transfusion  Plan Factors-    Chart reviewed  EKG reviewed  Imaging results reviewed  Existing labs reviewed  Patient summary reviewed  Patient is not a current smoker           Induction-     Postoperative Plan-     Informed Consent- Anesthetic plan and risks discussed with

## 2021-07-11 NOTE — CONSULTS
JIM Lara  Attending, Orthopaedic Surgery  Foot and Ankle  [facility]      ORTHOPAEDIC FOOT AND ANKLE CLINIC VISIT     Assessment:   Left subcapital femoral neck fracture displaced         Plan:   · The patient verbalized understanding of exam findings and treatment plan  We engaged in the shared decision-making process and treatment options were discussed at length with the patient  Surgical and conservative management discussed today along with risks and benefits  · Patient has a displaced subcapital femoral neck fracture amenable to hemiarthroplasty  · Consent signed  · NPO  · Plan for surgery today  CONSENT FOR BONY PROCEDURES:   Patient understands that there is no guarantee that the surgery will relieve all of Her pain and also understands that there may be a prolonged course of protected weight-bearing status required which will restrict them from driving and other activities as discussed at today's visit  Patient recognizes that there are risks with surgery including bleeding, numbness, nerve irritation, wound complications, infection, continued pain, joint stiffness, malunion, nonunion, anesthetic complications, death, failure of procedure and possible need for further surgery  The patient understands that there is no guarantee that this surgery will relieve all of Her pain and symptoms  Patient understands that there is no guarantee that they will return to full function after the procedure  Patient has provided informed consent for the procedure  History of Present Illness:   Chief Complaint:   Chief Complaint   Patient presents with    Hip Injury     pt woke up and just fell to her legs  pt complaing of 10/10 left hip pain  pt unable to put wait on leg     Sam Tafoya is a 64 y o  female who is being seen for left hip fracture   She sustained the fall this AM prior to arrival   Pain is localized at left hip with minimal radiating and described as sharp and severe  Patient denies numbness, tingling or radicular pain  Denies history of neuropathy  Patient does not smoke, does not have diabetes and does not take blood thinners  Patient denies family history of anesthesia complications and has not had any complications with anesthesia  Prior treatment   · NSAIDsYes   · Injections No   · Bracing/Orthotics No    · Physical Therapy Yes     Orthopedic Surgical History:   See below    Past Medical, Surgical and Social History:  Past Medical History:  has a past medical history of Constipation, Hyperlipidemia, Hypertension, and Parkinson disease ([facility] )  Problem List: does not have any pertinent problems on file  Past Surgical History:  has a past surgical history that includes Cholecystectomy and  section  Family History: family history is not on file  Social History:  reports that she has quit smoking  She has never used smokeless tobacco  She reports current alcohol use  She reports that she does not use drugs  Current Medications: [unfilled]  Allergies: has No Known Allergies  Review of Systems:  General- denies fever/chills  HEENT- denies hearing loss or sore throat  Eyes- denies eye pain or visual disturbances, denies red eyes  Respiratory- denies cough or SOB  Cardio- denies chest pain or palpitations  GI- denies abdominal pain  Endocrine- denies urinary frequency  Urinary- denies pain with urination  Musculoskeletal- Negative except noted above  Skin- denies rashes or wounds  Neurological- denies dizziness or headache  Psychiatric- denies anxiety or difficulty concentrating    Physical Exam:   BP (!) 200/89 (BP Location: Left arm)   Pulse 62   Temp 98 1 °F (36 7 °C) (Oral)   Resp 19   LMP  (LMP Unknown)   SpO2 95%   General/Constitutional: No apparent distress: well-nourished and well developed    Eyes: normal ocular motion  Cardio: RRR, Normal S1S2, No m/r/g  Lymphatic: No appreciable lymphadenopathy  Respiratory: Non-labored breathing, CTA b/l no w/c/r  Vascular: No edema, swelling or tenderness, except as noted in detailed exam   Integumentary: No impressive skin lesions present, except as noted in detailed exam   Neuro: No ataxia or tremors noted  Psych: Normal mood and affect, oriented to person, place and time  Appropriate affect  Musculoskeletal: Normal, except as noted in detailed exam and in HPI  Examination    Left    Gait Not assessed   Musculoskeletal +logroll    Skin Normal       Nails Normal    Range of Motion  Not assessed    Stability unstable    Muscle Strength 5/5 tibialis anterior  5/5 gastrocnemius-soleus  5/5 posterior tibialis  5/5 peroneal/eversion strength  5/5 EHL  5/5 FHL    Neurologic Normal    Sensation Intact to light touch throughout sural, saphenous, superficial peroneal, deep peroneal and medial/lateral plantar nerve distributions  [place] 5 07 filament (10g) testing deferred  Cardiovascular Brisk capillary refill < 2 seconds,intact DP and PT pulses    Special Tests None      Imaging Studies:   3 views of the left hip and pelvis were taken, reviewed and interpreted independently that demonstrate displaced subcapital femoral neck fracture  Reviewed by me personally  Betty Patten Lachman, MD  Foot & Ankle Surgery   Department of [address] [place] [address]      I personally performed the service  Betty Patten Lachman, MD

## 2021-07-11 NOTE — ASSESSMENT & PLAN NOTE
Present on admission as evidenced by systolic blood pressure greater than 200, likely due to pain, tremors from PD  Treat with amlodipine 5 mg once  Hydralazine prn for BP > 170 mmHg every 6 hours prn

## 2021-07-12 PROBLEM — D62 ACUTE BLOOD LOSS ANEMIA: Status: ACTIVE | Noted: 2021-07-12

## 2021-07-12 LAB
ANION GAP SERPL CALCULATED.3IONS-SCNC: 9 MMOL/L (ref 4–13)
BUN SERPL-MCNC: 11 MG/DL (ref 5–25)
CALCIUM SERPL-MCNC: 7.6 MG/DL (ref 8.3–10.1)
CHLORIDE SERPL-SCNC: 102 MMOL/L (ref 100–108)
CO2 SERPL-SCNC: 25 MMOL/L (ref 21–32)
CREAT SERPL-MCNC: 0.95 MG/DL (ref 0.6–1.3)
ERYTHROCYTE [DISTWIDTH] IN BLOOD BY AUTOMATED COUNT: 17.1 % (ref 11.6–15.1)
GFR SERPL CREATININE-BSD FRML MDRD: 58 ML/MIN/1.73SQ M
GLUCOSE SERPL-MCNC: 124 MG/DL (ref 65–140)
GLUCOSE SERPL-MCNC: 137 MG/DL (ref 65–140)
GLUCOSE SERPL-MCNC: 152 MG/DL (ref 65–140)
HCT VFR BLD AUTO: 22.8 % (ref 34.8–46.1)
HCT VFR BLD AUTO: 25.6 % (ref 34.8–46.1)
HGB BLD-MCNC: 7 G/DL (ref 11.5–15.4)
HGB BLD-MCNC: 7.8 G/DL (ref 11.5–15.4)
MCH RBC QN AUTO: 21 PG (ref 26.8–34.3)
MCHC RBC AUTO-ENTMCNC: 30.5 G/DL (ref 31.4–37.4)
MCV RBC AUTO: 69 FL (ref 82–98)
PLATELET # BLD AUTO: 175 THOUSANDS/UL (ref 149–390)
PMV BLD AUTO: 10.5 FL (ref 8.9–12.7)
POTASSIUM SERPL-SCNC: 3.6 MMOL/L (ref 3.5–5.3)
RBC # BLD AUTO: 3.71 MILLION/UL (ref 3.81–5.12)
SODIUM SERPL-SCNC: 136 MMOL/L (ref 136–145)
WBC # BLD AUTO: 9.78 THOUSAND/UL (ref 4.31–10.16)

## 2021-07-12 PROCEDURE — 85014 HEMATOCRIT: CPT | Performed by: INTERNAL MEDICINE

## 2021-07-12 PROCEDURE — 97167 OT EVAL HIGH COMPLEX 60 MIN: CPT

## 2021-07-12 PROCEDURE — 85018 HEMOGLOBIN: CPT | Performed by: INTERNAL MEDICINE

## 2021-07-12 PROCEDURE — 80048 BASIC METABOLIC PNL TOTAL CA: CPT | Performed by: INTERNAL MEDICINE

## 2021-07-12 PROCEDURE — P9016 RBC LEUKOCYTES REDUCED: HCPCS

## 2021-07-12 PROCEDURE — 97163 PT EVAL HIGH COMPLEX 45 MIN: CPT

## 2021-07-12 PROCEDURE — 99024 POSTOP FOLLOW-UP VISIT: CPT | Performed by: ORTHOPAEDIC SURGERY

## 2021-07-12 PROCEDURE — 85027 COMPLETE CBC AUTOMATED: CPT | Performed by: INTERNAL MEDICINE

## 2021-07-12 PROCEDURE — 86902 BLOOD TYPE ANTIGEN DONOR EA: CPT

## 2021-07-12 PROCEDURE — 82948 REAGENT STRIP/BLOOD GLUCOSE: CPT

## 2021-07-12 PROCEDURE — 99233 SBSQ HOSP IP/OBS HIGH 50: CPT | Performed by: INTERNAL MEDICINE

## 2021-07-12 RX ORDER — FUROSEMIDE 10 MG/ML
20 INJECTION INTRAMUSCULAR; INTRAVENOUS ONCE
Status: COMPLETED | OUTPATIENT
Start: 2021-07-12 | End: 2021-07-12

## 2021-07-12 RX ADMIN — TIMOLOL MALEATE 1 DROP: 5 SOLUTION/ DROPS OPHTHALMIC at 17:26

## 2021-07-12 RX ADMIN — SODIUM CHLORIDE 125 ML/HR: 0.9 INJECTION, SOLUTION INTRAVENOUS at 02:14

## 2021-07-12 RX ADMIN — PRAVASTATIN SODIUM 40 MG: 40 TABLET ORAL at 09:27

## 2021-07-12 RX ADMIN — ACETAMINOPHEN 975 MG: 325 TABLET, FILM COATED ORAL at 14:17

## 2021-07-12 RX ADMIN — LIDOCAINE 1 PATCH: 50 PATCH TOPICAL at 09:25

## 2021-07-12 RX ADMIN — METOPROLOL TARTRATE 50 MG: 50 TABLET, FILM COATED ORAL at 17:19

## 2021-07-12 RX ADMIN — METOPROLOL TARTRATE 50 MG: 50 TABLET, FILM COATED ORAL at 09:27

## 2021-07-12 RX ADMIN — LATANOPROST 1 DROP: 50 SOLUTION OPHTHALMIC at 21:10

## 2021-07-12 RX ADMIN — DOCUSATE SODIUM 100 MG: 100 CAPSULE, LIQUID FILLED ORAL at 17:19

## 2021-07-12 RX ADMIN — SODIUM CHLORIDE 125 ML/HR: 0.9 INJECTION, SOLUTION INTRAVENOUS at 10:31

## 2021-07-12 RX ADMIN — DOCUSATE SODIUM 100 MG: 100 CAPSULE, LIQUID FILLED ORAL at 09:27

## 2021-07-12 RX ADMIN — TIMOLOL MALEATE 1 DROP: 5 SOLUTION/ DROPS OPHTHALMIC at 17:21

## 2021-07-12 RX ADMIN — RASAGILINE 1 MG: 1 TABLET ORAL at 09:29

## 2021-07-12 RX ADMIN — ACETAMINOPHEN 975 MG: 325 TABLET, FILM COATED ORAL at 21:09

## 2021-07-12 RX ADMIN — SENNOSIDES 8.6 MG: 8.6 TABLET, FILM COATED ORAL at 09:27

## 2021-07-12 RX ADMIN — ACETAMINOPHEN 975 MG: 325 TABLET, FILM COATED ORAL at 06:01

## 2021-07-12 RX ADMIN — PANTOPRAZOLE SODIUM 20 MG: 20 TABLET, DELAYED RELEASE ORAL at 06:01

## 2021-07-12 RX ADMIN — TIMOLOL MALEATE 1 DROP: 5 SOLUTION/ DROPS OPHTHALMIC at 09:28

## 2021-07-12 RX ADMIN — FUROSEMIDE 20 MG: 10 INJECTION, SOLUTION INTRAVENOUS at 17:19

## 2021-07-12 NOTE — ASSESSMENT & PLAN NOTE
Patient had a mechanical fall and suffered a left femoral neck fracture  She underwent surgery by Ortho on a July 11  Patient is stable postop  Physical Occupational therapy recommended SNF rehab    Continue Lovenox for DVT prevention    Continue p r n   Oxycodone for pain control    I discussed the case with patient's daughter at the bedside in detail

## 2021-07-12 NOTE — ASSESSMENT & PLAN NOTE
Patient has chronic hypertension and she had hypertensive urgency on admission with systolic blood pressure of 223    Blood pressure is 000 systolic this afternoon, controlled      Continue metoprolol

## 2021-07-12 NOTE — ASSESSMENT & PLAN NOTE
· Present, what for behavior abnormalities given changed environment, need for surgery with anesthesia, pain  · Needs constant reorientation, familiar faces, window blinds up and open  · Needs adequate pain control, monitor for urinary retention  · Continue oral medications including Azilect 1mg daily  · No longer takes Sinemet

## 2021-07-12 NOTE — PROGRESS NOTES
[place]  Progress Note - Merline Wolf 1945, 64 y o  female MRN: [id number]  Unit/Bed#: -01 Encounter: [id number]  Primary Care Provider: Lori Rawls DO   Date and time admitted to hospital: 7/11/2021  8:53 AM    * [facility]  Assessment & Plan  Due to a mechanical fall as seen on xray   Pain control as ordered   DVT prophylaxis chemical and mechanical post surgery  PT/OT: recommending post-acute rehab   D/C IVF hydration as patient is currently tolerating regular diet    · Ortho consulted appreciate input   · Pt is currently POD#1 s/p left hip hemiarthroplasty   · WBAT to LLE with assistance   · Continue to monitor H&H and vitals  · Monitor for blood loss- hgb currently 7 8 was 9 3 yesterday- ortho recommending blood transfusion    · Will obtain blood consent and placed order for 1 unit of PRBC with 1 dose of IV lasix 20mg         Hypertensive urgency  Assessment & Plan  · Present on admission as evidenced by systolic blood pressure greater than 200, likely due to pain, tremors from PD- BP have improved currently 130/65  · Was treated with amlodipine 5 mg once  · Hydralazine prn for BP > 170 mmHg every 6 hours prn      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 ([facility] )  Assessment & Plan  · Present, what for behavior abnormalities given changed environment, need for surgery with anesthesia, pain  · Needs constant reorientation, familiar faces, window blinds up and open  · Needs adequate pain control, monitor for urinary retention  · Continue oral medications including Azilect 1mg daily  · No longer takes Sinemet     Parkinson disease ([facility] )  Assessment & Plan  Continue oral medications including Azilect 1mg daily  No longer takes Sinemet     Microcytic hypochromic anemia  Assessment & Plan  · Microcytic hypochromic anemia with anisocytosis, hemoglobin on presentation is 9 3, 4 years ago was 11 0  · Monitor daily- hemoglobin today POD#1 is 7 8   · Per ortho- recommend blood transfusion as hgb is likely to continue to drop which is most commonly seen POD#2   · Will give 1 unit of PRBC today   · Check iron studies    Ambulatory dysfunction  Assessment & Plan  Noted  According to , a cane was recommended but patient ambulating without   Needs PT/OT post surgery: currently recommending post-acute rehab           VTE Pharmacologic Prophylaxis: VTE Score: 12 High Risk (Score >/= 5) - Pharmacological DVT Prophylaxis Ordered: heparin  Sequential Compression Devices Ordered  Patient Centered Rounds: I performed bedside rounds with nursing staff today  Discussions with Specialists or Other Care Team Provider: none     Education and Discussions with Family / Patient: Updated  () at bedside  Time Spent for Care: 45 minutes  More than 50% of total time spent on counseling and coordination of care as described above  Current Length of Stay: 1 day(s)  Current Patient Status: Inpatient   Certification Statement: The patient will continue to require additional inpatient hospital stay due to PT/OT recommending placement to acute rehab; monitoring of hgb, s/p left hip hemiarthroplasty   Discharge Plan: Anticipate discharge in 24-48 hrs to rehab facility  Code Status: Level 1 - Full Code    Subjective:   Pt states that she is feeling more tired than normal however her pain is currently managed with current medication regimen  She is currently tolerating PO regular diet  PT recommends post-acute rehab which both patient and  are agreeable with- they are requesting somewhere close to home  Patient currently denies any fever, chills, chest pain, SOB, abdominal pain, NVD       Objective:     Vitals:   Temp (24hrs), Av 3 °F (36 8 °C), Min:97 5 °F (36 4 °C), Max:99 3 °F (37 4 °C)    Temp:  [97 5 °F (36 4 °C)-99 3 °F (37 4 °C)] 98 6 °F (37 °C)  HR:  [62-96] 64  Resp:  [16-26] 18  BP: (130-193)/(61-84) 130/65  SpO2: [94 %-100 %] 96 %  Body mass index is 21 02 kg/m²  Input and Output Summary (last 24 hours): Intake/Output Summary (Last 24 hours) at 7/12/2021 1532  Last data filed at 7/12/2021 1200  Gross per 24 hour   Intake 4285 42 ml   Output 900 ml   Net 3385 42 ml       Physical Exam:   Physical Exam  Constitutional:       General: She is not in acute distress  Appearance: She is not toxic-appearing  HENT:      Head: Normocephalic and atraumatic  Mouth/Throat:      Mouth: Mucous membranes are moist    Eyes:      General: No scleral icterus  Pupils: Pupils are equal, round, and reactive to light  Cardiovascular:      Rate and Rhythm: Normal rate and regular rhythm  Heart sounds: Normal heart sounds  Pulmonary:      Breath sounds: Normal breath sounds  No wheezing, rhonchi or rales  Abdominal:      General: Abdomen is flat  Bowel sounds are normal  There is no distension  Palpations: Abdomen is soft  Tenderness: There is no abdominal tenderness  Musculoskeletal:      Cervical back: Normal range of motion  Right lower leg: No edema  Left lower leg: No edema  Comments: S/p left hip hemiarthoplasty; Dressing intact  Mild drainage proximally  Hip abduction brace in place  Skin:     General: Skin is warm and dry  Capillary Refill: Capillary refill takes less than 2 seconds  Neurological:      Mental Status: She is alert  Mental status is at baseline            Additional Data:     Labs:  Results from last 7 days   Lab Units 07/12/21  0557 07/11/21  0919   WBC Thousand/uL 9 78 7 10   HEMOGLOBIN g/dL 7 8* 9 3*   HEMATOCRIT % 25 6* 30 3*   PLATELETS Thousands/uL 175 181   NEUTROS PCT %  --  79*   LYMPHS PCT %  --  14   MONOS PCT %  --  5   EOS PCT %  --  1     Results from last 7 days   Lab Units 07/12/21  0557 07/11/21  0919   SODIUM mmol/L 136 139   POTASSIUM mmol/L 3 6 3 8   CHLORIDE mmol/L 102 100   CO2 mmol/L 25 31   BUN mg/dL 11 13   CREATININE mg/dL 0 95 1  17   ANION GAP mmol/L 9 8   CALCIUM mg/dL 7 6* 8 9   ALBUMIN g/dL  --  4 1   TOTAL BILIRUBIN mg/dL  --  0 80   ALK PHOS U/L  --  105   ALT U/L  --  22   AST U/L  --  23   GLUCOSE RANDOM mg/dL 124 99     Results from last 7 days   Lab Units 07/11/21  0919   INR  1 01     Results from last 7 days   Lab Units 07/12/21  1246   POC GLUCOSE mg/dl 152*               Lines/Drains:  Invasive Devices     Peripheral Intravenous Line            Peripheral IV 07/11/21 Right Antecubital 1 day          Drain            External Urinary Catheter -- days                      Imaging: No pertinent imaging reviewed      Recent Cultures (last 7 days):         Last 24 Hours Medication List:   Current Facility-Administered Medications   Medication Dose Route Frequency Provider Last Rate    acetaminophen  975 mg Oral Q8H [facility] Delvis Jimenez MD      ALPRAZolam  0 25 mg Oral BID PRN Cristina Spatz, MD      docusate sodium  100 mg Oral BID Cristina Spatz, MD      enoxaparin  30 mg Subcutaneous Daily Cristina Spatz, MD      furosemide  20 mg Intravenous Once [facility], PAGERMAINE      hydrALAZINE  10 mg Intravenous Q6H PRN Cristina Spatz, MD      HYDROmorphone  0 2 mg Intravenous Q4H PRN Cristina Spatz, MD      latanoprost  1 drop Both Eyes HS Delvis Jimenez MD      lidocaine  1 patch Topical Daily Cristina Spatz, MD      metoprolol tartrate  50 mg Oral BID Cristina Spatz, MD      oxyCODONE  2 5 mg Oral Q4H PRN Cristina Spatz, MD      oxyCODONE  5 mg Oral Q4H PRN Cristina Spatz, MD      pantoprazole  20 mg Oral Daily Before Breakfast Cristina Spatz, MD      pravastatin  40 mg Oral Daily Cristina Spatz, MD      rasagiline  1 mg Oral Daily Cristina Spatz, MD      senna  1 tablet Oral Daily Cristina Spatz, MD      timolol  1 drop Both Eyes BID Cristina Spatz, MD          Today, Patient Was Seen By: Portillo Jang Payal Perry PA-C    **Please Note: This note may have been constructed using a voice recognition system  **

## 2021-07-12 NOTE — CASE MANAGEMENT
LOS: 1 DAY  Bundle: No  Unplanned Readmission Score: 12 AND GREEN  30 Day Readmission: No     CM met with patient at bedside  Explained the role of CM  Obtained the following information from patient  Home: 3 SH, 2-3 DESTINY  Lives With: Spouse Gabrielle Vidal), who is step-dad to [facility] children  ADL's: Pt states Annika Gil helps her with most ADLs  DME: Pt reports owning no DME, using no DME  Ambulation: Pt reports ambulating without assistive device  Transportation: Spouse drives; pt states she has not been driving for 1 month  Pharmacy: [facility]; Pt states she has Rx plan, and she can afford Rx  PCP: Saurav Landry  [facility] Hx: Pt reports no Hx  Rehab Hx: Pt reports no Hx  Mental Health Hx: Pt reports no Hx  Substance Abuse Hx: Pt reports no Hx  Employment: Pt states she is retired  POA/LW/AD: Pt reports she does not think she has POA/LW/AD; dtr Krishan Bartolomenegin) agreed with this  Transport at D/C: TBD  CM reviewed d/c planning process including the following: identifying help at home, patient preferences for d/c planning needs, availability of treatment team to discuss questions or concerns patient and/or family may have regarding understanding medications and recognizing signs and symptoms once discharged  CM offered to call Pt's spouse Gabrielle Vidal), but Malachi's phone# is not listed on face sheet, and Pt did not know what her home phone# was  Pt agreed to this CM calling Pt's daughter Krishan Pinedo)  Call to Melva Andersen; introduced self and role of CM  Melva Andersen states her mother will not be able to return home, and Melva Andersen believes Pt will need STR at DC  CM discussed options for STR, and Melva Andersen plans to speak to her siblings and says she will call CM back with their top 3 choices  PT/OT to eval  CM dept will continue to follow to DC

## 2021-07-12 NOTE — PHYSICAL THERAPY NOTE
PHYSICAL THERAPY Evaluation    Physical Therapy Evaluation      Patient Active Problem List   Diagnosis    Abdominal aortic atherosclerosis ([facility] )    Ambulatory dysfunction    Anemia due to poor nutrition    Bicipital tendinitis of left shoulder    Cataract, left    Chronic anxiety    Chronic constipation    Gastroesophageal reflux disease    Glaucoma    Hypertension    Microcytic hypochromic anemia    Low back pain    Lumbosacral spinal stenosis    Osteopenia    Parkinson disease (HCC)    Post-viral cough syndrome    Lumbar spondylosis    Chronic pain syndrome    Need for immunization against influenza    Disorder of intervertebral disc    Essential tremor    Hyperlipidemia    Lumbar radiculopathy    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 [email or web address]    [facility] displaced [facility] femoral [facility] ([facility]    Hypertensive urgency       Past Medical History:   Diagnosis Date    Constipation     Hyperlipidemia     Hypertension     Parkinson disease ([facility] )        Past Surgical History:   Procedure Laterality Date     SECTION      CHOLECYSTECTOMY            21 1104   PT Last Visit   PT Visit Date 21   Note Type   Note type Evaluation   Pain Assessment   Pain Assessment Tool 0-10   Pain Score No Pain  (no pain at rest, pain with movement; flat affect )   Home Living   Type of [address] [place] [address] Multi-level;1/2 bath on main level;Bed/bath upstairs  (3STE)   Home Equipment Other (Comment)  (none)   Additional Comments  assists with all ADLs, pt was supervision level for mobility without AD   Prior Function   Level of [place] Needs assistance with ADLs and functional mobility   Lives With Significant other   Receives Help From   ()   ADL Assistance Needs assistance   IADLs Needs assistance   Falls in the last 6 months 1 to 4   Restrictions/Precautions   Weight Bearing Precautions Per Order Yes   LLE Weight Bearing Per Order WBAT   Other Precautions Pain; Fall Risk; Chair Alarm  (posterior hip precautions)   General   Additional Pertinent History Hx Parkinson's disease, flat affect, tremors at rest and with intention in BUE and BLE  Cognition   Overall Cognitive Status WFL   Arousal/Participation Cooperative   Orientation Level Oriented X4   Memory Within functional limits   Following Commands Follows one step commands with increased time or repetition   RUE Assessment   RUE Assessment WFL   LUE Assessment   LUE Assessment WFL   RLE Assessment   RLE Assessment WFL   LLE Assessment   LLE Assessment   (WFL within THP)   Coordination   Movements are Fluid and Coordinated   (tremors noted in BUE and BLE at rest and with intention)   Bed Mobility   Supine to Sit 3  Moderate assistance   Additional items Assist x 2;HOB elevated; Bedrails;Verbal cues;LE management; Increased time required   Transfers   Sit to Stand 3  Moderate assistance   Additional items Assist x 2; Increased time required;Verbal cues   Stand to Sit 4  Minimal assistance   Additional items Assist x 2   Stand pivot 4  Minimal assistance   Additional items Assist x 2  (RW)   Ambulation/Elevation   Gait pattern Excessively slow; Short stride;Decreased R stance;Decreased L stance;Decreased foot clearance; Forward Flexion; Festenating   Gait Assistance 3  Moderate assist   Additional items Assist x 2   Assistive Device Rolling walker   Distance 2ft with RW, pt c/o pain in LLE and fear of falling;  mod x 2 to step to chair at bedside, poor foot clearance and small festinating steps  Balance   Static Sitting Fair   Dynamic Sitting Fair   Static Standing Poor +   Dynamic Standing Poor +   Activity Tolerance   Activity Tolerance Patient limited by pain   Nurse Made Aware Sheryl   Assessment   Prognosis Guarded   Problem List Decreased range of motion;Decreased strength;Decreased endurance; Impaired balance;Decreased mobility; Decreased coordination;Decreased skin integrity;Orthopedic restrictions;Pain   Assessment Pt is a 65 y/o female who presented to ED with c/o L hip pain after fall OOB; Hx parkinson's disease  Dx L hip hemiarthroplasty; hip abduction pillow while in bed, posterior THP, WBAT  Pt currently presents with resting and intention tremors in BUE and BLE;  Flat affect, pain noted in LLE with mobility, requires physical assist x 2 for bed mobility, transfers and stepping to chair at bedside; Not safe and unable to demo household mobility; Decreased AROM and pain with mobility  Pt has a complex medical history and current deficits and diagnosis make this a high complexity PT IE and pt is not safe for home D/C  Pt would benefit from continued PT while in hospital and follow up at inpt rehab at D/C to increase strength, balance, endurance, independence with funcitonal mobility to return to PLOF  The patient's AM-PAC Basic Mobility Inpatient Short Form Raw Score is 11, Standardized Score is 30 25  A standardized score less than 42 9 suggests the patient may benefit from discharge to post-acute rehabilitation services  Please also refer to the recommendation of the Physical Therapist for safe discharge planning  Barriers to Discharge Inaccessible home environment;Decreased caregiver support   Goals   Patient Goals to go home   STG Expiration Date 07/30/21   Short Term Goal #1 1  S for sit to/from supine, THP;  2   S for sit to/from standing with RW, THP;  3   S to ambulate 50ft with RW;  4   S for ascend/descend 3 steps with handrail  Plan   Treatment/Interventions Functional transfer training;ADL retraining;LE strengthening/ROM; Elevations; Therapeutic exercise; Endurance training;Equipment eval/education;Patient/family training;Gait training;Bed mobility; Compensatory technique education;Continued evaluation;Spoke to nursing;OT;Spoke to case management   PT Frequency 5x/wk   Recommendation   PT Discharge Recommendation Post acute rehabilitation services   PT - OK to Discharge Yes  (When medically ready and bed available)   AM-PAC Basic Mobility Inpatient   Turning in Bed Without Bedrails 2   Lying on Back to Sitting on Edge of Flat Bed 2   Moving Bed to Chair 2   Standing Up From Chair 2   Walk in Room 2   Climb 3-5 Stairs 1   Basic Mobility Inpatient Raw Score 11   Basic Mobility Standardized Score 30 25       Naye Brown, PT        Patient Name: Xavier Steinberg  ZQUHD'[id number] Date: 7/12/2021

## 2021-07-12 NOTE — ASSESSMENT & PLAN NOTE
Patient had microcytic anemia on admission  Iron studies are not consistent with iron deficiency      Patient denies any signs of GI or  bleeding    Will monitor hemoglobin closely

## 2021-07-12 NOTE — ASSESSMENT & PLAN NOTE
· Microcytic hypochromic anemia with anisocytosis, hemoglobin on presentation is 9 3, 4 years ago was 11 0  · Monitor daily- hemoglobin today POD#1 is 7 8   · Per ortho- recommend blood transfusion as hgb is likely to continue to drop which is most commonly seen POD#2   · Will give 1 unit of PRBC today   · Check iron studies

## 2021-07-12 NOTE — ASSESSMENT & PLAN NOTE
Noted   According to , a cane was recommended but patient ambulating without   Needs PT/OT post surgery: currently recommending post-acute rehab

## 2021-07-12 NOTE — ASSESSMENT & PLAN NOTE
Due to a mechanical fall as seen on xray   Pain control as ordered   DVT prophylaxis chemical and mechanical post surgery  PT/OT: recommending post-acute rehab   D/C IVF hydration as patient is currently tolerating regular diet    · Ortho consulted appreciate input   · Pt is currently POD#1 s/p left hip hemiarthroplasty   · WBAT to LLE with assistance   · Continue to monitor H&H and vitals  · Monitor for blood loss- hgb currently 7 8 was 9 3 yesterday- ortho recommending blood transfusion    · Will obtain blood consent and placed order for 1 unit of PRBC with 1 dose of IV lasix 20mg

## 2021-07-12 NOTE — PLAN OF CARE
Problem: PHYSICAL THERAPY ADULT  Goal: Performs mobility at highest level of function for planned discharge setting  See evaluation for individualized goals  Description: Treatment/Interventions: Functional transfer training, ADL retraining, LE strengthening/ROM, Elevations, Therapeutic exercise, Endurance training, Equipment eval/education, Patient/family training, Gait training, Bed mobility, Compensatory technique education, Continued evaluation, Spoke to nursing, OT, Spoke to case management          See flowsheet documentation for full assessment, interventions and recommendations  Note: Prognosis: Guarded  Problem List: Decreased range of motion, Decreased strength, Decreased endurance, Impaired balance, Decreased mobility, Decreased coordination, Decreased skin integrity, Orthopedic restrictions, Pain  Assessment: Pt is a 63 y/o female who presented to ED with c/o L hip pain after fall OOB; Hx parkinson's disease  Dx L hip hemiarthroplasty; hip abduction pillow while in bed, posterior THP, WBAT  Pt currently presents with resting and intention tremors in BUE and BLE;  Flat affect, pain noted in LLE with mobility, requires physical assist x 2 for bed mobility, transfers and stepping to chair at bedside; Not safe and unable to demo household mobility; Decreased AROM and pain with mobility  Pt has a complex medical history and current deficits and diagnosis make this a high complexity PT IE and pt is not safe for home D/C  Pt would benefit from continued PT while in hospital and follow up at inpt rehab at D/C to increase strength, balance, endurance, independence with funcitonal mobility to return to PLOF  The patient's AM-PAC Basic Mobility Inpatient Short Form Raw Score is 11, Standardized Score is 30 25  A standardized score less than 42 9 suggests the patient may benefit from discharge to post-acute rehabilitation services   Please also refer to the recommendation of the Physical Therapist for safe discharge planning  Barriers to Discharge: Inaccessible home environment, Decreased caregiver support        PT Discharge Recommendation: Post acute rehabilitation services     PT - OK to Discharge: Yes (When medically ready and bed available)    See flowsheet documentation for full assessment

## 2021-07-12 NOTE — CASE MANAGEMENT
PT/OT recommending STR  Call to Pt's daughter Yuri Houser, [phone]), informed her of recommendation  Daughter states she discussed this with family and their choices for STR are: [facility], [facility], 3rd choice-[facility]; referrals sent via [facility] [address]  Await response  CM dept will continue to follow

## 2021-07-12 NOTE — PLAN OF CARE
Problem: OCCUPATIONAL THERAPY ADULT  Goal: Performs self-care activities at highest level of function for planned discharge setting  See evaluation for individualized goals  Description: Treatment Interventions: ADL retraining, Functional transfer training, Patient/family training, Compensatory technique education, Neuromuscular reeducation, Equipment evaluation/education          See flowsheet documentation for full assessment, interventions and recommendations  7/12/2021 1542 by Daniel Ahston OT  Note:       Assessment: Pt is a 64 y o  female seen for OT evaluation at [facility], admitted 7/11/2021 w/ Closed displaced fracture of left femoral neck ([address] )  Pt now s/p left CANDELARIA (posterior approach)  OT completed extensive review of pt's medical and social history  Comorbidities affecting pt's functional performance at time of assessment include: ambulatory dysfunction, Parkinson's disease, chronic anxiety, etc (see chart for additional hx)  Personal factors affecting pt at time of IE include:difficulty performing ADLS, difficulty performing IADLS  and decreased functional mobility  Pt with active OT orders  Prior to admission, pt was living with spouse in [facility]  Pt required assist with ADLS and IADLS & required use of a walker PTA  Upon evaluation: Pt requires mod Ax 2 bed mobility, min-mod Ax 2 for functional mobility/transfers, supervision-min A for UB ADLs and mod-max A x 2 for LB ADLS 2* the following deficits impacting occupational performance: weakness, decreased strength, decreased balance and impaired GMC  Pt to benefit from continued skilled OT tx while in the hospital to address deficits as defined above and maximize level of functional independence w ADL's and functional mobility  Occupational Performance areas to address include: bathing/shower, toilet hygiene, dressing and functional mobility  Based on findings, pt is of high complexity   The patient's raw score on the AM-PAC Daily Activity inpatient short form is 17, standardized score is 37 26, less than 39 4  Patients at this level are likely to benefit from DC to post-acute rehabilitation services  Please refer to the recommendation of the Occupational Therapist for safe DC planning  At this time, OT recommendations at time of discharge are short term rehab  OT Discharge Recommendation: Post acute rehabilitation services       7/12/2021 1541 by Yvonne Loyola OT  Note:       Assessment: Pt is a 64 y o  female seen for OT evaluation at [facility], admitted 7/11/2021 w/ Closed displaced fracture of left femoral neck ([address] )  Pt now s/p left CANDELARIA (posterior approach)  OT completed extensive review of pt's medical and social history  Comorbidities affecting pt's functional performance at time of assessment include: ambulatory dysfunction, Parkinson's disease, chronic anxiety, etc (see chart for additional hx)  Personal factors affecting pt at time of IE include:difficulty performing ADLS, difficulty performing IADLS  and decreased functional mobility  Pt with active OT orders  Prior to admission, pt was living with spouse in [facility]  Pt required assist with ADLS and IADLS & required use of a walker PTA  Upon evaluation: Pt requires mod Ax 2 bed mobility, min-mod Ax 2 for functional mobility/transfers, supervision-min A for UB ADLs and mod-max A x 2 for LB ADLS 2* the following deficits impacting occupational performance: weakness, decreased strength, decreased balance and impaired GMC  Pt to benefit from continued skilled OT tx while in the hospital to address deficits as defined above and maximize level of functional independence w ADL's and functional mobility  Occupational Performance areas to address include: bathing/shower, toilet hygiene, dressing and functional mobility  Based on findings, pt is of high complexity   The patient's raw score on the AM-PAC Daily Activity inpatient short form is 17, standardized score is 37 26, less than 39 4  Patients at this level are likely to benefit from DC to post-acute rehabilitation services  Please refer to the recommendation of the Occupational Therapist for safe DC planning  At this time, OT recommendations at time of discharge are short term rehab       OT Discharge Recommendation: Post acute rehabilitation services

## 2021-07-12 NOTE — ASSESSMENT & PLAN NOTE
Patient developed acute blood loss anemia due to left femoral neck fracture and surgery  Her hemoglobin dropped from 9 3 to 7 8  Will recheck hemoglobin stat and see if she needs blood transfusion to which she consented      She does have subcutaneous ecchymosis around the left hip incision and evidence of bleeding through the dressing

## 2021-07-12 NOTE — PROGRESS NOTES
[place]  Progress Note - Romie Bhatia 1945, 64 y o  female MRN: [id number]  Unit/Bed#: -01 Encounter: [id number]  Primary Care Provider: Shahriar Grajeda DO   Date and time admitted to hospital: 7/11/2021  8:53 AM    * Closed displaced fracture of left femoral neck [facility])  Assessment & Plan  Patient had a mechanical fall and suffered a left femoral neck fracture  She underwent surgery by Ortho on a July 11  Patient is stable postop  Physical Occupational therapy recommended SNF rehab    Continue Lovenox for DVT prevention    Continue p r n  Oxycodone for pain control    I discussed the case with patient's daughter at the bedside in detail    Acute blood loss anemia  Assessment & Plan  Patient developed acute blood loss anemia due to left femoral neck fracture and surgery  Her hemoglobin dropped from 9 3 to 7 8  Will recheck hemoglobin stat and see if she needs blood transfusion to which she consented  She does have subcutaneous ecchymosis around the left hip incision and evidence of bleeding through the dressing    Microcytic hypochromic anemia  Assessment & Plan  Patient had microcytic anemia on admission  Iron studies are not consistent with iron deficiency  Patient denies any signs of GI or  bleeding    Will monitor hemoglobin closely    Parkinson disease [facility])  Assessment & Plan  Patient has Parkinson's disease with gait dysfunction  She was not using a cane or walker at home  She denies dysphagia    Continue Azilect    Essential hypertension  Assessment & Plan  Patient has chronic hypertension and she had hypertensive urgency on admission with systolic blood pressure of 223    Blood pressure is 701 systolic this afternoon, controlled      Continue metoprolol        VTE Prophylaxis: in place    Patient Centered Rounds: I rounded with patient's nurse    Current Length of Stay: 1 day(s)    Current Patient Status: Inpatient    Certification Statement: Pt requires additional inpatient hospital stay due to: see assessment and plan        Subjective:   Patient denies left hip pain after surgery  She denies any chest pain, shortness of breath, abdominal pain, dysuria, signs of GI or  bleeding  She denies any weakness or numbness of the left foot    All other ROS are negative    Objective:     Vitals:   Temp (24hrs), Av 3 °F (36 8 °C), Min:98 °F (36 7 °C), Max:98 6 °F (37 °C)    Temp:  [98 °F (36 7 °C)-98 6 °F (37 °C)] 98 6 °F (37 °C)  HR:  [62-96] 64  Resp:  [16-18] 18  BP: (130-171)/(61-84) 130/65  SpO2:  [94 %-97 %] 96 %  Body mass index is 21 02 kg/m²  Input and Output Summary (last 24 hours): Intake/Output Summary (Last 24 hours) at 2021 1811  Last data filed at 2021 1650  Gross per 24 hour   Intake 3314 59 ml   Output 500 ml   Net 2814 59 ml       Physical Exam:     Physical Exam  HENT:      Head: Normocephalic  Mouth/Throat:      Mouth: Mucous membranes are moist    Eyes:      Conjunctiva/sclera: Conjunctivae normal    Cardiovascular:      Rate and Rhythm: Normal rate and regular rhythm  Heart sounds: No murmur heard  Comments: Left foot dorsalis pedis pulses 2+, normal  Pulmonary:      Effort: No respiratory distress  Breath sounds: No wheezing or rales  Abdominal:      General: Bowel sounds are normal       Palpations: Abdomen is soft  Tenderness: There is no abdominal tenderness  Musculoskeletal:         General: Tenderness (Left hip tenderness is present) present  Cervical back: Neck supple  Skin:     General: Skin is warm and dry  Comments: She has subcutaneous ecchymosis on the left hip surgical site with bleeding through the dressing   Neurological:      Mental Status: She is alert and oriented to person, place, and time  Cranial Nerves: No cranial nerve deficit  Comments: She moves both feet on command, sensation to light touch is intact of the left foot      She has the bradykinesia, slight left hand resting tremor   Psychiatric:         Mood and Affect: Mood normal              I personally reviewed labs and imaging reports for today  Last 24 Hours Medication List:   Current Facility-Administered Medications   Medication Dose Route Frequency Provider Last Rate    acetaminophen  975 mg Oral Q8H Huma Monzon MD      ALPRAZolam  0 25 mg Oral BID PRN Tony Morel MD      docusate sodium  100 mg Oral BID Tony Morel MD      enoxaparin  30 mg Subcutaneous Daily Tony Morel MD      hydrALAZINE  10 mg Intravenous Q6H PRN Tony Morel MD      HYDROmorphone  0 2 mg Intravenous Q4H PRN Tony Morel MD      latanoprost  1 drop Both Eyes HS Delvis Bishop MD      lidocaine  1 patch Topical Daily Tony Morel MD      metoprolol tartrate  50 mg Oral BID Tony Morel MD      oxyCODONE  2 5 mg Oral Q4H PRN Tony Morel MD      oxyCODONE  5 mg Oral Q4H PRN Tony Morel MD      pantoprazole  20 mg Oral Daily Before Amy Parsons MD      pravastatin  40 mg Oral Daily Tony Morel MD      rasagiline  1 mg Oral Daily Tony Morel MD      senna  1 tablet Oral Daily Tony Morel MD      timolol  1 drop Both Eyes BID Tony Morel MD            Today, Patient Was Seen By: Andrés Mckay MD    ** Please Note: Dictation voice to text software may have been used in the creation of this document   **

## 2021-07-12 NOTE — PROGRESS NOTES
Rosa Corral  64 y o   female  MR#: [id number]  7/12/2021    Post-op days: 1  Extremity: left hip    Subjective: Patient seen and examined  Lying comfortably in bed  No issues overnight  Denies any pain at this time  No fever, chills or sweats  Vitals:   Vitals:    07/12/21 0700   BP: 143/84   Pulse: 67   Resp: 16   Temp: 98 5 °F (36 9 °C)   SpO2: 97%       Exam:   A&O x 3 NAD  Left hip:  Mepilex dressing intact  Mild drainage proximally  Hip abduction brace in place  Thigh and calf compartments soft, compressible  NV intact    X-rays:  Pelvis: well aligned prosthesis with no evidence of loosening    Labs:   WBC   Recent Labs     07/11/21  0919 07/12/21  0557   WBC 7 10 9 78     H/H   Recent Labs     07/11/21  0919 07/12/21  0557   HGB 9 3* 7 8*   /  Recent Labs     07/11/21  0919 07/12/21  0557   HCT 30 3* 25 6*     Sed Rate No results for input(s): SEDRATE in the last 72 hours  CRP No results for input(s): CRP in the last 72 hours      Assessment:   S/p left hip hemiarthroplasty    Plan:   WBAT to LLE with assistance  PT/OT- posterior hip precautions  DVT prophylaxis- Lovenox, mechanical  Pain control  Encourage incentive spirometry  ABLA: Hbg 7 8 this AM  Transfuse as per primary team  Continue to monitor vitals and H/H   DC planning

## 2021-07-12 NOTE — ASSESSMENT & PLAN NOTE
· Present on admission as evidenced by systolic blood pressure greater than 200, likely due to pain, tremors from PD- BP have improved currently 130/65  · Was treated with amlodipine 5 mg once  · Hydralazine prn for BP > 170 mmHg every 6 hours prn

## 2021-07-12 NOTE — OCCUPATIONAL THERAPY NOTE
Occupational Therapy Evaluation     Patient Name: Trudi Rosales  XUELC'X Date: 2021  Problem List  Principal Problem:    Closed displaced fracture of left femoral neck ([facility] [address] )  Active Problems:    Ambulatory dysfunction    Microcytic hypochromic anemia    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 [facility])    Hypertensive urgency    Past Medical History  Past Medical History:   Diagnosis Date    Constipation     Hyperlipidemia     Hypertension     [facility]      Past Surgical History  Past Surgical History:   Procedure Laterality Date     SECTION      CHOLECYSTECTOMY           21 1101   OT Last Visit   OT Visit Date 21   Note Type   Note type Evaluation   Restrictions/Precautions   Weight Bearing Precautions Per Order Yes   LLE Weight Bearing Per Order WBAT   Other Precautions Fall Risk;Cognitive; Chair Alarm; Bed Alarm;THR  (Posterior dislocation precautions)   Pain Assessment   Pain Assessment Tool Wheeler-Baker FACES   Wheeler-Baker FACES Pain Rating 6   Pain Location/Orientation Orientation: Left; Location: Leg   Effect of Pain on Daily Activities transfers, movement   Patient's Stated Pain Goal No pain   Home Living   Type of [address] [place] [address] Multi-level;1/2 bath on main level;Bed/bath upstairs   Bathroom Shower/Tub Tub/shower unit   Bathroom Toilet Standard   Bathroom Accessibility Accessible   Home Equipment   (Pt denies DME)   Prior Function   Level of [place] Needs assistance with ADLs and functional mobility   Lives With Spouse   Receives Help From Family   ADL Assistance Needs assistance   IADLs Needs assistance   Falls in the last 6 months 1 to 4   Subjective   Subjective Pt received in semisupine position  + Flat affect  Pt agreeable to session      ADL   Eating Assistance 5  Supervision/Setup   Eating Deficit Setup   Grooming Assistance 5  Supervision/Setup   Grooming Deficit Setup   UB Bathing Assistance 4  Minimal Assistance   LB Bathing Assistance 3 Moderate Assistance   UB Dressing Assistance 4  Minimal Assistance   LB Dressing Assistance 2  Maximal Assistance   Toileting Assistance  3  Moderate Assistance   Bed Mobility   Supine to Sit 3  Moderate assistance   Additional items Assist x 2   Transfers   Sit to Stand 3  Moderate assistance   Additional items Assist x 2;Verbal cues   Stand to Sit 4  Minimal assistance   Additional items Assist x 2;Verbal cues   Stand pivot 4  Minimal assistance   Additional items Assist x 2;Verbal cues  (RW)   Balance   Static Sitting Fair +   Dynamic Sitting Fair   Static Standing Poor +   Dynamic Standing Poor +   Activity Tolerance   Activity Tolerance Patient limited by pain   Medical Staff Made Aware PT Paige   Nurse Made Aware CHRIS Saucedo   RUAMMY Assessment   RUE Assessment WFL   LUE Assessment   LUE Assessment WFL   Hand Function   Gross Motor Coordination   (+ Tremors)   Cognition   Overall Cognitive Status WFL   Arousal/Participation Alert   Attention Within functional limits   Memory Within functional limits   Following Commands Follows one step commands with increased time or repetition   Comments + flat affect   Assessment   Assessment Pt is a 64 y o  female seen for OT evaluation at [address] [place] [address], admitted 7/11/2021 w/ Closed displaced fracture of left femoral neck ([address] )  Pt now s/p left CANDELARIA (posterior approach)  OT completed extensive review of pt's medical and social history  Comorbidities affecting pt's functional performance at time of assessment include: ambulatory dysfunction, Parkinson's disease, chronic anxiety, etc (see chart for additional hx)  Personal factors affecting pt at time of IE include:difficulty performing ADLS, difficulty performing IADLS  and decreased functional mobility  Pt with active OT orders  Prior to admission, pt was living with spouse in [facility]  Pt required assist with ADLS and IADLS & required use of a walker PTA   Upon evaluation: Pt requires mod Ax 2 bed mobility, min-mod Ax 2 for functional mobility/transfers, supervision-min A for UB ADLs and mod-max A x 2 for LB ADLS 2* the following deficits impacting occupational performance: weakness, decreased strength, decreased balance and impaired GMC  Pt to benefit from continued skilled OT tx while in the hospital to address deficits as defined above and maximize level of functional independence w ADL's and functional mobility  Occupational Performance areas to address include: bathing/shower, toilet hygiene, dressing and functional mobility  Based on findings, pt is of high complexity  The patient's raw score on the AM-PAC Daily Activity inpatient short form is 17, standardized score is 37 26, less than 39 4  Patients at this level are likely to benefit from DC to post-acute rehabilitation services  Please refer to the recommendation of the Occupational Therapist for safe DC planning  At this time, OT recommendations at time of discharge are short term rehab  Goals   Patient Goals Pt wishes to get better   Plan   Treatment Interventions ADL retraining;Functional transfer training;Patient/family training; Compensatory technique education; Neuromuscular reeducation;Equipment evaluation/education   Goal Expiration Date 07/22/21   OT Treatment Day 0   OT Frequency 3-5x/wk   Recommendation   OT Discharge Recommendation Post acute rehabilitation services   [facility] Daily Activity Inpatient   Lower Body Dressing 2   Bathing 2   Toileting 2   Upper Body Dressing 3   Grooming 4   Eating 4   Daily Activity Raw Score 17   Daily Activity Standardized Score (Calc for Raw Score >=11) 37 26   AM-[facility] Applied Cognition Inpatient   Following a Speech/Presentation 3   Understanding Ordinary Conversation 3   Taking Medications 3   Remembering Where Things Are Placed or Put Away 3   Remembering List of 4-5 Errands 3   Taking Care of Complicated Tasks 3   Applied Cognition Raw Score 18   Applied Cognition Standardized Score 38 07         Pt will achieve the following goals within 10 days  *Pt will complete UB bathing and dressing with supervision  *Pt will complete LB bathing and dressing with min  A     *Pt will perform functional transfers with on/off all surfaces with supervision using DME as needed w/ G balance/safety  *Pt will tolerate standing for 2 min to perform pericare  *Pt will participate in UE therapeutic exercise in order to maximize strength for ADL transfers  *Pt will identify 3-5 fall risks during ADL routine to ensure home safety upon discharge      *Assess DME needs   BOBBY Gonzalez/L

## 2021-07-12 NOTE — ASSESSMENT & PLAN NOTE
Patient has Parkinson's disease with gait dysfunction  She was not using a cane or walker at home      She denies dysphagia    Continue Azilect

## 2021-07-13 PROBLEM — E87.6 HYPOKALEMIA: Status: ACTIVE | Noted: 2021-07-13

## 2021-07-13 LAB
ANION GAP SERPL CALCULATED.3IONS-SCNC: 9 MMOL/L (ref 4–13)
BASOPHILS # BLD AUTO: 0.01 THOUSANDS/ΜL (ref 0–0.1)
BASOPHILS # BLD AUTO: 0.01 THOUSANDS/ΜL (ref 0–0.1)
BASOPHILS NFR BLD AUTO: 0 % (ref 0–1)
BASOPHILS NFR BLD AUTO: 0 % (ref 0–1)
BUN SERPL-MCNC: 11 MG/DL (ref 5–25)
CALCIUM SERPL-MCNC: 8.1 MG/DL (ref 8.3–10.1)
CHLORIDE SERPL-SCNC: 103 MMOL/L (ref 100–108)
CO2 SERPL-SCNC: 25 MMOL/L (ref 21–32)
CREAT SERPL-MCNC: 0.97 MG/DL (ref 0.6–1.3)
EOSINOPHIL # BLD AUTO: 0.05 THOUSAND/ΜL (ref 0–0.61)
EOSINOPHIL # BLD AUTO: 0.08 THOUSAND/ΜL (ref 0–0.61)
EOSINOPHIL NFR BLD AUTO: 1 % (ref 0–6)
EOSINOPHIL NFR BLD AUTO: 1 % (ref 0–6)
ERYTHROCYTE [DISTWIDTH] IN BLOOD BY AUTOMATED COUNT: 18.6 % (ref 11.6–15.1)
ERYTHROCYTE [DISTWIDTH] IN BLOOD BY AUTOMATED COUNT: 19.7 % (ref 11.6–15.1)
GFR SERPL CREATININE-BSD FRML MDRD: 56 ML/MIN/1.73SQ M
GLUCOSE SERPL-MCNC: 107 MG/DL (ref 65–140)
HCT VFR BLD AUTO: 25.5 % (ref 34.8–46.1)
HCT VFR BLD AUTO: 26.8 % (ref 34.8–46.1)
HGB BLD-MCNC: 8.2 G/DL (ref 11.5–15.4)
HGB BLD-MCNC: 8.6 G/DL (ref 11.5–15.4)
IMM GRANULOCYTES # BLD AUTO: 0.01 THOUSAND/UL (ref 0–0.2)
IMM GRANULOCYTES # BLD AUTO: 0.04 THOUSAND/UL (ref 0–0.2)
IMM GRANULOCYTES NFR BLD AUTO: 0 % (ref 0–2)
IMM GRANULOCYTES NFR BLD AUTO: 1 % (ref 0–2)
LYMPHOCYTES # BLD AUTO: 1.09 THOUSANDS/ΜL (ref 0.6–4.47)
LYMPHOCYTES # BLD AUTO: 1.31 THOUSANDS/ΜL (ref 0.6–4.47)
LYMPHOCYTES NFR BLD AUTO: 14 % (ref 14–44)
LYMPHOCYTES NFR BLD AUTO: 17 % (ref 14–44)
MCH RBC QN AUTO: 22.7 PG (ref 26.8–34.3)
MCH RBC QN AUTO: 22.8 PG (ref 26.8–34.3)
MCHC RBC AUTO-ENTMCNC: 32.1 G/DL (ref 31.4–37.4)
MCHC RBC AUTO-ENTMCNC: 32.2 G/DL (ref 31.4–37.4)
MCV RBC AUTO: 71 FL (ref 82–98)
MCV RBC AUTO: 71 FL (ref 82–98)
MONOCYTES # BLD AUTO: 0.55 THOUSAND/ΜL (ref 0.17–1.22)
MONOCYTES # BLD AUTO: 0.55 THOUSAND/ΜL (ref 0.17–1.22)
MONOCYTES NFR BLD AUTO: 7 % (ref 4–12)
MONOCYTES NFR BLD AUTO: 7 % (ref 4–12)
NEUTROPHILS # BLD AUTO: 5.95 THOUSANDS/ΜL (ref 1.85–7.62)
NEUTROPHILS # BLD AUTO: 6.26 THOUSANDS/ΜL (ref 1.85–7.62)
NEUTS SEG NFR BLD AUTO: 75 % (ref 43–75)
NEUTS SEG NFR BLD AUTO: 77 % (ref 43–75)
NRBC BLD AUTO-RTO: 0 /100 WBCS
PLATELET # BLD AUTO: 156 THOUSANDS/UL (ref 149–390)
PLATELET # BLD AUTO: 169 THOUSANDS/UL (ref 149–390)
PMV BLD AUTO: 10.9 FL (ref 8.9–12.7)
PMV BLD AUTO: 9.9 FL (ref 8.9–12.7)
POTASSIUM SERPL-SCNC: 3.2 MMOL/L (ref 3.5–5.3)
RBC # BLD AUTO: 3.59 MILLION/UL (ref 3.81–5.12)
RBC # BLD AUTO: 3.79 MILLION/UL (ref 3.81–5.12)
SODIUM SERPL-SCNC: 137 MMOL/L (ref 136–145)
WBC # BLD AUTO: 7.88 THOUSAND/UL (ref 4.31–10.16)
WBC # BLD AUTO: 8.03 THOUSAND/UL (ref 4.31–10.16)

## 2021-07-13 PROCEDURE — 85025 COMPLETE CBC W/AUTO DIFF WBC: CPT | Performed by: PHYSICIAN ASSISTANT

## 2021-07-13 PROCEDURE — 85025 COMPLETE CBC W/AUTO DIFF WBC: CPT | Performed by: INTERNAL MEDICINE

## 2021-07-13 PROCEDURE — 99024 POSTOP FOLLOW-UP VISIT: CPT | Performed by: PHYSICIAN ASSISTANT

## 2021-07-13 PROCEDURE — 99232 SBSQ HOSP IP/OBS MODERATE 35: CPT | Performed by: INTERNAL MEDICINE

## 2021-07-13 PROCEDURE — 80048 BASIC METABOLIC PNL TOTAL CA: CPT | Performed by: PHYSICIAN ASSISTANT

## 2021-07-13 PROCEDURE — 97530 THERAPEUTIC ACTIVITIES: CPT

## 2021-07-13 RX ORDER — AMLODIPINE BESYLATE 5 MG/1
5 TABLET ORAL ONCE
Status: COMPLETED | OUTPATIENT
Start: 2021-07-13 | End: 2021-07-13

## 2021-07-13 RX ORDER — POTASSIUM CHLORIDE 20 MEQ/1
40 TABLET, EXTENDED RELEASE ORAL ONCE
Status: COMPLETED | OUTPATIENT
Start: 2021-07-13 | End: 2021-07-13

## 2021-07-13 RX ADMIN — ACETAMINOPHEN 975 MG: 325 TABLET, FILM COATED ORAL at 21:30

## 2021-07-13 RX ADMIN — PANTOPRAZOLE SODIUM 20 MG: 20 TABLET, DELAYED RELEASE ORAL at 06:00

## 2021-07-13 RX ADMIN — METOPROLOL TARTRATE 50 MG: 50 TABLET, FILM COATED ORAL at 17:53

## 2021-07-13 RX ADMIN — ENOXAPARIN SODIUM 30 MG: 100 INJECTION SUBCUTANEOUS at 09:07

## 2021-07-13 RX ADMIN — METOPROLOL TARTRATE 50 MG: 50 TABLET, FILM COATED ORAL at 09:08

## 2021-07-13 RX ADMIN — POTASSIUM CHLORIDE 40 MEQ: 1500 TABLET, EXTENDED RELEASE ORAL at 07:40

## 2021-07-13 RX ADMIN — TIMOLOL MALEATE 1 DROP: 5 SOLUTION/ DROPS OPHTHALMIC at 17:51

## 2021-07-13 RX ADMIN — HYDRALAZINE HYDROCHLORIDE 10 MG: 20 INJECTION INTRAMUSCULAR; INTRAVENOUS at 06:00

## 2021-07-13 RX ADMIN — PRAVASTATIN SODIUM 40 MG: 40 TABLET ORAL at 09:08

## 2021-07-13 RX ADMIN — DOCUSATE SODIUM 100 MG: 100 CAPSULE, LIQUID FILLED ORAL at 17:53

## 2021-07-13 RX ADMIN — HYDRALAZINE HYDROCHLORIDE 10 MG: 20 INJECTION INTRAMUSCULAR; INTRAVENOUS at 21:33

## 2021-07-13 RX ADMIN — LATANOPROST 1 DROP: 50 SOLUTION OPHTHALMIC at 21:30

## 2021-07-13 RX ADMIN — SENNOSIDES 8.6 MG: 8.6 TABLET, FILM COATED ORAL at 09:09

## 2021-07-13 RX ADMIN — DOCUSATE SODIUM 100 MG: 100 CAPSULE, LIQUID FILLED ORAL at 09:08

## 2021-07-13 RX ADMIN — RASAGILINE 1 MG: 1 TABLET ORAL at 09:10

## 2021-07-13 RX ADMIN — ACETAMINOPHEN 975 MG: 325 TABLET, FILM COATED ORAL at 14:04

## 2021-07-13 RX ADMIN — AMLODIPINE BESYLATE 5 MG: 5 TABLET ORAL at 09:51

## 2021-07-13 RX ADMIN — TIMOLOL MALEATE 1 DROP: 5 SOLUTION/ DROPS OPHTHALMIC at 09:10

## 2021-07-13 RX ADMIN — ACETAMINOPHEN 975 MG: 325 TABLET, FILM COATED ORAL at 06:00

## 2021-07-13 NOTE — ASSESSMENT & PLAN NOTE
· Due to a mechanical fall as seen on xray   · Pain control as ordered   · DVT prophylaxis chemical and mechanical post surgery  · PT/OT: recommending rehab   · D/C IVF hydration as patient is currently tolerating regular diet  · Hgb has improved to 8 6 this morning from 7 0 yesterday- was given 1 unit of PRBC with 1 dose of IV lasix 20mg  Dressing was changed as it was saturated with sanguinous drainage  Continue to monitor for blood loss        · Ortho consulted appreciate input   · Pt is currently POD#2 s/p left hip hemiarthroplasty   · WBAT to LLE with assistance   · Continue to monitor H&H and vitals  · Incentive spirometry   · Can be discharged once medically stable with ortho f/u with Dr Santiago Moran

## 2021-07-13 NOTE — PROGRESS NOTES
[place]  Progress Note - Gunnar Mead 1945, 64 y o  female MRN: [id number]  Unit/Bed#: -01 Encounter: [id number]  Primary Care Provider: Dane Mclain DO   Date and time admitted to hospital: 7/11/2021  8:53 AM    * Closed displaced fracture of left femoral neck (HCC)  Assessment & Plan  · Due to a mechanical fall as seen on xray   · Pain control as ordered   · DVT prophylaxis chemical and mechanical post surgery  · PT/OT: recommending rehab   · D/C IVF hydration as patient is currently tolerating regular diet  · Hgb has improved to 8 6 this morning from 7 0 yesterday- was given 1 unit of PRBC with 1 dose of IV lasix 20mg  Dressing was changed as it was saturated with sanguinous drainage  Continue to monitor for blood loss        · Ortho consulted appreciate input   · Pt is currently POD#2 s/p left hip hemiarthroplasty   · WBAT to LLE with assistance   · Continue to monitor H&H and vitals  · Incentive spirometry   · Can be discharged once medically stable with ortho f/u with Dr Ger Goode       Hypertensive urgency  Assessment & Plan  · Present on admission as evidenced by systolic blood pressure greater than 200, likely due to pain, tremors from PD- BP have increased overnight again currently 198/86  · Give amlodipine 5 mg once- consider starting patient on daily regimen if BP continues to rise   · Hydralazine prn for BP > 170 mmHg every 6 hours prn      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 ([facility] )  Assessment & Plan  · Present, what for behavior abnormalities given changed environment, need for surgery with anesthesia, pain  · Needs constant reorientation, familiar faces, window blinds up and open  · Needs adequate pain control, monitor for urinary retention  · Continue oral medications including Azilect 1mg daily  · No longer takes Sinemet     Hypokalemia  Assessment & Plan  · K 3 2 today  · Replete and recheck in AM     Parkinson disease ([facility] )  Assessment & Plan  · Continue oral medications including Azilect 1mg daily  · No longer takes Sinemet     Microcytic hypochromic anemia  Assessment & Plan  · Microcytic hypochromic anemia with anisocytosis, hemoglobin on presentation is 9 3, 4 years ago was 11 0  · Monitor daily- Hgb improved with 1 unit PRBC to 8 6 from 7 0   · Iron studies: normal- no signs of iron deficiency   · No signs of GI or  bleeding   · Anemia is complicated by acute blood loss anemia secondary to left hip hemiarthroplasty   · Continue to monitor Hgb       Essential hypertension  Assessment & Plan  · Patient has chronic HTN with hypertensive urgency on admission- BP had improved but now overnight have increased again- given amlodipine 5mg once and hydralazine 10mg IV q6h prn SBP >170   · Continue lopressor 50mg bid  · BP are currently elevated again- consider initiating amlodipine 5mg daily upon discharge with PCP f/u     Ambulatory dysfunction  Assessment & Plan  Noted  According to , a cane was recommended but patient ambulating without   Needs PT/OT post surgery: currently recommending rehab         VTE Pharmacologic Prophylaxis: VTE Score: 12 High Risk (Score >/= 5) - Pharmacological DVT Prophylaxis Ordered: enoxaparin (Lovenox)  Sequential Compression Devices Ordered  Patient Centered Rounds: I performed bedside rounds with nursing staff today  Discussions with Specialists or Other Care Team Provider: none     Education and Discussions with Family / Patient: Updated  (daughter) via phone  Time Spent for Care: 30 minutes  More than 50% of total time spent on counseling and coordination of care as described above      Current Length of Stay: 2 day(s)  Current Patient Status: Inpatient   Certification Statement: The patient will continue to require additional inpatient hospital stay due to monitoring of hemoglobin, acute blood loss anemia secondary to left hip hemiarthroplasty, blood pressure improvement, hypokalemia  Discharge Plan: Anticipate discharge tomorrow to rehab facility  Code Status: Level 1 - Full Code    Subjective:   Pt states that she is still feeling tired but denies any pain  Fatigue has slightly improved with 1 unit of PRBC  Informed by RN this morning that her blood pressure was high and that her dressing was saturated with blood  Plan is for patient to be discharged tomorrow to SNF pending placement and as long as her labs and vitals are stable tomorrow  Objective:     Vitals:   Temp (24hrs), Av 3 °F (36 8 °C), Min:98 °F (36 7 °C), Max:98 6 °F (37 °C)    Temp:  [98 °F (36 7 °C)-98 6 °F (37 °C)] 98 1 °F (36 7 °C)  HR:  [63-76] 76  Resp:  [14-18] 18  BP: (130-198)/(63-86) 198/86  SpO2:  [96 %] 96 %  Body mass index is 22 49 kg/m²  Input and Output Summary (last 24 hours): Intake/Output Summary (Last 24 hours) at 2021 0945  Last data filed at 2021 0601  Gross per 24 hour   Intake 2834 59 ml   Output 925 ml   Net 1909 59 ml       Physical Exam:   Physical Exam  Constitutional:       General: She is not in acute distress  HENT:      Head: Normocephalic and atraumatic  Mouth/Throat:      Mouth: Mucous membranes are moist    Eyes:      General: No scleral icterus  Pupils: Pupils are equal, round, and reactive to light  Cardiovascular:      Rate and Rhythm: Normal rate and regular rhythm  Heart sounds: Normal heart sounds  Pulmonary:      Effort: Pulmonary effort is normal       Breath sounds: Normal breath sounds  No wheezing, rhonchi or rales  Abdominal:      General: Abdomen is flat  Bowel sounds are normal  There is no distension  Palpations: Abdomen is soft  Tenderness: There is no abdominal tenderness  There is no guarding  Musculoskeletal:      Comments: Clean L dressing in place s/p left hip hemiarthroplasty and hip immobilizer      Skin:     General: Skin is warm  Capillary Refill: Capillary refill takes less than 2 seconds  Neurological:      Mental Status: She is alert  Mental status is at baseline  Psychiatric:         Mood and Affect: Mood normal           Additional Data:     Labs:  Results from last 7 days   Lab Units 07/13/21  0544   WBC Thousand/uL 8 03   HEMOGLOBIN g/dL 8 6*   HEMATOCRIT % 26 8*   PLATELETS Thousands/uL 169   NEUTROS PCT % 77*   LYMPHS PCT % 14   MONOS PCT % 7   EOS PCT % 1     Results from last 7 days   Lab Units 07/13/21  0544 07/11/21  0919   SODIUM mmol/L 137 139   POTASSIUM mmol/L 3 2* 3 8   CHLORIDE mmol/L 103 100   CO2 mmol/L 25 31   BUN mg/dL 11 13   CREATININE mg/dL 0 97 1 17   ANION GAP mmol/L 9 8   CALCIUM mg/dL 8 1* 8 9   ALBUMIN g/dL  --  4 1   TOTAL BILIRUBIN mg/dL  --  0 80   ALK PHOS U/L  --  105   ALT U/L  --  22   AST U/L  --  23   GLUCOSE RANDOM mg/dL 107 99     Results from last 7 days   Lab Units 07/11/21  0919   INR  1 01     Results from last 7 days   Lab Units 07/12/21  1830 07/12/21  1246   POC GLUCOSE mg/dl 137 152*               Lines/Drains:  Invasive Devices     Peripheral Intravenous Line            Peripheral IV 07/11/21 Right Antecubital 2 days          Drain            External Urinary Catheter -- days                      Imaging: No pertinent imaging reviewed      Recent Cultures (last 7 days):         Last 24 Hours Medication List:   Current Facility-Administered Medications   Medication Dose Route Frequency Provider Last Rate    acetaminophen  975 mg Oral Q8H [address] [place] Elisha Webb MD      ALPRAZolam  0 25 mg Oral BID PRN Rakan Whitaker MD      amLODIPine  5 mg Oral Once Jacky Flower PA-C      docusate sodium  100 mg Oral BID Rakan Whitaker MD      enoxaparin  30 mg Subcutaneous Daily Rakan Whitaker MD      hydrALAZINE  10 mg Intravenous Q6H PRN Rakan Whitaker MD      HYDROmorphone  0 2 mg Intravenous Q4H PRN Rakan Whitaker MD      latanoprost  1 drop Both Eyes HS Rakan Whitaker MD      metoprolol tartrate  50 mg Oral BID Panda Almanza MD      oxyCODONE  2 5 mg Oral Q4H PRN Panda Almanza MD      oxyCODONE  5 mg Oral Q4H PRN Panda Almanza MD      pantoprazole  20 mg Oral Daily Before Breakfast Panda Almanza MD      pravastatin  40 mg Oral Daily Panda Almanza MD      rasagiline  1 mg Oral Daily Panda Almanza MD      senna  1 tablet Oral Daily Panda Almanza MD      timolol  1 drop Both Eyes BID Panda Almanza MD          Today, Patient Was Seen By: Lazara Cruz PA-C    **Please Note: This note may have been constructed using a voice recognition system  **

## 2021-07-13 NOTE — ASSESSMENT & PLAN NOTE
Noted   According to , a cane was recommended but patient ambulating without   Needs PT/OT post surgery: currently recommending rehab

## 2021-07-13 NOTE — ASSESSMENT & PLAN NOTE
· Patient has chronic HTN with hypertensive urgency on admission- BP had improved but now overnight have increased again- given amlodipine 5mg once and hydralazine 10mg IV q6h prn SBP >170   · Continue lopressor 50mg bid  · BP are currently elevated again- consider initiating amlodipine 5mg daily upon discharge with PCP f/u

## 2021-07-13 NOTE — PHYSICAL THERAPY NOTE
PHYSICAL THERAPY NOTE     07/13/21 1315   PT Last Visit   PT Visit Date 07/13/21   Note Type   Note Type Treatment   Pain Assessment   Pain Assessment Tool 0-10   Pain Score No Pain   Precautions   Total Hip Replacement   (posterior THP)   Restrictions/Precautions   LLE Weight Bearing Per Order WBAT   General   Chart Reviewed Yes   Cognition   Overall Cognitive Status WFL   Arousal/Participation Cooperative   Attention Within functional limits   Orientation Level Oriented X4   Memory Within functional limits   Following Commands Follows one step commands without difficulty   Subjective   Subjective pt states she needs to have a bowel movement   Bed Mobility   Supine to Sit 3  Moderate assistance   Additional items Assist x 1   Transfers   Sit to Stand 4  Minimal assistance   Additional items Assist x 1   Stand to Sit 4  Minimal assistance   Stand pivot 4  Minimal assistance   Additional items Assist x 1  (RW)   Ambulation/Elevation   Gait pattern Decreased R stance;Decreased L stance;Decreased foot clearance; Foward flexed  (stepping to [facility])   Gait Assistance 4  Minimal assist   Additional items Assist x 1   Assistive Device Rolling walker   Distance 2ft bed to [facility], pt states she needs to have a bowel movement therefore distance limited to commode only   Balance   Static Sitting Fair   Dynamic Sitting Fair   Static Standing Fair -   Dynamic Standing Fair -   Ambulatory Fair -   Activity Tolerance   Activity Tolerance   (no adverse effects to PT noted)   Nurse Made Aware Sheryl   Assessment   Prognosis Fair   Problem List Decreased strength;Decreased range of motion;Decreased endurance; Impaired balance;Decreased mobility; Decreased coordination;Decreased skin integrity;Orthopedic restrictions   Assessment Pt with improved mobility this session, assist x 1 only;  Continues to be apprehensive and c/o pain in LLE with mobility, no pain at rest;  Pt requests to remain on [facility] for attempting to have a bowel movement;  PT checked back with pt 15 minutes later and she still wanted to stay on commode, pt shifted weight for pressure relief and requested prune juice;  PT gave pt prune juice  Pt remains on BSC, dtr present and CNA aware of pt's position  The patient's AM-PAC Basic Mobility Inpatient Short Form Raw Score is 15, Standardized Score is 36 97  A standardized score less than 42 9 suggests the patient may benefit from discharge to post-acute rehabilitation services  Please also refer to the recommendation of the Physical Therapist for safe discharge planning  Barriers to Discharge Decreased caregiver support; Inaccessible home environment   Goals   Patient Goals to get better   STG Expiration Date 07/30/21   Plan   Treatment/Interventions ADL retraining;Functional transfer training;Elevations;LE strengthening/ROM; Therapeutic exercise; Endurance training;Equipment eval/education; Bed mobility; Patient/family training; Compensatory technique education;Gait training;Continued evaluation;Spoke to nursing;OT   Progress Progressing toward goals   PT Frequency 5x/wk   Recommendation   PT Discharge Recommendation Post acute rehabilitation services   PT - OK to Discharge Yes  (When medically ready and bed available)   AM-PAC Basic Mobility Inpatient   Turning in Bed Without Bedrails 2   Lying on Back to Sitting on Edge of Flat Bed 2   Moving Bed to Chair 3   Standing Up From Chair 3   Walk in Room 3   Climb 3-5 Stairs 2   Basic Mobility Inpatient Raw Score 15   Basic Mobility Standardized Score 36 97       Joanne Castillo, PT      Patient Name: Lee Cat  [id number]'[id number] Date: 7/13/2021

## 2021-07-13 NOTE — PROGRESS NOTES
Bradley Gleason  64 y o   female  MR#: [id number]  7/13/2021    Post-op days: 2  Extremity: left hip    Subjective: Patient seen and examined  Lying comfortably in bed  No issues overnight  Pain continues to be well controlled  She received a total of 2 units PRBC yesterday  Denies chest pain, SOB, nausea, vomiting, fever or chills  Vitals:   Vitals:    07/13/21 0551   BP: (!) 198/86   Pulse: 76   Resp: 18   Temp:    SpO2:        Exam:   A&O x 3 NAD  Left hip:  Mepilex dressing intact  Saturated with sanguinous drainage  Dressing taken down: Healing and intact incision with staples intact  No evidence of infection  Clotted blood on incision, no active bleeding  Hip abduction brace in place  Thigh and calf compartments soft, compressible  NV intact        Labs:   WBC   Recent Labs     07/11/21  0919 07/12/21  0557 07/13/21  0020 07/13/21  0544   WBC 7 10 9 78 7 88 8 03     H/H   Recent Labs     07/11/21  0919 07/12/21  0557 07/12/21  1824 07/13/21  0020 07/13/21  0544   HGB 9 3* 7 8* 7 0* 8 2* 8 6*   /  Recent Labs     07/11/21  0919 07/12/21  0557 07/12/21  1824 07/13/21  0020 07/13/21  0544   HCT 30 3* 25 6* 22 8* 25 5* 26 8*     Sed Rate No results for input(s): SEDRATE in the last 72 hours  CRP No results for input(s): CRP in the last 72 hours  Assessment:   S/p left hip hemiarthroplasty    Plan:   WBAT to LLE with assistance  PT/OT- posterior hip precautions  OOB to chair  Up as tolerated  DVT prophylaxis- Lovenox, mechanical  Pain control  Encourage incentive spirometry  ABLA: Hbg 8 6 this AM  Improved from 7 after 2 units PRBC  Continue to monitor vitals and H/H   DC planning  Can be discharged once medically stable  Follow up in office with Dr Yocasta Monzon

## 2021-07-13 NOTE — ASSESSMENT & PLAN NOTE
· Microcytic hypochromic anemia with anisocytosis, hemoglobin on presentation is 9 3, 4 years ago was 11 0  · Monitor daily- Hgb improved with 1 unit PRBC to 8 6 from 7 0   · Iron studies: normal- no signs of iron deficiency   · No signs of GI or  bleeding   · Anemia is complicated by acute blood loss anemia secondary to left hip hemiarthroplasty   · Continue to monitor Hgb

## 2021-07-13 NOTE — ASSESSMENT & PLAN NOTE
· Present on admission as evidenced by systolic blood pressure greater than 200, likely due to pain, tremors from PD- BP have increased overnight again currently 198/86  · Give amlodipine 5 mg once- consider starting patient on daily regimen if BP continues to rise   · Hydralazine prn for BP > 170 mmHg every 6 hours prn

## 2021-07-13 NOTE — PLAN OF CARE
Problem: PAIN - ADULT  Goal: Verbalizes/displays adequate comfort level or baseline comfort level  Description: Interventions:  - Encourage patient to monitor pain and request assistance  - Assess pain using appropriate pain scale  - Administer analgesics based on type and severity of pain and evaluate response  - Implement non-pharmacological measures as appropriate and evaluate response  - Consider cultural and social influences on pain and pain management  - Notify physician/advanced practitioner if interventions unsuccessful or patient reports new pain  Outcome: Progressing     Problem: INFECTION - ADULT  Goal: Absence or prevention of progression during hospitalization  Description: INTERVENTIONS:  - Assess and monitor for signs and symptoms of infection  - Monitor lab/diagnostic results  - Monitor all insertion sites, i e  indwelling lines, tubes, and drains  - Monitor endotracheal if appropriate and nasal secretions for changes in amount and color  - [place] appropriate cooling/warming therapies per order  - Administer medications as ordered  - Instruct and encourage patient and family to use good hand hygiene technique  - Identify and instruct in appropriate isolation precautions for identified infection/condition  Outcome: Progressing  Goal: Absence of fever/infection during neutropenic period  Description: INTERVENTIONS:  - Monitor WBC    Outcome: Progressing     Problem: SAFETY ADULT  Goal: Patient will remain free of falls  Description: INTERVENTIONS:  - Educate patient/family on patient safety including physical limitations  - Instruct patient to call for assistance with activity   - Consult OT/PT to assist with strengthening/mobility   - Keep Call bell within reach  - Keep bed low and locked with side rails adjusted as appropriate  - Keep care items and personal belongings within reach  - Initiate and maintain comfort rounds  - Make Fall Risk Sign visible to staff  - Offer Toileting every 2 Hours, in advance of need  - Initiate/Maintain bed and chair alarm  - Obtain necessary fall risk management equipment: walker   - Apply yellow socks and bracelet for high fall risk patients  - Consider moving patient to room near nurses station  Outcome: Progressing  Goal: Maintain or return to baseline ADL function  Description: INTERVENTIONS:  -  Assess patient's ability to carry out ADLs; assess patient's baseline for ADL function and identify physical deficits which impact ability to perform ADLs (bathing, care of mouth/teeth, toileting, grooming, dressing, etc )  - Assess/evaluate cause of self-care deficits   - Assess range of motion  - Assess patient's mobility; develop plan if impaired  - Assess patient's need for assistive devices and provide as appropriate  - Encourage maximum independence but intervene and supervise when necessary  - Involve family in performance of ADLs  - Assess for home care needs following discharge   - Consider OT consult to assist with ADL evaluation and planning for discharge  - Provide patient education as appropriate  Outcome: Progressing  Goal: Maintains/Returns to pre admission functional level  Description: INTERVENTIONS:  - Perform BMAT or MOVE assessment daily    - Set and communicate daily mobility goal to care team and patient/family/caregiver  - Collaborate with rehabilitation services on mobility goals if consulted  - Perform Range of Motion 3 times a day  - Reposition patient every 2 hours    - Dangle patient 3 times a day  - Stand patient 3 times a day  - Ambulate patient 3 times a day  - Out of bed to chair 3 times a day   - Out of bed for meals 3 times a day  - Out of bed for toileting  - Record patient progress and toleration of activity level   Outcome: Progressing     Problem: DISCHARGE PLANNING  Goal: Discharge to home or other facility with appropriate resources  Description: INTERVENTIONS:  - Identify barriers to discharge w/patient and caregiver  - Arrange for needed discharge resources and transportation as appropriate  - Identify discharge learning needs (meds, wound care, etc )  - Arrange for interpretive services to assist at discharge as needed  - Refer to Case Management Department for coordinating discharge planning if the patient needs post-hospital services based on physician/advanced practitioner order or complex needs related to functional status, cognitive ability, or social support system  Outcome: Progressing     Problem: Knowledge Deficit  Goal: Patient/family/caregiver demonstrates understanding of disease process, treatment plan, medications, and discharge instructions  Description: Complete learning assessment and assess knowledge base  Interventions:  - Provide teaching at level of understanding  - Provide teaching via preferred learning methods  Outcome: Progressing     Problem: MOBILITY - ADULT  Goal: Maintain or return to baseline ADL function  Description: INTERVENTIONS:  -  Assess patient's ability to carry out ADLs; assess patient's baseline for ADL function and identify physical deficits which impact ability to perform ADLs (bathing, care of mouth/teeth, toileting, grooming, dressing, etc )  - Assess/evaluate cause of self-care deficits   - Assess range of motion  - Assess patient's mobility; develop plan if impaired  - Assess patient's need for assistive devices and provide as appropriate  - Encourage maximum independence but intervene and supervise when necessary  - Involve family in performance of ADLs  - Assess for home care needs following discharge   - Consider OT consult to assist with ADL evaluation and planning for discharge  - Provide patient education as appropriate  Outcome: Progressing  Goal: Maintains/Returns to pre admission functional level  Description: INTERVENTIONS:  - Perform BMAT or MOVE assessment daily    - Set and communicate daily mobility goal to care team and patient/family/caregiver     - Collaborate with rehabilitation services on mobility goals if consulted  - Perform Range of Motion 3 times a day  - Reposition patient every 2 hours    - Dangle patient 3 times a day  - Stand patient 3 times a day  - Ambulate patient 3 times a day  - Out of bed to chair 3 times a day   - Out of bed for meals 3 times a day  - Out of bed for toileting  - Record patient progress and toleration of activity level   Outcome: Progressing     Problem: Potential for Falls  Goal: Patient will remain free of falls  Description: INTERVENTIONS:  - Educate patient/family on patient safety including physical limitations  - Instruct patient to call for assistance with activity   - Consult OT/PT to assist with strengthening/mobility   - Keep Call bell within reach  - Keep bed low and locked with side rails adjusted as appropriate  - Keep care items and personal belongings within reach  - Initiate and maintain comfort rounds  - Make Fall Risk Sign visible to staff  - Offer Toileting every 2 Hours, in advance of need  - Initiate/Maintain bed and chair alarm  - Obtain necessary fall risk management equipment: walker   - Apply yellow socks and bracelet for high fall risk patients  - Consider moving patient to room near nurses station  Outcome: Progressing     Problem: Prexisting or High Potential for Compromised Skin Integrity  Goal: Skin integrity is maintained or improved  Description: INTERVENTIONS:  - Identify patients at risk for skin breakdown  - Assess and monitor skin integrity  - Assess and monitor nutrition and hydration status  - Monitor labs   - Assess for incontinence   - Turn and reposition patient  - Assist with mobility/ambulation  - Relieve pressure over bony prominences  - Avoid friction and shearing  - Provide appropriate hygiene as needed including keeping skin clean and dry  - Evaluate need for skin moisturizer/barrier cream  - Collaborate with interdisciplinary team   - Patient/family teaching  - Consider wound care consult Outcome: Progressing

## 2021-07-14 VITALS
OXYGEN SATURATION: 99 % | RESPIRATION RATE: 17 BRPM | BODY MASS INDEX: 21.6 KG/M2 | HEIGHT: 59 IN | SYSTOLIC BLOOD PRESSURE: 115 MMHG | HEART RATE: 71 BPM | WEIGHT: 107.14 LBS | DIASTOLIC BLOOD PRESSURE: 59 MMHG | TEMPERATURE: 98.5 F

## 2021-07-14 PROBLEM — E87.6 HYPOKALEMIA: Status: RESOLVED | Noted: 2021-07-13 | Resolved: 2021-07-14

## 2021-07-14 LAB
ANION GAP SERPL CALCULATED.3IONS-SCNC: 6 MMOL/L (ref 4–13)
BASOPHILS # BLD AUTO: 0.02 THOUSANDS/ΜL (ref 0–0.1)
BASOPHILS NFR BLD AUTO: 0 % (ref 0–1)
BUN SERPL-MCNC: 10 MG/DL (ref 5–25)
CALCIUM SERPL-MCNC: 8.2 MG/DL (ref 8.3–10.1)
CHLORIDE SERPL-SCNC: 102 MMOL/L (ref 100–108)
CO2 SERPL-SCNC: 28 MMOL/L (ref 21–32)
CREAT SERPL-MCNC: 0.85 MG/DL (ref 0.6–1.3)
EOSINOPHIL # BLD AUTO: 0.09 THOUSAND/ΜL (ref 0–0.61)
EOSINOPHIL NFR BLD AUTO: 1 % (ref 0–6)
ERYTHROCYTE [DISTWIDTH] IN BLOOD BY AUTOMATED COUNT: 18.7 % (ref 11.6–15.1)
GFR SERPL CREATININE-BSD FRML MDRD: 66 ML/MIN/1.73SQ M
GLUCOSE SERPL-MCNC: 100 MG/DL (ref 65–140)
HCT VFR BLD AUTO: 26.5 % (ref 34.8–46.1)
HGB BLD-MCNC: 8.5 G/DL (ref 11.5–15.4)
IMM GRANULOCYTES # BLD AUTO: 0.03 THOUSAND/UL (ref 0–0.2)
IMM GRANULOCYTES NFR BLD AUTO: 0 % (ref 0–2)
LYMPHOCYTES # BLD AUTO: 1.49 THOUSANDS/ΜL (ref 0.6–4.47)
LYMPHOCYTES NFR BLD AUTO: 20 % (ref 14–44)
MCH RBC QN AUTO: 22.8 PG (ref 26.8–34.3)
MCHC RBC AUTO-ENTMCNC: 32.1 G/DL (ref 31.4–37.4)
MCV RBC AUTO: 71 FL (ref 82–98)
MONOCYTES # BLD AUTO: 0.48 THOUSAND/ΜL (ref 0.17–1.22)
MONOCYTES NFR BLD AUTO: 7 % (ref 4–12)
NEUTROPHILS # BLD AUTO: 5.31 THOUSANDS/ΜL (ref 1.85–7.62)
NEUTS SEG NFR BLD AUTO: 72 % (ref 43–75)
NRBC BLD AUTO-RTO: 0 /100 WBCS
PLATELET # BLD AUTO: 188 THOUSANDS/UL (ref 149–390)
PMV BLD AUTO: 11.2 FL (ref 8.9–12.7)
POTASSIUM SERPL-SCNC: 3.5 MMOL/L (ref 3.5–5.3)
RBC # BLD AUTO: 3.73 MILLION/UL (ref 3.81–5.12)
SARS-COV-2 RNA RESP QL NAA+PROBE: NEGATIVE
SODIUM SERPL-SCNC: 136 MMOL/L (ref 136–145)
WBC # BLD AUTO: 7.42 THOUSAND/UL (ref 4.31–10.16)

## 2021-07-14 PROCEDURE — 99239 HOSP IP/OBS DSCHRG MGMT >30: CPT | Performed by: INTERNAL MEDICINE

## 2021-07-14 PROCEDURE — U0005 INFEC AGEN DETEC AMPLI PROBE: HCPCS | Performed by: INTERNAL MEDICINE

## 2021-07-14 PROCEDURE — 99024 POSTOP FOLLOW-UP VISIT: CPT | Performed by: PHYSICIAN ASSISTANT

## 2021-07-14 PROCEDURE — 85025 COMPLETE CBC W/AUTO DIFF WBC: CPT | Performed by: PHYSICIAN ASSISTANT

## 2021-07-14 PROCEDURE — 80048 BASIC METABOLIC PNL TOTAL CA: CPT | Performed by: PHYSICIAN ASSISTANT

## 2021-07-14 PROCEDURE — U0003 INFECTIOUS AGENT DETECTION BY NUCLEIC ACID (DNA OR RNA); SEVERE ACUTE RESPIRATORY SYNDROME CORONAVIRUS 2 (SARS-COV-2) (CORONAVIRUS DISEASE [COVID-19]), AMPLIFIED PROBE TECHNIQUE, MAKING USE OF HIGH THROUGHPUT TECHNOLOGIES AS DESCRIBED BY CMS-2020-01-R: HCPCS | Performed by: INTERNAL MEDICINE

## 2021-07-14 RX ORDER — AMLODIPINE BESYLATE 5 MG/1
5 TABLET ORAL DAILY
Status: DISCONTINUED | OUTPATIENT
Start: 2021-07-14 | End: 2021-07-14 | Stop reason: HOSPADM

## 2021-07-14 RX ORDER — OXYCODONE HYDROCHLORIDE 5 MG/1
2.5 TABLET ORAL EVERY 8 HOURS PRN
Qty: 30 TABLET | Refills: 0 | Status: SHIPPED | OUTPATIENT
Start: 2021-07-14 | End: 2021-07-24

## 2021-07-14 RX ORDER — AMLODIPINE BESYLATE 5 MG/1
5 TABLET ORAL DAILY
Qty: 30 TABLET | Refills: 0
Start: 2021-07-14 | End: 2021-08-25 | Stop reason: SDUPTHER

## 2021-07-14 RX ORDER — ALPRAZOLAM 0.25 MG/1
0.25 TABLET ORAL 2 TIMES DAILY PRN
Qty: 30 TABLET | Refills: 0 | Status: SHIPPED | OUTPATIENT
Start: 2021-07-14 | End: 2021-09-24

## 2021-07-14 RX ORDER — ACETAMINOPHEN 325 MG/1
975 TABLET ORAL EVERY 8 HOURS SCHEDULED
Qty: 30 TABLET | Refills: 0
Start: 2021-07-14

## 2021-07-14 RX ORDER — DOCUSATE SODIUM 100 MG/1
100 CAPSULE, LIQUID FILLED ORAL 2 TIMES DAILY
Qty: 10 CAPSULE | Refills: 0
Start: 2021-07-14

## 2021-07-14 RX ADMIN — HYDRALAZINE HYDROCHLORIDE 10 MG: 20 INJECTION INTRAMUSCULAR; INTRAVENOUS at 11:10

## 2021-07-14 RX ADMIN — SENNOSIDES 8.6 MG: 8.6 TABLET, FILM COATED ORAL at 08:39

## 2021-07-14 RX ADMIN — ACETAMINOPHEN 975 MG: 325 TABLET, FILM COATED ORAL at 06:01

## 2021-07-14 RX ADMIN — ENOXAPARIN SODIUM 30 MG: 100 INJECTION SUBCUTANEOUS at 08:38

## 2021-07-14 RX ADMIN — DOCUSATE SODIUM 100 MG: 100 CAPSULE, LIQUID FILLED ORAL at 08:39

## 2021-07-14 RX ADMIN — METOPROLOL TARTRATE 50 MG: 50 TABLET, FILM COATED ORAL at 08:38

## 2021-07-14 RX ADMIN — AMLODIPINE BESYLATE 5 MG: 5 TABLET ORAL at 10:04

## 2021-07-14 RX ADMIN — PANTOPRAZOLE SODIUM 20 MG: 20 TABLET, DELAYED RELEASE ORAL at 06:01

## 2021-07-14 RX ADMIN — TIMOLOL MALEATE 1 DROP: 5 SOLUTION/ DROPS OPHTHALMIC at 08:40

## 2021-07-14 RX ADMIN — RASAGILINE 1 MG: 1 TABLET ORAL at 08:38

## 2021-07-14 RX ADMIN — PRAVASTATIN SODIUM 40 MG: 40 TABLET ORAL at 08:39

## 2021-07-14 NOTE — PROGRESS NOTES
Patient is stable for discharge today if bed is available at [facility]  Discussed anticoagulation with ortho:   Will give Lovenox for 30 days

## 2021-07-14 NOTE — ASSESSMENT & PLAN NOTE
· Due to a mechanical fall as seen on xray   · Pain control as ordered   · DVT prophylaxis chemical and mechanical post surgery  · PT/OT: recommending rehab   · D/C IVF hydration as patient is currently tolerating regular diet  · Pt received 1 unit of PRBC 07/12/21 which improved Hgb from 7 0 to 8 6 yesterday  Today Hgb is stable at 8 5  Dressing was clean, dry, and intact and without saturation of sanguinous drainage as it was yesterday morning  Patient is stable for discharge and cleared by ortho for discharge as well with 2 week f/u with Dr Hawkins Post  Continue to monitor for blood loss        · Ortho consulted appreciate input   · Pt is currently POD#3 s/p left hip hemiarthroplasty   · WBAT to LLE with assistance   · Continue to monitor H&H and vitals  · Incentive spirometry    · lovenox injections for 30 days upon discharge   · Continue pain control with oxycodone 2 5mg q8h PRN moderate pain and APAP   · Keep dressing clean, dry and intact until f/u with ortho in 2 weeks

## 2021-07-14 NOTE — ASSESSMENT & PLAN NOTE
· Needs constant reorientation, familiar faces, window blinds up and open  · Needs adequate pain control, monitor for urinary retention  · Continue oral medications including Azilect 1mg daily  · No longer takes Sinemet

## 2021-07-14 NOTE — DISCHARGE SUMMARY
[place]  Discharge- Teresita Brito 1945, 64 y o  female MRN: [id number]  Unit/Bed#: -01 Encounter: [id number]  Primary Care Provider: Kemi Germain DO   Date and time admitted to hospital: 7/11/2021  8:53 AM    * [facility]  Assessment & Plan  · Due to a mechanical fall as seen on xray   · Pain control as ordered   · DVT prophylaxis chemical and mechanical post surgery  · PT/OT: recommending rehab   · D/C IVF hydration as patient is currently tolerating regular diet  · Pt received 1 unit of PRBC 07/12/21 which improved Hgb from 7 0 to 8 6 yesterday  Today Hgb is stable at 8 5  Dressing was clean, dry, and intact and without saturation of sanguinous drainage as it was yesterday morning  Patient is stable for discharge and cleared by ortho for discharge as well with 2 week f/u with Dr Maxi Pierson  Continue to monitor for blood loss        · Ortho consulted appreciate input   · Pt is currently POD#3 s/p left hip hemiarthroplasty   · WBAT to LLE with assistance   · Continue to monitor H&H and vitals  · Incentive spirometry    · lovenox injections for 30 days upon discharge   · Continue pain control with oxycodone 2 5mg q8h PRN moderate pain and APAP   · Keep dressing clean, dry and intact until f/u with ortho in 2 weeks         Hypertensive urgency  Assessment & Plan  · Present on admission as evidenced by systolic blood pressure greater than 200, likely due to pain, tremors from PD- BP have been labile during hospital stay   · Hydralazine prn for BP > 170 mmHg every 6 hours prn  · Will initiate patient on amlodipine 5 mg daily- patient is to f/u with PCP for further BP management         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 ([facility] )  Assessment & Plan  · Needs constant reorientation, familiar faces, window blinds up and open  · Needs adequate pain control, monitor for urinary retention  · Continue oral medications including Azilect 1mg daily  · No longer takes Sinemet     Microcytic hypochromic anemia  Assessment & Plan  · Microcytic hypochromic anemia with anisocytosis, hemoglobin on presentation is 9 3, 4 years ago was 11 0  · Monitor daily- Hgb improved with 1 unit PRBC to 8 5 today   · Iron studies: normal- no signs of iron deficiency   · No signs of GI or  bleeding   · Anemia is complicated by acute blood loss anemia secondary to left hip hemiarthroplasty         Essential hypertension  Assessment & Plan  · Patient has chronic HTN with hypertensive urgency on admission- BP are labile   · Initiate amlodipine 5mg daily today and continue lopressor 50mg bid and f/u with PCP in regards to further BP management and monitoring     Ambulatory dysfunction  Assessment & Plan  Noted  According to , a cane was recommended but patient ambulating without   PT/OT recommending rehab post-surgery- patient will be discharged to SNF Today     Hypokalemia-resolved as of 7/14/2021  Assessment & Plan  · K 3 5 today   · Replete and recheck in AM       Medical Problems     Resolved Problems  Date Reviewed: 7/13/2021        Resolved    Hypokalemia 7/14/2021     Resolved by  Monika Marquez PA-C              Discharging Physician / Practitioner: Monika Marquez PA-C  PCP: Wang Sams DO  Admission Date:   Admission Orders (From admission, onward)     Ordered        07/11/21 1052  [facility] [place]  Once                   Discharge Date: 07/14/21    Consultations During Hospital Stay:  · Orthopedics, physical therapy, occupational therapy    Procedures Performed:   · Left hip hemiarthroplasty due to closed subcapital fracture of left femur - stable post-operatively     Significant Findings / Test Results:   XR pelvis ap only 1 or 2 vw   Final Result by Weyman Bamberger, MD (07/12 0830)   Typical appearance newly placed left hip hemiarthroplasty      No acute osseous abnormality        Workstation performed: [id number]         TRAUMA - CT head wo contrast   Final Result by Irma Valerio MD (07/11 1000)      No acute intracranial abnormality  Stable microangiopathic changes when allowing for difference in modality  Workstation performed: [id number]         XR Trauma chest portable   ED Interpretation by Micheal Jimenez PA-C (07/11 2904)   No acute abnormalities      Final Result by Efrain Humphreys MD (07/11 2052)      No acute cardiopulmonary disease  Workstation performed: [id number]         XR Trauma pelvis ap only 1 or 2 vw   ED Interpretation by Micheal Jimenez PA-C (44/96 0531)   Fracture femoral neck      Final Result by Efrain Humphreys MD (07/11 2050)      Left femoral neck fracture  Workstation performed: [id number]         XR hip/pelv 2-3 vws left   ED Interpretation by Micheal Jimenez PA-C (07/11 1107)   Fracture left femoral neck  Final Result by Efrain Humphreys MD (07/11 2049)      Left femoral neck fracture  Findings concur with the preliminary report by the referring clinician already in PACS and/or our electronic record EPIC  Workstation performed: [id number]         XR knee 4+ views left injury   ED Interpretation by Micheal Jimenez PA-C (07/11 3189)   No acute fracture  Final Result by Efrain Humphreys MD (07/11 2053)      No acute osseous abnormality  Workstation performed: [id number]           Hgb on admission 07/11/21: 9 3   Hgb on discharge 07/14/21: 8 5     Incidental Findings:   · None     Test Results Pending at Discharge (will require follow up): None  Outpatient Tests Requested:  · None     Complications:  Patient did have acute blood loss anemia due to surgery POD#1 at the incision site which was treated with 1 unit of PRBC after noting Hgb 7 0- Hgb now stable at 8 5     Reason for Admission: mechanical fall at home and hypertensive urgency     Hospital Course:    Parminder Arroyo is a 64 y o  female patient who originally presented to the hospital on 7/11/2021 due to left hip pain and deformity after a fall out of bed  Patient states that she felt weak and fell to the floor  She denied hitting her head, loss of consciousness, headache, dizziness, vision changes  On admission, patient's systolic blood pressure was greater than 200 and was treated for hypertensive urgency  She is not on any antihypertensives at home  X-ray of the left hip showed left displaced subcapital femoral neck fracture  Orthopedic surgery was consulted and left hemiarthroplasty was performed on 07/11/21  After surgery patient was noted to have acute blood loss anemia which was treated with 1 unit of packed red blood cells  Hemoglobin is now stable at discharge at 8 5  Patient's blood pressure labile during admission patient was initiated on Norvasc 5 mg daily upon discharge  Blood pressures are currently stable  Patient is medically stable for discharge to skilled nursing facility with orthopedic follow up in 2-3 weeks with Dr Martha Kaye  Please see above list of diagnoses and related plan for additional information  Condition at Discharge: stable    Discharge Day Visit / Exam:   Subjective:  Patient was resting comfortably in bed and states she was ready to be discharged  No complaints at this time  She denies any pain  No fever, chills, chest pain, SOB, abdominal pain, N/V/D  Vitals: Blood Pressure: 168/72 (07/14/21 1100)  Pulse: 71 (07/14/21 0715)  Temperature: 98 5 °F (36 9 °C) (07/14/21 0715)  Temp Source: Oral (07/14/21 0715)  Respirations: 17 (07/14/21 0715)  Height: 4' 11" (149 9 cm) (07/11/21 1718)  Weight - Scale: 48 6 kg (107 lb 2 3 oz) (07/14/21 0559)  SpO2: 99 % (07/14/21 0715)  Exam:   Physical Exam  Constitutional:       General: She is not in acute distress  Appearance: She is not toxic-appearing  HENT:      Head: Normocephalic and atraumatic        Mouth/Throat:      Mouth: Mucous membranes are moist    Eyes:      General: No scleral icterus  Pupils: Pupils are equal, round, and reactive to light  Cardiovascular:      Rate and Rhythm: Normal rate and regular rhythm  Heart sounds: Normal heart sounds  Pulmonary:      Effort: Pulmonary effort is normal       Breath sounds: Normal breath sounds  No wheezing, rhonchi or rales  Abdominal:      General: Abdomen is flat  Bowel sounds are normal  There is no distension  Palpations: Abdomen is soft  Tenderness: There is no abdominal tenderness  There is no guarding  Musculoskeletal:      Cervical back: Normal range of motion  Right lower leg: No edema  Left lower leg: No edema  Comments: Hip immobilizer, left hip dressing dry, clean, and intact   Skin:     General: Skin is warm and dry  Capillary Refill: Capillary refill takes less than 2 seconds  Comments: Bruising around left incision site s/p left hemiarthroplasty; no active bleeding    Neurological:      Mental Status: She is alert  Mental status is at baseline  Psychiatric:         Mood and Affect: Mood normal           Discussion with Family: Updated  (daughter) via phone  Discharge instructions/Information to patient and family:   See after visit summary for information provided to patient and family  Provisions for Follow-Up Care:  See after visit summary for information related to follow-up care and any pertinent home health orders  Disposition:   [facility]    Planned Readmission: none     Discharge Statement:  I spent 45 minutes discharging the patient  This time was spent on the day of discharge  I had direct contact with the patient on the day of discharge  Greater than 50% of the total time was spent examining patient, answering all patient questions, arranging and discussing plan of care with patient as well as directly providing post-discharge instructions  Additional time then spent on discharge activities      Discharge Medications:  See after visit summary for reconciled discharge medications provided to patient and/or family        **Please Note: This note may have been constructed using a voice recognition system**

## 2021-07-14 NOTE — ASSESSMENT & PLAN NOTE
· Present on admission as evidenced by systolic blood pressure greater than 200, likely due to pain, tremors from PD- BP have been labile during hospital stay   · Hydralazine prn for BP > 170 mmHg every 6 hours prn  · Will initiate patient on amlodipine 5 mg daily- patient is to f/u with PCP for further BP management

## 2021-07-14 NOTE — DISCHARGE INSTRUCTIONS
Discharge Instructions - Davina Valladares 64 y o  female MRN: [id number]  Unit/Bed#: -01    Weight Bearing Status:                                           Weight Bearing as tolerated to left leg  Be sure to maintain Hip Precautions (no bending at waist, no crossing legs, on internally rotating surgical leg)  Maintain abduction pillow at all times while in bed for the first 3 months after surgery    DVT prophylaxis:   Complete course of Lovenox as directed    Pain:  Continue analgesics as directed    Showering Instructions:   Do not shower until followup   Keep incision dry  Dressing Instructions:   Keep dressing clean, dry and intact until follow up appointment  Driving Instructions:  No driving until cleared by Orthopaedic Surgery  PT/OT:  Continue PT/OT on outpatient basis as directed    Appt Instructions: Follow up with Dr Wil Alston 2-3 weeks post-operatively  If you do not have your appointment, please call the clinic at [phone]  Otherwise followup as scheduled  Contact the office sooner if you experience any increased numbness/tingling in the extremities  Miscellaneous:  None                Hypertension in the Older Adult   WHAT YOU NEED TO KNOW:   Hypertension is high blood pressure  Your blood pressure is the force of your blood moving against the walls of your arteries  Hypertension causes your blood pressure to get so high that your heart has to work much harder than normal  This can damage your heart  The cause of your hypertension may not be known  This is called essential or primary hypertension  Hypertension caused by another medical condition, such as kidney disease, is called secondary hypertension  Blood pressure often increases with age  The diastolic (bottom) number tends to decrease, but the systolic (top) number tends to increase  However, hypertension is not a normal part of aging   You may be able to control your blood pressure with lifestyle changes, medicines, or both  DISCHARGE INSTRUCTIONS:   Call 911 or have someone else call for any of the following:   · You have chest pain  · You have any of the following signs of a heart attack:      ? Squeezing, pressure, or pain in your chest    ? You may  also have any of the following:     § Discomfort or pain in your back, neck, jaw, stomach, or arm    § Shortness of breath    § Nausea or vomiting    § Lightheadedness or a sudden cold sweat    · You become confused or have difficulty speaking  · You suddenly feel lightheaded or have trouble breathing  Seek care immediately if:   · You have a severe headache or vision loss  · You have weakness in an arm or leg  · You get dizzy and fall  Contact your healthcare provider if:   · You feel faint, dizzy, confused, or drowsy  · You have been taking your blood pressure medicine but your pressure is higher than your provider says it should be  · Your blood pressure is lower than your healthcare provider said it should be  · You have questions or concerns about your condition or care  Medicines: You may  need any of the following:  · Medicine  may be used to help lower your blood pressure  You may need more than one type of medicine  · Diuretics  help decrease extra fluid that collects in your body  This will help lower your blood pressure  You may urinate more often while you take this medicine  · Take your medicine as directed  Contact your healthcare provider if you think your medicine is not helping or if you have side effects  Tell him or her if you are allergic to any medicine  Keep a list of the medicines, vitamins, and herbs you take  Include the amounts, and when and why you take them  Bring the list or the pill bottles to follow-up visits  Carry your medicine list with you in case of an emergency  Follow up with your healthcare provider as directed: You will need to return to have your blood pressure checked   Write down your questions so you remember to ask them during your visits  What you need to know about the stages of hypertension:       · Normal blood pressure is 119/79 or lower   Your healthcare provider may only check your blood pressure each year if it stays at a normal level  · Elevated blood pressure is 120/79 to 129/79   This is sometimes called prehypertension  Your healthcare provider may suggest lifestyle changes to help lower your blood pressure to a normal level  He or she may then check it again in 3 to 6 months  · Stage 1 hypertension is 130/80  to 139/89   Your provider may recommend lifestyle changes, medication, and checks every 3 to 6 months until your blood pressure is controlled  · Stage 2 hypertension is 140/90 or higher   Treatment may include lifestyle changes and medicines to lower your blood pressure  You will also need to have your blood pressure checked monthly until it is controlled  Manage hypertension:   · Check your blood pressure at home  Avoid smoking, caffeine, and exercise at least 30 minutes before checking your blood pressure  Sit and rest for 5 minutes before you take your blood pressure  Extend your arm and support it on a flat surface  Your arm should be at the same level as your heart  Follow the directions that came with your blood pressure monitor  Check your blood pressure 2 times, 1 minute apart, before you take your medicine in the morning  Also check your blood pressure before your evening meal  Keep a record of your readings and bring it to your follow-up visits  Ask your healthcare provider what your blood pressure should be  · Manage any other health conditions you have  Health conditions such as diabetes can increase your risk for hypertension  Follow your healthcare provider's instructions and take all your medicines as directed  · Ask about all medicines  Certain medicines can increase your blood pressure   Examples include decongestants, herbal supplements, and NSAIDs, such as ibuprofen  Your healthcare provider can tell you which medicines are safe for you to take  This includes prescription and over-the-counter medicines  You may also be taking several prescription medicines for other health conditions  It is important for your healthcare provider to know all your current medicines  Bring a list of your medicines or the pill bottles with you to follow-up visits  This will help your healthcare provider manage your current prescriptions and order new prescriptions  Lifestyle changes you can make to manage hypertension:   · Limit sodium (salt) as directed  Too much sodium can affect your fluid balance  Check labels to find low-sodium or no-salt-added foods  Some low-sodium foods use potassium salts for flavor  Too much potassium can also cause health problems  Your healthcare provider will tell you how much sodium and potassium are safe for you to have in a day  He or she may recommend that you limit sodium to 2,300 mg a day  · Follow the meal plan recommended by your healthcare provider  A dietitian or your provider can give you more information on low-sodium plans or the DASH (Dietary Approaches to Stop Hypertension) eating plan  The DASH plan is low in sodium, unhealthy fats, and total fat  It is high in potassium, calcium, and fiber  · Exercise to maintain a healthy weight  Exercise at least 30 minutes per day, on most days of the week  This will help decrease your blood pressure  Ask your healthcare provider about the best exercise plan for you  · Decrease stress  This may help lower your blood pressure  Learn ways to relax, such as deep breathing or listening to music  · Limit alcohol as directed  Alcohol can increase your blood pressure  Ask your healthcare provider if it is safe for you to drink alcohol  Also ask how much is safe for you to drink  · Do not smoke    Nicotine and other chemicals in cigarettes and cigars can increase your blood pressure and also cause lung damage  Ask your healthcare provider for information if you currently smoke and need help to quit  E-cigarettes or smokeless tobacco still contain nicotine  Talk to your healthcare provider before you use these products  © Copyright [address] Information is for End User's use only and may not be sold, redistributed or otherwise used for commercial purposes  All illustrations and images included in CareNotes® are the copyrighted property of A D A M , Inc  or [facility] Nancy Conley   The above information is an  only  It is not intended as medical advice for individual conditions or treatments  Talk to your doctor, nurse or pharmacist before following any medical regimen to see if it is safe and effective for you  Anemia   WHAT YOU NEED TO KNOW:   Anemia is a low number of red blood cells or a low amount of hemoglobin in your red blood cells  Hemoglobin is a protein that helps carry oxygen throughout your body  Red blood cells use iron to create hemoglobin  Anemia may develop if your body does not have enough iron  It may also develop if your body does not make enough red blood cells or they die faster than your body can make them  DISCHARGE INSTRUCTIONS:   Call 911 or have someone call 911 for any of the following:   · You lose consciousness  · You have severe chest pain  Seek care immediately if:   · You have dark or bloody bowel movements  Contact your healthcare provider if:   · Your symptoms are worse, even after treatment  · You have questions or concerns about your condition or care  Medicines:   · Iron or folic acid supplements  help increase your red blood cell and hemoglobin levels  · Vitamin B12 injections  may help boost your red blood cell level and decrease your symptoms  Ask your healthcare provider how to inject B12  · Take your medicine as directed    Contact your healthcare provider if you think your medicine is not helping or if you have side effects  Tell him of her if you are allergic to any medicine  Keep a list of the medicines, vitamins, and herbs you take  Include the amounts, and when and why you take them  Bring the list or the pill bottles to follow-up visits  Carry your medicine list with you in case of an emergency  Prevent anemia:  Eat healthy foods rich in iron and vitamin C  Nuts, meat, dark leafy green vegetables, and beans are high in iron and protein  Vitamin C helps your body absorb iron  Foods rich in vitamin C include oranges and other citrus fruits  Ask your healthcare provider for a list of other foods that are high in iron or vitamin C  Ask if you need to be on a special diet  Follow up with your healthcare provider as directed:  Write down your questions so you remember to ask them during your visits  © Copyright [facility] [address] Information is for End User's use only and may not be sold, redistributed or otherwise used for commercial purposes  All illustrations and images included in CareNotes® are the copyrighted property of A D A M , Inc  or [address] [place] [address]  The above information is an  only  It is not intended as medical advice for individual conditions or treatments  Talk to your doctor, nurse or pharmacist before following any medical regimen to see if it is safe and effective for you  Amlodipine (By mouth)   Amlodipine ([id number])  Lowers high blood pressure and relieves chronic stable angina (chest pain)  This medicine is a calcium channel blocker  Brand Name(s): Jacqueline Saleh   There may be other brand names for this medicine  When This Medicine Should Not Be Used: This medicine is not right for everyone  Do not use it if you had an allergic reaction to amlodipine  How to Use This Medicine:   Liquid, Tablet, Dissolving Tablet  · Take your medicine as directed   Your dose may need to be changed several times to find what works best for you  Take this medicine at the same time each day  · Read and follow the patient instructions that come with this medicine  Talk to your doctor or pharmacist if you have any questions  · Missed dose: Take a dose as soon as you remember  If it is almost time for your next dose, wait until then and take a regular dose  Do not take extra medicine to make up for a missed dose  · Store the medicine in a closed container at room temperature, away from heat, moisture, and direct light  Store the oral liquid in the refrigerator  Do not freeze  Drugs and Foods to Avoid:   Ask your doctor or pharmacist before using any other medicine, including over-the-counter medicines, vitamins, and herbal products  · Some medicines can affect how amlodipine works  Tell your doctor if you are also using clarithromycin, cyclosporine, diltiazem, itraconazole, ritonavir, sildenafil, simvastatin, or tacrolimus  Warnings While Using This Medicine:   · Tell your doctor if you are pregnant or breastfeeding, or if you have liver disease, or heart or blood vessel disease (including coronary artery disease, aortic stenosis)  · This medicine may cause the following problems:  ? Low blood pressure  ? Worsening chest pain  ? Increased risk of heart attack  · This medicine could lower your blood pressure too much, especially when you first use it or if you are dehydrated  Stand or sit up slowly if you feel lightheaded or dizzy  · Do not stop using this medicine without asking your doctor, even if you feel well  This medicine will not cure high blood pressure, but it will help keep it in normal range  You may have to take blood pressure medicine for the rest of your life  · Your doctor will check your progress and the effects of this medicine at regular visits  Keep all appointments  · Keep all medicine out of the reach of children  Never share your medicine with anyone    Possible Side Effects While Using This Medicine:   Call your doctor right away if you notice any of these side effects:  · Allergic reaction: Itching or hives, swelling in your face or hands, swelling or tingling in your mouth or throat, chest tightness, trouble breathing  · Lightheadedness, dizziness, fainting  · New or worsening chest pain  · Swelling in your hands, ankles, or legs  · Trouble breathing, nausea, unusual sweating  If you notice other side effects that you think are caused by this medicine, tell your doctor  Call your doctor for medical advice about side effects  You may report side effects to FDA at 2-661-FDA-4331  © Copyright [employer] 2021 Information is for End User's use only and may not be sold, redistributed or otherwise used for commercial purposes  The above information is an  only  It is not intended as medical advice for individual conditions or treatments  Talk to your doctor, nurse or pharmacist before following any medical regimen to see if it is safe and effective for you

## 2021-07-14 NOTE — ASSESSMENT & PLAN NOTE
Noted   According to , a cane was recommended but patient ambulating without   PT/OT recommending rehab post-surgery- patient will be discharged to SNF [facility]

## 2021-07-14 NOTE — PLAN OF CARE
Problem: PAIN - ADULT  Goal: Verbalizes/displays adequate comfort level or baseline comfort level  Description: Interventions:  - Encourage patient to monitor pain and request assistance  - Assess pain using appropriate pain scale  - Administer analgesics based on type and severity of pain and evaluate response  - Implement non-pharmacological measures as appropriate and evaluate response  - Consider cultural and social influences on pain and pain management  - Notify physician/advanced practitioner if interventions unsuccessful or patient reports new pain  7/14/2021 1146 by Nicho Vázquez RN  Outcome: Adequate for Discharge  7/14/2021 1014 by Nicho Vázquez RN  Outcome: Progressing     Problem: INFECTION - ADULT  Goal: Absence or prevention of progression during hospitalization  Description: INTERVENTIONS:  - Assess and monitor for signs and symptoms of infection  - Monitor lab/diagnostic results  - Monitor all insertion sites, i e  indwelling lines, tubes, and drains  - Monitor endotracheal if appropriate and nasal secretions for changes in amount and color  - [place] appropriate cooling/warming therapies per order  - Administer medications as ordered  - Instruct and encourage patient and family to use good hand hygiene technique  - Identify and instruct in appropriate isolation precautions for identified infection/condition  7/14/2021 1146 by Nicho Vázquez RN  Outcome: Adequate for Discharge  7/14/2021 1014 by Nicho Vázquez RN  Outcome: Progressing  Goal: Absence of fever/infection during neutropenic period  Description: INTERVENTIONS:  - Monitor WBC    7/14/2021 1146 by Nicho Vázquez RN  Outcome: Adequate for Discharge  7/14/2021 1014 by Nicho Vázquez RN  Outcome: Progressing     Problem: SAFETY ADULT  Goal: Patient will remain free of falls  Description: INTERVENTIONS:  - Educate patient/family on patient safety including physical limitations  - Instruct patient to call for assistance with activity   - Consult OT/PT to assist with strengthening/mobility   - Keep Call bell within reach  - Keep bed low and locked with side rails adjusted as appropriate  - Keep care items and personal belongings within reach  - Initiate and maintain comfort rounds  - Make Fall Risk Sign visible to staff  - Offer Toileting every 2 Hours, in advance of need  - Initiate/Maintain bed and chair alarm  - Obtain necessary fall risk management equipment: walker   - Apply yellow socks and bracelet for high fall risk patients  - Consider moving patient to room near nurses station  7/14/2021 1146 by Delia Miller RN  Outcome: Adequate for Discharge  7/14/2021 1014 by Delia Miller RN  Outcome: Progressing  Goal: Maintain or return to baseline ADL function  Description: INTERVENTIONS:  -  Assess patient's ability to carry out ADLs; assess patient's baseline for ADL function and identify physical deficits which impact ability to perform ADLs (bathing, care of mouth/teeth, toileting, grooming, dressing, etc )  - Assess/evaluate cause of self-care deficits   - Assess range of motion  - Assess patient's mobility; develop plan if impaired  - Assess patient's need for assistive devices and provide as appropriate  - Encourage maximum independence but intervene and supervise when necessary  - Involve family in performance of ADLs  - Assess for home care needs following discharge   - Consider OT consult to assist with ADL evaluation and planning for discharge  - Provide patient education as appropriate  7/14/2021 1146 by Delia Miller RN  Outcome: Adequate for Discharge  7/14/2021 1014 by Delia Miller RN  Outcome: Progressing  Goal: Maintains/Returns to pre admission functional level  Description: INTERVENTIONS:  - Perform BMAT or MOVE assessment daily    - Set and communicate daily mobility goal to care team and patient/family/caregiver     - Collaborate with rehabilitation services on mobility goals if consulted  - Perform Range of Motion 3 times a day   - Reposition patient every 2 hours  - Dangle patient 3 times a day  - Stand patient 3 times a day  - Ambulate patient 3 times a day  - Out of bed to chair 3 times a day   - Out of bed for meals 3 times a day  - Out of bed for toileting  - Record patient progress and toleration of activity level   7/14/2021 1146 by Keshav Bazan RN  Outcome: Adequate for Discharge  7/14/2021 1014 by Keshav Bazan RN  Outcome: Progressing     Problem: DISCHARGE PLANNING  Goal: Discharge to home or other facility with appropriate resources  Description: INTERVENTIONS:  - Identify barriers to discharge w/patient and caregiver  - Arrange for needed discharge resources and transportation as appropriate  - Identify discharge learning needs (meds, wound care, etc )  - Arrange for interpretive services to assist at discharge as needed  - Refer to Case Management Department for coordinating discharge planning if the patient needs post-hospital services based on physician/advanced practitioner order or complex needs related to functional status, cognitive ability, or social support system  7/14/2021 1146 by Keshav Bazan RN  Outcome: Adequate for Discharge  7/14/2021 1014 by Keshav Bazan RN  Outcome: Progressing     Problem: Knowledge Deficit  Goal: Patient/family/caregiver demonstrates understanding of disease process, treatment plan, medications, and discharge instructions  Description: Complete learning assessment and assess knowledge base    Interventions:  - Provide teaching at level of understanding  - Provide teaching via preferred learning methods  7/14/2021 1146 by Keshav Bazan RN  Outcome: Adequate for Discharge  7/14/2021 1014 by Keshav Bazan RN  Outcome: Progressing     Problem: MOBILITY - ADULT  Goal: Maintain or return to baseline ADL function  Description: INTERVENTIONS:  -  Assess patient's ability to carry out ADLs; assess patient's baseline for ADL function and identify physical deficits which impact ability to perform ADLs (bathing, care of mouth/teeth, toileting, grooming, dressing, etc )  - Assess/evaluate cause of self-care deficits   - Assess range of motion  - Assess patient's mobility; develop plan if impaired  - Assess patient's need for assistive devices and provide as appropriate  - Encourage maximum independence but intervene and supervise when necessary  - Involve family in performance of ADLs  - Assess for home care needs following discharge   - Consider OT consult to assist with ADL evaluation and planning for discharge  - Provide patient education as appropriate  7/14/2021 1146 by Nicho Vázquez RN  Outcome: Adequate for Discharge  7/14/2021 1014 by Nicho Vázquez RN  Outcome: Progressing  Goal: Maintains/Returns to pre admission functional level  Description: INTERVENTIONS:  - Perform BMAT or MOVE assessment daily    - Set and communicate daily mobility goal to care team and patient/family/caregiver  - Collaborate with rehabilitation services on mobility goals if consulted  - Perform Range of Motion 3 times a day  - Reposition patient every 2 hours    - Dangle patient 3 times a day  - Stand patient 3 times a day  - Ambulate patient 3 times a day  - Out of bed to chair 3 times a day   - Out of bed for meals 3 times a day  - Out of bed for toileting  - Record patient progress and toleration of activity level   7/14/2021 1146 by Nicho Vázquez RN  Outcome: Adequate for Discharge  7/14/2021 1014 by Nicho Vázquez RN  Outcome: Progressing     Problem: Potential for Falls  Goal: Patient will remain free of falls  Description: INTERVENTIONS:  - Educate patient/family on patient safety including physical limitations  - Instruct patient to call for assistance with activity   - Consult OT/PT to assist with strengthening/mobility   - Keep Call bell within reach  - Keep bed low and locked with side rails adjusted as appropriate  - Keep care items and personal belongings within reach  - Initiate and maintain comfort rounds  - Make Fall Risk Sign visible to staff  - Offer Toileting every 2 Hours, in advance of need  - Initiate/Maintain bed and chair alarm  - Obtain necessary fall risk management equipment: walker   - Apply yellow socks and bracelet for high fall risk patients  - Consider moving patient to room near nurses station  7/14/2021 1146 by Alejandra Hamilton RN  Outcome: Adequate for Discharge  7/14/2021 1014 by Alejandra Hamilton RN  Outcome: Progressing     Problem: Prexisting or High Potential for Compromised Skin Integrity  Goal: Skin integrity is maintained or improved  Description: INTERVENTIONS:  - Identify patients at risk for skin breakdown  - Assess and monitor skin integrity  - Assess and monitor nutrition and hydration status  - Monitor labs   - Assess for incontinence   - Turn and reposition patient  - Assist with mobility/ambulation  - Relieve pressure over bony prominences  - Avoid friction and shearing  - Provide appropriate hygiene as needed including keeping skin clean and dry  - Evaluate need for skin moisturizer/barrier cream  - Collaborate with interdisciplinary team   - Patient/family teaching  - Consider wound care consult   7/14/2021 1146 by Alejandra Hamilton RN  Outcome: Adequate for Discharge  7/14/2021 1014 by Alejandra Hamilton RN  Outcome: Progressing

## 2021-07-14 NOTE — DISCHARGE INSTR - AVS FIRST PAGE
Dear Merline Wolf,     It was our pleasure to care for you here at [facility]/[address]  It is our hope that we were always able to exceed the expected standards for your care during your stay  You were hospitalized due to closed displaced fracture of the left femoral neck  You were cared for on the 2nd  floor by Evy Juárez PA-C under the service of Shahnaz Baltazar MD with the [facility] who covers for your primary care physician (PCP), Lori Rawls DO, while you were hospitalized  If you have any questions or concerns related to this hospitalization, you may contact us at [phone]  For follow up as well as any medication refills, we recommend that you follow up with your primary care physician  A registered nurse will reach out to you by phone within a few days after your discharge to answer any additional questions that you may have after going home  However, at this time we provide for you here, the most important instructions / recommendations at discharge:     · Notable Medication Adjustments -   · Added Norvasc 5mg daily for hypertension- follow up with PCP for further blood pressure management   · Testing Required after Discharge -   · None   · Important follow up information -                  Follow up with Dr Ashkan Palomino 2-3 weeks post-operatively  Otherwise followup as scheduled  · Other Instructions -                Keep dressing clean, dry and intact until follow up appointment  · Please review this entire after visit summary as additional general instructions including medication list, appointments, activity, diet, any pertinent wound care, and other additional recommendations from your care team that may be provided for you        Sincerely,     Evy Juárez PA-C

## 2021-07-14 NOTE — PROGRESS NOTES
Oli Engel  64 y o   female  MR#: [id number]  7/14/2021    Post-op days: 3  Extremity: left hip    Subjective: Patient seen and examined at bedside  She states that she is overall doing well  She states that her pain is under control at this time  She was hypertensive overnight has normalized  She denies having any fevers chills or sweats have any numbness or tingling in left lower extremity  Vitals:   Vitals:    07/14/21 0715   BP: (!) 189/84   Pulse: 71   Resp: 17   Temp: 98 5 °F (36 9 °C)   SpO2: 99%       Exam:   A&O x 3 NAD  Left hip:  Mepilex dressing C/D/I  Hip abduction brace in place  Thigh and calf compartments soft, compressible  NV intact        Labs:   WBC   Recent Labs     07/12/21  0557 07/13/21  0020 07/13/21  0544 07/14/21  0557   WBC 9 78 7 88 8 03 7 42     H/H   Recent Labs     07/12/21  0557 07/12/21  1824 07/13/21  0020 07/13/21  0544 07/14/21  0557   HGB 7 8* 7 0* 8 2* 8 6* 8 5*   /  Recent Labs     07/12/21  0557 07/12/21  1824 07/13/21  0020 07/13/21  0544 07/14/21  0557   HCT 25 6* 22 8* 25 5* 26 8* 26 5*     Sed Rate No results for input(s): SEDRATE in the last 72 hours  CRP No results for input(s): CRP in the last 72 hours  Assessment:   S/p left hip hemiarthroplasty    Plan:   WBAT to LLE with assistance  PT/OT- posterior hip precautions  OOB to chair  Up as tolerated  DVT prophylaxis- Lovenox, mechanical  Pain control  Encourage incentive spirometry  ABLA: Hbg 8 5 this AM  Continue to monitor vitals and H/H   DC planning  Can be discharged once medically stable  Follow up in office with Dr Yajaira Herrera PAGERMAINE

## 2021-07-14 NOTE — ASSESSMENT & PLAN NOTE
· Patient has chronic HTN with hypertensive urgency on admission- BP are labile   · Initiate amlodipine 5mg daily today and continue lopressor 50mg bid and f/u with PCP in regards to further BP management and monitoring

## 2021-07-14 NOTE — ASSESSMENT & PLAN NOTE
· Microcytic hypochromic anemia with anisocytosis, hemoglobin on presentation is 9 3, 4 years ago was 11 0  · Monitor daily- Hgb improved with 1 unit PRBC to 8 5 today   · Iron studies: normal- no signs of iron deficiency   · No signs of GI or  bleeding   · Anemia is complicated by acute blood loss anemia secondary to left hip hemiarthroplasty

## 2021-07-14 NOTE — CASE MANAGEMENT
LOS: 3  Patient approved for admission at [facility], family's first choice  As per [facility] [employer] of PT , patient can be transported by [address] arranged [facility] van to transport at 12:45 today  Notified patient, her daughter, Cj Galarza at [phone], Fernando Alcantara [facility] and Earnestine Maynard, patient's nurse of transport time

## 2021-07-15 ENCOUNTER — PATIENT OUTREACH (OUTPATIENT)
Dept: CASE MANAGEMENT | Facility: HOSPITAL | Age: 76
End: 2021-07-15

## 2021-07-15 LAB
ABO GROUP BLD BPU: NORMAL
ABO GROUP BLD BPU: NORMAL
BPU ID: NORMAL
BPU ID: NORMAL
CROSSMATCH: NORMAL
CROSSMATCH: NORMAL
UNIT DISPENSE STATUS: NORMAL
UNIT DISPENSE STATUS: NORMAL
UNIT PRODUCT CODE: NORMAL
UNIT PRODUCT CODE: NORMAL
UNIT RH: NORMAL
UNIT RH: NORMAL

## 2021-07-16 ENCOUNTER — TELEPHONE (OUTPATIENT)
Dept: OBGYN CLINIC | Facility: HOSPITAL | Age: 76
End: 2021-07-16

## 2021-07-16 NOTE — TELEPHONE ENCOUNTER
Left msg with [facility] that patient has appt scheduled with Dr Jesús Wharton on 8/5 at noon at [facility]  Asked them to call back to confirm

## 2021-07-22 ENCOUNTER — PATIENT OUTREACH (OUTPATIENT)
Dept: CASE MANAGEMENT | Facility: HOSPITAL | Age: 76
End: 2021-07-22

## 2021-07-22 LAB — C AG RBC QL: NEGATIVE

## 2021-07-23 ENCOUNTER — TELEPHONE (OUTPATIENT)
Dept: FAMILY MEDICINE CLINIC | Facility: CLINIC | Age: 76
End: 2021-07-23

## 2021-07-26 ENCOUNTER — TELEPHONE (OUTPATIENT)
Dept: FAMILY MEDICINE CLINIC | Facility: CLINIC | Age: 76
End: 2021-07-26

## 2021-07-26 ENCOUNTER — PATIENT OUTREACH (OUTPATIENT)
Dept: CASE MANAGEMENT | Facility: HOSPITAL | Age: 76
End: 2021-07-26

## 2021-07-26 DIAGNOSIS — S72.002A CLOSED DISPLACED FRACTURE OF LEFT FEMORAL NECK (HCC): ICD-10-CM

## 2021-07-26 DIAGNOSIS — Z71.89 COMPLEX CARE COORDINATION: Primary | ICD-10-CM

## 2021-07-26 NOTE — TELEPHONE ENCOUNTER
Patient's  had called to schedule a follow up patient was discharged from [facility] on 7/24, he wanted to schedule a tcm appointment  Please advise Kesha Forrester can be reached at [phone]

## 2021-07-27 ENCOUNTER — PATIENT OUTREACH (OUTPATIENT)
Dept: FAMILY MEDICINE CLINIC | Facility: CLINIC | Age: 76
End: 2021-07-27

## 2021-07-27 RX ORDER — BRIMONIDINE TARTRATE 0.1 %
DROPS OPHTHALMIC (EYE)
COMMUNITY
Start: 2021-07-06

## 2021-07-27 NOTE — PROGRESS NOTES
Outpatient Care Management Note:    New JUAN MIGUEL/BPCI referral received  Dimitri Stone and spoke with Mr Santiago Anderson  He states that Reid Doss is using a walker and doing well  [facility]  I reviewed that Reid Hence will need blood work completed and I will request the visiting nurse draw the lab  Mr Santiago Anderson has scheduled Sonam's follow up appts  He thinks he may need to cancel her appt with Dr Reyes because they have not practiced getting in the car yet  He will work with therapy to address this concern  CM recommended Mr Santiago Anderson call the office and request a telemedicine visit  Med review completed  Reid Doss is taking ecotrin 325 mg daily instead of lovenox and atorvastatin 10 mg daily instead of pravastatin  Mr Santiago Anderson agreed to ongoing outreach but declined my contact information  He will call the office with any questions  Called [facility]  They need BMP faxed to [phone]  BMP order faxed

## 2021-07-29 ENCOUNTER — TELEPHONE (OUTPATIENT)
Dept: FAMILY MEDICINE CLINIC | Facility: CLINIC | Age: 76
End: 2021-07-29

## 2021-07-29 ENCOUNTER — TELEMEDICINE (OUTPATIENT)
Dept: FAMILY MEDICINE CLINIC | Facility: CLINIC | Age: 76
End: 2021-07-29
Payer: MEDICARE

## 2021-07-29 VITALS — WEIGHT: 147 LBS | BODY MASS INDEX: 29.64 KG/M2 | HEIGHT: 59 IN

## 2021-07-29 DIAGNOSIS — S72.002A CLOSED DISPLACED FRACTURE OF LEFT FEMORAL NECK (HCC): Primary | ICD-10-CM

## 2021-07-29 PROCEDURE — 99213 OFFICE O/P EST LOW 20 MIN: CPT | Performed by: FAMILY MEDICINE

## 2021-07-29 RX ORDER — ATORVASTATIN CALCIUM 10 MG/1
TABLET, FILM COATED ORAL
COMMUNITY
Start: 2021-07-26 | End: 2021-08-25 | Stop reason: SDUPTHER

## 2021-07-29 NOTE — PROGRESS NOTES
Virtual Brief Visit    Verification of patient location:    Patient is located in the following state in which I hold an active license [place]      Assessment/Plan:    Problem List Items Addressed This Visit        Musculoskeletal and Integument    Closed displaced fracture of left femoral neck (HCC) - Primary          BMI Counseling: Body mass index is 29 69 kg/m²  The BMI is above normal  Nutrition recommendations include decreasing portion sizes, encouraging healthy choices of fruits and vegetables, decreasing fast food intake, consuming healthier snacks, limiting drinks that contain sugar, moderation in carbohydrate intake, increasing intake of lean protein, reducing intake of saturated and trans fat and reducing intake of cholesterol  Exercise recommendations include exercising 3-5 times per week  Reason for visit is   Chief Complaint   Patient presents with    Follow-up     ED SLUB 07/11-07/14 then SNF till 07/24/21, patient feeling better, no pain, gets [facility] and PT    Virtual Brief Visit        Encounter provider Davey Welsh DO    Provider located at [address] [place] [id number] [phone]    Recent Visits  Date Type Provider Dept   07/26/21 Telephone Adeel Eastman [facility]   Showing recent visits within past 7 days and meeting all other requirements  Today's Visits  Date Type Provider Dept   07/29/21 Telephone DO [facility]   07/29/21 Telephone [facility]   07/29/21 Telemedicine DO [facility]   Showing today's visits and meeting all other requirements  Future Appointments  No visits were found meeting these conditions  Showing future appointments within next 150 days and meeting all other requirements       After connecting through [employer] and patient was informed that this is a secure, HIPAA-compliant platform  She agrees to proceed  , the patient was identified by name and date of birth  Lee Cat was informed that this is a telemedicine visit and that the visit is being conducted through [address] [facility] [address] [facility] [address] [facility] and patient was informed that this is a secure, HIPAA-compliant platform  She agrees to proceed  My office door was closed  No one else was in the room  She acknowledged consent and understanding of privacy and security of the platform  The patient has agreed to participate and understands she can discontinue the visit at any time  Patient is aware this is a billable service  Subjective    Lee Cat is a 64 y o  female  Presents for follow-up of hip fracture  Patient here for followup  No complaints  Patient states she is doing well   meds reconciled  she has visiting nursing and physical therapy coming to her house       Past Medical History:   Diagnosis Date    Constipation     Hyperlipidemia     Hypertension     Parkinson disease ([address] )        Past Surgical History:   Procedure Laterality Date     SECTION      CHOLECYSTECTOMY      AL PARTIAL HIP REPLACEMENT Left 2021    Procedure: HEMIARTHROPLASTY HIP (BIPOLAR);   Surgeon: Elizabeth Srinivasan MD;  Location:  MAIN OR;  Service: Orthopedics       Current Outpatient Medications   Medication Sig Dispense Refill    acetaminophen (TYLENOL) 325 mg tablet Take 3 tablets (975 mg total) by mouth every 8 (eight) hours 30 tablet 0    Alphagan P 0 1 % INSTILL 1 DROP INTO EACH EYE TWICE DAILY      ALPRAZolam (XANAX) 0 25 mg tablet Take 1 tablet (0 25 mg total) by mouth 2 (two) times a day as needed for anxiety for up to 10 days 30 tablet 0    amLODIPine (NORVASC) 5 mg tablet Take 1 tablet (5 mg total) by mouth daily 30 tablet 0    aspirin (ECOTRIN) 325 mg EC tablet Take 325 mg by mouth every 6 (six) hours as needed for mild pain      atorvastatin (LIPITOR) 10 mg tablet TAKE 1 TABLET BY MOUTH ONCE DAILY IN THE EVENING FOR CHOLESTEROL      docusate sodium (COLACE) 100 mg capsule Take 1 capsule (100 mg total) by mouth 2 (two) times a day 10 capsule 0    esomeprazole (NexIUM) 20 mg capsule Take 20 mg by mouth every morning before breakfast      latanoprost (XALATAN) 0 005 % ophthalmic solution 1 drop daily at bedtime      metoprolol tartrate (LOPRESSOR) 50 mg tablet Take 1 tablet (50 mg total) by mouth 2 (two) times a day (Patient taking differently: Take 50 mg by mouth daily ) 180 tablet 1    rasagiline (AZILECT) 1 MG Take 1 tablet (1 mg total) by mouth daily 90 each 3    enoxaparin (LOVENOX) 30 mg/0 3 mL Inject 0 3 mL (30 mg total) under the skin daily (Patient not taking: Reported on 7/27/2021) 9 mL 0    pravastatin (PRAVACHOL) 40 mg tablet Take 40 mg by mouth daily  (Patient not taking: Reported on 7/27/2021)      timolol (BETIMOL) 0 5 % ophthalmic solution Administer 1 drop to both eyes daily  (Patient not taking: Reported on 7/29/2021)       No current facility-administered medications for this visit  No Known Allergies    Review of Systems   Constitutional: Negative  HENT: Negative  Eyes: Negative  Respiratory: Negative  Cardiovascular: Negative  Gastrointestinal: Negative  Endocrine: Negative  Genitourinary: Negative  Musculoskeletal: Negative  Skin: Negative  Allergic/Immunologic: Negative  Neurological: Negative  Hematological: Negative  Psychiatric/Behavioral: Negative  All other systems reviewed and are negative  Vitals:    07/29/21 0856   Weight: 66 7 kg (147 lb)   Height: 4' 11" (1 499 m)         I spent 10 minutes directly with the patient during this visit    [address] [place] [address] verbally agrees to participate in [employer]   Pt is aware that [employer] could be limited without vital signs or the ability to perform a full hands-on physical exam  Sonam Vaca understands she or the provider may request at any time to terminate the video visit and request the patient to seek care or treatment in person

## 2021-07-29 NOTE — PROGRESS NOTES
Virtual Brief Visit    Verification of patient location:    Patient is located in the following state in which I hold an active license {[facility] [place] [facility] virtual patient location:[address]}      Assessment/Plan:    Problem List Items Addressed This Visit        Musculoskeletal and Integument    Closed displaced fracture of left femoral neck ([address] ) - Primary                Reason for visit is   Chief Complaint   Patient presents with   Shoshana Silence Follow-up     ED SLUB 07/11-07/14 then SNF till 07/24/21, patient feeling better, no pain, gets [facility] and PT    Virtual Brief Visit        Encounter provider Dakota Arellano DO    Provider located at [address] [place] [id number] [phone]    Recent Visits  Date Type Provider Dept   07/26/21 Telephone Adeel Eastman [facility]   Showing recent visits within past 7 days and meeting all other requirements  Today's Visits  Date Type Provider Dept   07/29/21 Telephone Yusra Zamudio [facility]   07/29/21 Telemedicine Deejay Reyes DO [facility]   Showing today's visits and meeting all other requirements  Future Appointments  No visits were found meeting these conditions  Showing future appointments within next 150 days and meeting all other requirements       After connecting through {AMB VIRTUAL VISIT IGLDYT:13777}, the patient was identified by name and date of birth  Adis Shaw was informed that this is a telemedicine visit and that the visit is being conducted through {AMB VIRTUAL VISIT DAMYQB:60667}  {Telemedicine confidentiality :02485} {Telemedicine participants:57556}  She acknowledged consent and understanding of privacy and security of the platform  The patient has agreed to participate and understands she can discontinue the visit at any time  Patient is aware this is a billable service  Subjective    Adis Shaw is a 64 y o  female ***    HPI     Past Medical History:   Diagnosis Date    Constipation     Hyperlipidemia     Hypertension     Parkinson disease [facility])        Past Surgical History:   Procedure Laterality Date     SECTION      CHOLECYSTECTOMY      [place] PARTIAL HIP REPLACEMENT Left 2021    Procedure: HEMIARTHROPLASTY HIP (BIPOLAR); Surgeon: Phillip Sandoval MD;  Location:  MAIN OR;  Service: Orthopedics       Current Outpatient Medications   Medication Sig Dispense Refill    acetaminophen (TYLENOL) 325 mg tablet Take 3 tablets (975 mg total) by mouth every 8 (eight) hours 30 tablet 0    Alphagan P 0 1 % INSTILL 1 DROP INTO EACH EYE TWICE DAILY      ALPRAZolam (XANAX) 0 25 mg tablet Take 1 tablet (0 25 mg total) by mouth 2 (two) times a day as needed for anxiety for up to 10 days 30 tablet 0    amLODIPine (NORVASC) 5 mg tablet Take 1 tablet (5 mg total) by mouth daily 30 tablet 0    aspirin (ECOTRIN) 325 mg EC tablet Take 325 mg by mouth every 6 (six) hours as needed for mild pain      atorvastatin (LIPITOR) 10 mg tablet TAKE 1 TABLET BY MOUTH ONCE DAILY IN THE EVENING FOR CHOLESTEROL      docusate sodium (COLACE) 100 mg capsule Take 1 capsule (100 mg total) by mouth 2 (two) times a day 10 capsule 0    esomeprazole (NexIUM) 20 mg capsule Take 20 mg by mouth every morning before breakfast      latanoprost (XALATAN) 0 005 % ophthalmic solution 1 drop daily at bedtime      metoprolol tartrate (LOPRESSOR) 50 mg tablet Take 1 tablet (50 mg total) by mouth 2 (two) times a day (Patient taking differently: Take 50 mg by mouth daily ) 180 tablet 1    rasagiline (AZILECT) 1 MG Take 1 tablet (1 mg total) by mouth daily 90 each 3    enoxaparin (LOVENOX) 30 mg/0 3 mL Inject 0 3 mL (30 mg total) under the skin daily (Patient not taking: Reported on 2021) 9 mL 0    pravastatin (PRAVACHOL) 40 mg tablet Take 40 mg by mouth daily  (Patient not taking: Reported on 2021)      timolol (BETIMOL) 0 5 % ophthalmic solution Administer 1 drop to both eyes daily   (Patient not taking: Reported on 7/29/2021)       No current facility-administered medications for this visit  No Known Allergies    Review of Systems    Vitals:    07/29/21 0856   Weight: 66 7 kg (147 lb)   Height: 4' 11" (1 499 m)         {Time Spent:16118}    VIRTUAL VISIT DISCLAIMER      Bradley Gleason verbally agrees to participate in [employer]  Pt is aware that [employer] could be limited without vital signs or the ability to perform a full hands-on physical exam  Sonam Nayak Minors understands she or the provider may request at any time to terminate the video visit and request the patient to seek care or treatment in person

## 2021-08-02 ENCOUNTER — PATIENT OUTREACH (OUTPATIENT)
Dept: FAMILY MEDICINE CLINIC | Facility: CLINIC | Age: 76
End: 2021-08-02

## 2021-08-02 NOTE — PROGRESS NOTES
Outpatient Care Management Note:    Chart reviewed for outreach purposes  Adeel Etienne has not had her BMP blood work completed per epic  Called [facility] and spoke with [employer]  CM faxed the BMP order on 7/27  [employer] does not think they received the order  She will pull the order from epic  Adeel Etienne is scheduled for a visit tomorrow at [address] and nurse can draw the blood work at that time  CM called and spoke with Mr Marine Etienne was busy with physical therapy  I updated him about the BMP blood work to be drawn tomorrow  He is aware that it should be fasting  Mr Marine Padron was busy and could not talk  CM will follow up in a few days to be sure BMP was completed    [facility] text message sent to Enzo [facility]IR[facility] nurse, updating her about blood work and requesting she check in with the [facility] about her visit time tomorrow due to fasting lab

## 2021-08-03 ENCOUNTER — APPOINTMENT (OUTPATIENT)
Dept: LAB | Facility: CLINIC | Age: 76
End: 2021-08-03
Payer: MEDICARE

## 2021-08-03 DIAGNOSIS — S72.002A CLOSED DISPLACED FRACTURE OF LEFT FEMORAL NECK (HCC): ICD-10-CM

## 2021-08-03 LAB
ANION GAP SERPL CALCULATED.3IONS-SCNC: 9 MMOL/L (ref 4–13)
BUN SERPL-MCNC: 10 MG/DL (ref 5–25)
CALCIUM SERPL-MCNC: 8.6 MG/DL (ref 8.3–10.1)
CHLORIDE SERPL-SCNC: 100 MMOL/L (ref 100–108)
CO2 SERPL-SCNC: 26 MMOL/L (ref 21–32)
CREAT SERPL-MCNC: 0.69 MG/DL (ref 0.6–1.3)
GFR SERPL CREATININE-BSD FRML MDRD: 84 ML/MIN/1.73SQ M
GLUCOSE SERPL-MCNC: 82 MG/DL (ref 65–140)
POTASSIUM SERPL-SCNC: 3.9 MMOL/L (ref 3.5–5.3)
SODIUM SERPL-SCNC: 135 MMOL/L (ref 136–145)

## 2021-08-03 PROCEDURE — 36415 COLL VENOUS BLD VENIPUNCTURE: CPT

## 2021-08-03 PROCEDURE — 80048 BASIC METABOLIC PNL TOTAL CA: CPT

## 2021-08-05 ENCOUNTER — OFFICE VISIT (OUTPATIENT)
Dept: OBGYN CLINIC | Facility: CLINIC | Age: 76
End: 2021-08-05

## 2021-08-05 ENCOUNTER — HOSPITAL ENCOUNTER (OUTPATIENT)
Dept: RADIOLOGY | Facility: HOSPITAL | Age: 76
Discharge: HOME | End: 2021-08-05
Attending: ORTHOPAEDIC SURGERY
Payer: MEDICARE

## 2021-08-05 VITALS
BODY MASS INDEX: 29.64 KG/M2 | SYSTOLIC BLOOD PRESSURE: 110 MMHG | HEIGHT: 59 IN | WEIGHT: 147 LBS | DIASTOLIC BLOOD PRESSURE: 70 MMHG

## 2021-08-05 DIAGNOSIS — S72.002A CLOSED DISPLACED FRACTURE OF LEFT FEMORAL NECK (HCC): Primary | ICD-10-CM

## 2021-08-05 PROCEDURE — 73502 X-RAY EXAM HIP UNI 2-3 VIEWS: CPT

## 2021-08-05 PROCEDURE — 99024 POSTOP FOLLOW-UP VISIT: CPT | Performed by: ORTHOPAEDIC SURGERY

## 2021-08-05 NOTE — PROGRESS NOTES
JIM Marinelli  Attending, Orthopaedic Surgery  Foot and Ankle  [facility]      ORTHOPAEDIC FOOT AND ANKLE POST-OP VISIT     Procedure:     Left hip hemiarthroplasty       Date of surgery:   7/11/21    She was not discharged from the hospital with her abduction pillow  We will try to arrange to have an abduction pillow delivered to her house  She will need to wear the abduction pillow while in bed at night for 3 months  PLAN  1  Weightbearing Status- WBAT operative extremity  Continue posterior hip precautions  2  DVT prophylaxis- Lovenox 40mg Subcu Daily  3  Continue to elevate 23hrs/day getting up 1x per hour to prevent a blood clot  4  Pain control- OTC pain medication  5  RTC in 3 week(s)  6  Xrays needed next visit - yes left hip/pelvis    History of Present Illness:   Chief Complaint:   Chief Complaint   Patient presents with    Left Hip - Post-op     Cynthia Oseguera is a 64 y o  female who is being seen for post-operative visit for the above procedure  Pain is well controlled and the patient has successfully transitioned to OTC pain medicines  she is taking lovenox for DVT prophylaxis  Patient has been WBAT on the operative extremity  Review of Systems:  General- denies fever/chills  Respiratory- denies cough or SOB  Cardio- denies chest pain or palpitations  GI- denies abdominal pain  Musculoskeletal- Negative except noted above  Skin- denies rashes or wounds    Physical Exam:   /70   Ht 4' 11" (1 499 m)   Wt 66 7 kg (147 lb)   LMP  (LMP Unknown)   BMI 29 69 kg/m²   General/Constitutional: No apparent distress: well-nourished and well developed    Eyes: normal ocular motion  Lymphatic: No appreciable lymphadenopathy  Respiratory: Non-labored breathing  Vascular: No edema, swelling or tenderness, except as noted in detailed exam   Integumentary: No impressive skin lesions present, except as noted in detailed exam   Neuro: No ataxia or tremors noted  Psych: Normal mood and affect, oriented to person, place and time  Appropriate affect  Musculoskeletal: Normal, except as noted in detailed exam and in HPI  Examination    left        Incision Clean, dry, intact  Staples Removed this visit    Ecchymosis none    Swelling mild    Sensation Intact to light touch throughout sural, saphenous, superficial peroneal, deep peroneal and medial/lateral plantar nerve distributions  [place] 5 07 filament (10g) testing deferred  Cardiovascular Brisk capillary refill < 2 seconds,intact DP and PT pulses    Special Tests None      Imaging Studies:   X-ray machine is not functioning today so x-ray order will be placed and x-ray completed at the hospital    Scribe Attestation    I,:  Jane Lewis PA-C am acting as a scribe while in the presence of the attending physician :       I,:  Edin Block MD personally performed the services described in this documentation    as scribed in my presence :                 Malva Pay Lachman, MD  Foot & Ankle Surgery   Department of [address] [place] [address]      I personally performed the service  Malva Pay Lachman, MD

## 2021-08-05 NOTE — PATIENT INSTRUCTIONS
Continue abduction pillow at night while in bed  We will reach out to the physical therapist to get you one  Be sure to maintain Hip Precautions (no bending at waist, no crossing legs, on internally rotating surgical leg)    You may shower but do not submerge the incision under water in pool, tub or ocean yet

## 2021-08-09 ENCOUNTER — PATIENT OUTREACH (OUTPATIENT)
Dept: FAMILY MEDICINE CLINIC | Facility: CLINIC | Age: 76
End: 2021-08-09

## 2021-08-09 NOTE — PROGRESS NOTES
Outpatient Care Management Note:    Care manager attempted to call [facility]  Someone answered the phone, but then did not talk  CM will call back  Called back and [facility] answered the phone  She states that Mr Lasha Castellano went to the store  [facility] has not received her abduction pillow and was unable to answer any questions about it  She requested I call back later to speak with Mr Lasha Castellano  She states that he manages all of her medications and addresses all of her needs  CM called back and spoke with Mr Lasha Castellano  He states that [facility] did not get the abductor pillow and she does not want it  CM spoke directly with [facility] and she declined the pillow  She states that orthopedics is not aware of above  Mr Lasha Castellano also reinforced that [facility] is not taking the lovenox  CM will call orthopedics and request staff update Dr Ze Berg  [facility] declined ongoing outreach  She declined my contact information  She will call Dr Reyes's office if she has any questions or concerns

## 2021-08-23 ENCOUNTER — TELEPHONE (OUTPATIENT)
Dept: FAMILY MEDICINE CLINIC | Facility: CLINIC | Age: 76
End: 2021-08-23

## 2021-08-23 ENCOUNTER — HOSPITAL ENCOUNTER (EMERGENCY)
Facility: HOSPITAL | Age: 76
Discharge: HOME | End: 2021-08-23
Attending: EMERGENCY MEDICINE
Payer: MEDICARE

## 2021-08-23 ENCOUNTER — APPOINTMENT (EMERGENCY)
Dept: CT IMAGING | Facility: HOSPITAL | Age: 76
End: 2021-08-23
Payer: MEDICARE

## 2021-08-23 VITALS
RESPIRATION RATE: 18 BRPM | SYSTOLIC BLOOD PRESSURE: 192 MMHG | WEIGHT: 150 LBS | DIASTOLIC BLOOD PRESSURE: 83 MMHG | OXYGEN SATURATION: 97 % | BODY MASS INDEX: 30.3 KG/M2 | HEART RATE: 17 BPM | TEMPERATURE: 97 F

## 2021-08-23 DIAGNOSIS — K59.00 CONSTIPATION: Primary | ICD-10-CM

## 2021-08-23 LAB
ALBUMIN SERPL BCP-MCNC: 3.8 G/DL (ref 3.5–5)
ALP SERPL-CCNC: 118 U/L (ref 46–116)
ALT SERPL W P-5'-P-CCNC: 25 U/L (ref 12–78)
ANION GAP SERPL CALCULATED.3IONS-SCNC: 8 MMOL/L (ref 4–13)
AST SERPL W P-5'-P-CCNC: 14 U/L (ref 5–45)
BASOPHILS # BLD AUTO: 0.02 THOUSANDS/ΜL (ref 0–0.1)
BASOPHILS NFR BLD AUTO: 0 % (ref 0–1)
BILIRUB SERPL-MCNC: 0.7 MG/DL (ref 0.2–1)
BUN SERPL-MCNC: 6 MG/DL (ref 5–25)
CALCIUM SERPL-MCNC: 8.6 MG/DL (ref 8.3–10.1)
CHLORIDE SERPL-SCNC: 99 MMOL/L (ref 100–108)
CO2 SERPL-SCNC: 30 MMOL/L (ref 21–32)
CREAT SERPL-MCNC: 0.93 MG/DL (ref 0.6–1.3)
EOSINOPHIL # BLD AUTO: 0.03 THOUSAND/ΜL (ref 0–0.61)
EOSINOPHIL NFR BLD AUTO: 0 % (ref 0–6)
ERYTHROCYTE [DISTWIDTH] IN BLOOD BY AUTOMATED COUNT: 16.7 % (ref 11.6–15.1)
GFR SERPL CREATININE-BSD FRML MDRD: 59 ML/MIN/1.73SQ M
GLUCOSE SERPL-MCNC: 116 MG/DL (ref 65–140)
HCT VFR BLD AUTO: 33.6 % (ref 34.8–46.1)
HGB BLD-MCNC: 10.1 G/DL (ref 11.5–15.4)
IMM GRANULOCYTES # BLD AUTO: 0.03 THOUSAND/UL (ref 0–0.2)
IMM GRANULOCYTES NFR BLD AUTO: 0 % (ref 0–2)
LIPASE SERPL-CCNC: 94 U/L (ref 73–393)
LYMPHOCYTES # BLD AUTO: 0.98 THOUSANDS/ΜL (ref 0.6–4.47)
LYMPHOCYTES NFR BLD AUTO: 15 % (ref 14–44)
MCH RBC QN AUTO: 21.1 PG (ref 26.8–34.3)
MCHC RBC AUTO-ENTMCNC: 30.1 G/DL (ref 31.4–37.4)
MCV RBC AUTO: 70 FL (ref 82–98)
MONOCYTES # BLD AUTO: 0.48 THOUSAND/ΜL (ref 0.17–1.22)
MONOCYTES NFR BLD AUTO: 7 % (ref 4–12)
NEUTROPHILS # BLD AUTO: 5.17 THOUSANDS/ΜL (ref 1.85–7.62)
NEUTS SEG NFR BLD AUTO: 78 % (ref 43–75)
NRBC BLD AUTO-RTO: 0 /100 WBCS
PLATELET # BLD AUTO: 261 THOUSANDS/UL (ref 149–390)
PMV BLD AUTO: 11.6 FL (ref 8.9–12.7)
POTASSIUM SERPL-SCNC: 3.2 MMOL/L (ref 3.5–5.3)
PROT SERPL-MCNC: 6.6 G/DL (ref 6.4–8.2)
RBC # BLD AUTO: 4.78 MILLION/UL (ref 3.81–5.12)
SODIUM SERPL-SCNC: 137 MMOL/L (ref 136–145)
WBC # BLD AUTO: 6.71 THOUSAND/UL (ref 4.31–10.16)

## 2021-08-23 PROCEDURE — 36415 COLL VENOUS BLD VENIPUNCTURE: CPT | Performed by: EMERGENCY MEDICINE

## 2021-08-23 PROCEDURE — 80053 COMPREHEN METABOLIC PANEL: CPT | Performed by: EMERGENCY MEDICINE

## 2021-08-23 PROCEDURE — 74177 CT ABD & PELVIS W/CONTRAST: CPT

## 2021-08-23 PROCEDURE — G1004 CDSM NDSC: HCPCS

## 2021-08-23 PROCEDURE — 99284 EMERGENCY DEPT VISIT MOD MDM: CPT | Performed by: EMERGENCY MEDICINE

## 2021-08-23 PROCEDURE — 85025 COMPLETE CBC W/AUTO DIFF WBC: CPT | Performed by: EMERGENCY MEDICINE

## 2021-08-23 PROCEDURE — 83690 ASSAY OF LIPASE: CPT | Performed by: EMERGENCY MEDICINE

## 2021-08-23 PROCEDURE — 99284 EMERGENCY DEPT VISIT MOD MDM: CPT

## 2021-08-23 RX ORDER — SENNOSIDES 8.6 MG
8.6 TABLET ORAL
Qty: 14 TABLET | Refills: 0 | Status: SHIPPED | OUTPATIENT
Start: 2021-08-23 | End: 2021-09-06

## 2021-08-23 RX ORDER — SODIUM PHOSPHATE, DIBASIC AND SODIUM PHOSPHATE, MONOBASIC 7; 19 G/133ML; G/133ML
1 ENEMA RECTAL ONCE
Status: DISCONTINUED | OUTPATIENT
Start: 2021-08-23 | End: 2021-08-23

## 2021-08-23 RX ORDER — POLYETHYLENE GLYCOL 3350 17 G/17G
17 POWDER, FOR SOLUTION ORAL DAILY PRN
Qty: 238 G | Refills: 0 | Status: SHIPPED | OUTPATIENT
Start: 2021-08-23 | End: 2022-07-06

## 2021-08-23 RX ADMIN — IOHEXOL 100 ML: 350 INJECTION, SOLUTION INTRAVENOUS at 08:28

## 2021-08-23 NOTE — TELEPHONE ENCOUNTER
Thai Smith from Home [facility] called  He was asking for a verbal ok for patient to get 2 more weeks of PT  I told him I would leave a message and we would call him back  [phone]  Thank you

## 2021-08-23 NOTE — ED PROVIDER NOTES
History  Chief Complaint   Patient presents with    Constipation     Patient states that she has not had a bm for 4 days  Patient is a 64year old female with a past medical history significant for hypertension, Parkinson's disease with cognitive deficit, microcytic anemia, ambulatory dysfunction requiring a cane, chronic constipation who presents with constipation  Patient reports that she has been unable to have a bowel movement for 4-5 days  She states she had abdominal pain earlier, that she described as generalized, but otherwise was unable to fully describe it  Currently, she denies abdominal pain but states that she simply cannot have a bowel movement  Patient states she takes Colace, but that is not helping  Prior to Admission Medications   Prescriptions Last Dose Informant Patient Reported? Taking?    ALPRAZolam (XANAX) 0 25 mg tablet  Spouse/Significant Other No No   Sig: Take 1 tablet (0 25 mg total) by mouth 2 (two) times a day as needed for anxiety for up to 10 days   Alphagan P 0 1 %  Spouse/Significant Other Yes No   Sig: INSTILL 1 DROP INTO EACH EYE TWICE DAILY   acetaminophen (TYLENOL) 325 mg tablet  Spouse/Significant Other No No   Sig: Take 3 tablets (975 mg total) by mouth every 8 (eight) hours   amLODIPine (NORVASC) 5 mg tablet  Spouse/Significant Other No No   Sig: Take 1 tablet (5 mg total) by mouth daily   aspirin (ECOTRIN) 325 mg EC tablet  Spouse/Significant Other Yes No   Sig: Take 325 mg by mouth every 6 (six) hours as needed for mild pain   atorvastatin (LIPITOR) 10 mg tablet  Spouse/Significant Other Yes No   Sig: TAKE 1 TABLET BY MOUTH ONCE DAILY IN THE EVENING FOR CHOLESTEROL   docusate sodium (COLACE) 100 mg capsule  Spouse/Significant Other No No   Sig: Take 1 capsule (100 mg total) by mouth 2 (two) times a day   enoxaparin (LOVENOX) 30 mg/0 3 mL  Spouse/Significant Other No No   Sig: Inject 0 3 mL (30 mg total) under the skin daily   esomeprazole (NexIUM) 20 mg capsule  Spouse/Significant Other Yes No   Sig: Take 20 mg by mouth every morning before breakfast   latanoprost (XALATAN) 0 005 % ophthalmic solution  Spouse/Significant Other Yes No   Si drop daily at bedtime   metoprolol tartrate (LOPRESSOR) 50 mg tablet  Spouse/Significant Other No No   Sig: Take 1 tablet (50 mg total) by mouth 2 (two) times a day   Patient taking differently: Take 50 mg by mouth daily    pravastatin (PRAVACHOL) 40 mg tablet  Spouse/Significant Other Yes No   Sig: Take 40 mg by mouth daily    rasagiline (AZILECT) 1 MG  Spouse/Significant Other No No   Sig: Take 1 tablet (1 mg total) by mouth daily   timolol (BETIMOL) 0 5 % ophthalmic solution  Spouse/Significant Other Yes No   Sig: Administer 1 drop to both eyes daily       Facility-Administered Medications: None       Past Medical History:   Diagnosis Date    Constipation     Hyperlipidemia     Hypertension     Parkinson disease ([facility] [address] )        Past Surgical History:   Procedure Laterality Date     SECTION      CHOLECYSTECTOMY      NC PARTIAL HIP REPLACEMENT Left 2021    Procedure: HEMIARTHROPLASTY HIP (BIPOLAR); Surgeon: Sarita Garcia MD;  Location: [facility];  Service: Orthopedics       Family History   Problem Relation Age of Onset    Tremor Neg Hx     Parkinsonism Neg Hx      I have reviewed and agree with the history as documented      E-Cigarette/Vaping    E-Cigarette Use Never User      E-Cigarette/Vaping Substances    Nicotine No     THC No     CBD No     Flavoring No     Other No     Unknown No      Social History     Tobacco Use    Smoking status: Former Smoker     Packs/day: 0 25     Types: Cigarettes     Start date:      Quit date: 2011     Years since quitting: 10 0    Smokeless tobacco: Never Used   Vaping Use    Vaping Use: Never used   Substance Use Topics    Alcohol use: Yes     Comment: Socially    Drug use: No       Review of Systems   Constitutional: Negative for chills and fever    HENT: Negative for congestion and rhinorrhea  Eyes: Negative for photophobia and visual disturbance  Respiratory: Negative for cough and shortness of breath  Cardiovascular: Negative for chest pain and palpitations  Gastrointestinal: Positive for abdominal pain and constipation  Negative for abdominal distention, blood in stool, diarrhea, nausea and vomiting  Genitourinary: Negative for dysuria, flank pain and hematuria  Musculoskeletal: Negative for back pain and neck pain  Skin: Negative for color change and pallor  Neurological: Negative for dizziness, weakness, light-headedness, numbness and headaches  Physical Exam  Physical Exam  Vitals and nursing note reviewed  Constitutional:       General: She is not in acute distress  Appearance: Normal appearance  She is obese  She is not ill-appearing, toxic-appearing or diaphoretic  HENT:      Head: Normocephalic and atraumatic  Mouth/Throat:      Mouth: Mucous membranes are moist    Eyes:      Conjunctiva/sclera: Conjunctivae normal       Pupils: Pupils are equal, round, and reactive to light  Cardiovascular:      Rate and Rhythm: Normal rate and regular rhythm  Pulses: Normal pulses  Heart sounds: Normal heart sounds  No murmur heard  Pulmonary:      Effort: Pulmonary effort is normal  No respiratory distress  Breath sounds: Normal breath sounds  No stridor  No wheezing, rhonchi or rales  Chest:      Chest wall: No tenderness  Abdominal:      General: Abdomen is protuberant  Bowel sounds are normal  There is no distension  Palpations: Abdomen is soft  Tenderness: There is no abdominal tenderness  There is no right CVA tenderness, left CVA tenderness, guarding or rebound  Negative signs include Morgan's sign, Rovsing's sign, McBurney's sign and psoas sign  Musculoskeletal:      Cervical back: Neck supple  Right lower leg: No edema  Left lower leg: No edema     Skin: General: Skin is warm and dry  Neurological:      General: No focal deficit present  Mental Status: She is alert and oriented to person, place, and time  Mental status is at baseline     Psychiatric:         Mood and Affect: Mood normal          Behavior: Behavior normal          Vital Signs  ED Triage Vitals   Temperature Pulse Respirations Blood Pressure SpO2   08/23/21 0729 08/23/21 0729 08/23/21 0729 08/23/21 0729 08/23/21 0729   (!) 97 °F (36 1 °C) 72 18 (!) 176/81 94 %      Temp Source Heart Rate Source Patient Position - Orthostatic VS BP Location FiO2 (%)   08/23/21 0729 08/23/21 1000 08/23/21 0729 08/23/21 0729 --   Temporal Monitor Lying Right arm       Pain Score       --                  Vitals:    08/23/21 0729 08/23/21 0800 08/23/21 1000   BP: (!) 176/81 (!) 180/81 (!) 192/83   Pulse: 72 64 (!) 17   Patient Position - Orthostatic VS: Lying           Visual Acuity      ED Medications  Medications   iohexol (OMNIPAQUE) 350 MG/ML injection (SINGLE-DOSE) 100 mL (100 mL Intravenous Given 8/23/21 0828)       Diagnostic Studies  Results Reviewed     Procedure Component Value Units Date/Time    Comprehensive metabolic panel [[id number]]  (Abnormal) Collected: 08/23/21 0742    Lab Status: Final result Specimen: Blood from Arm, Left Updated: 08/23/21 0809     Sodium 137 mmol/L      Potassium 3 2 mmol/L      Chloride 99 mmol/L      CO2 30 mmol/L      ANION GAP 8 mmol/L      BUN 6 mg/dL      Creatinine 0 93 mg/dL      Glucose 116 mg/dL      Calcium 8 6 mg/dL      AST 14 U/L      ALT 25 U/L      Alkaline Phosphatase 118 U/L      Total Protein 6 6 g/dL      Albumin 3 8 g/dL      Total Bilirubin 0 70 mg/dL      eGFR 59 ml/min/1 73sq m     Narrative:      Paula guidelines for Chronic Kidney Disease (CKD):     Stage 1 with normal or high GFR (GFR > 90 mL/min/1 73 square meters)    Stage 2 Mild CKD (GFR = 60-89 mL/min/1 73 square meters)    Stage 3A Moderate CKD (GFR = 45-59 mL/min/1 73 square meters)    Stage 3B Moderate CKD (GFR = 30-44 mL/min/1 73 square meters)    Stage 4 Severe CKD (GFR = 15-29 mL/min/1 73 square meters)    Stage 5 End Stage CKD (GFR <15 mL/min/1 73 square meters)  Note: GFR calculation is accurate only with a steady state creatinine    Lipase [[id number]]  (Normal) Collected: 08/23/21 0742    Lab Status: Final result Specimen: Blood from Arm, Left Updated: 08/23/21 0809     Lipase 94 u/L     CBC and differential [[id number]]  (Abnormal) Collected: 08/23/21 0742    Lab Status: Final result Specimen: Blood from Arm, Left Updated: 08/23/21 0747     WBC 6 71 Thousand/uL      RBC 4 78 Million/uL      Hemoglobin 10 1 g/dL      Hematocrit 33 6 %      MCV 70 fL      MCH 21 1 pg      MCHC 30 1 g/dL      RDW 16 7 %      MPV 11 6 fL      Platelets 333 Thousands/uL      nRBC 0 /100 WBCs      Neutrophils Relative 78 %      Immat GRANS % 0 %      Lymphocytes Relative 15 %      Monocytes Relative 7 %      Eosinophils Relative 0 %      Basophils Relative 0 %      Neutrophils Absolute 5 17 Thousands/µL      Immature Grans Absolute 0 03 Thousand/uL      Lymphocytes Absolute 0 98 Thousands/µL      Monocytes Absolute 0 48 Thousand/µL      Eosinophils Absolute 0 03 Thousand/µL      Basophils Absolute 0 02 Thousands/µL                  CT abdomen pelvis with contrast   Final Result by Becka Deng MD (08/23 1756)      Constipation  Very large bolus of stool in the rectum with mild surrounding perirectal inflammatory edema consistent with stercoral proctitis and possibly fecal impaction  No bowel obstruction  Workstation performed: [id number]                    Procedures  Procedures         ED Course  ED Course as of Aug 23 1026   Mon Aug 23, 2021   2881 Potassium(!): 3 2   0859 Constipation  Very large bolus of stool in the rectum with mild surrounding perirectal inflammatory edema consistent with stercoral proctitis and possibly fecal impaction        2143 Will attempt to manually disimpact and will need to perform a soap suds enema  4250 Attempted manual disimpaction  Was able to get a tiny amount of stool out, but unfortunately the majority is just past my reach  Will have patient try and have a BM right now, otherwise will need the soap suds enema  1023 Prior to soap suds enema patient actually had 2 BMs  Will discharge with miralax and senna prescriptions to help with the constipation                                SBIRT 22yo+      Most Recent Value   SBIRT (24 yo +)   In order to provide better care to our patients, we are screening all of our patients for alcohol and drug use  Would it be okay to ask you these screening questions? No Filed at: 08/23/2021 7973                    MDM  Number of Diagnoses or Management Options  Diagnosis management comments: Assessment and plan:  57-year-old female presenting with constipation and abdominal pain  Will get lab work to evaluate for electrolyte abnormalities, leukocytosis, kidney and liver function, CT scan of the abdomen and pelvis to rule out obstruction  If no obstruction, will treat constipation  Disposition  Final diagnoses:   Constipation     Time reflects when diagnosis was documented in both MDM as applicable and the Disposition within this note     Time User Action Codes Description Comment    8/23/2021 10:24 AM Rose Marie Simms Add [K59 00] Constipation       ED Disposition     ED Disposition Condition Date/Time Comment    Discharge Stable Mon Aug 23, 2021 10:24 AM Herb Eduardo discharge to home/self care              Follow-up Information     Follow up With Specialties Details Why Contact Info Additional Information    Wang Sams DO Family Medicine Schedule an appointment as soon as possible for a visit in 3 days for re-evaluation [address] [facility] [address]  [facility] [phone]        [address] Emergency Department Emergency Medicine Go to  As needed, If symptoms worsen, for re-evaluation [address] [place],[address] [id number]  [address] [facility] Emergency Department, [address] [place], [facility] [place] [address]          Patient's Medications   Discharge Prescriptions    POLYETHYLENE GLYCOL (MIRALAX) 17 G PACKET    Take 17 g by mouth daily as needed (constipation) for up to 14 days       Start Date: 8/23/2021 End Date: 9/6/2021       Order Dose: 17 g       Quantity: 238 g    Refills: 0    SENNA (SENOKOT) 8 6 MG    Take 1 tablet (8 6 mg total) by mouth daily at bedtime for 14 days       Start Date: 8/23/2021 End Date: 9/6/2021       Order Dose: 8 6 mg       Quantity: 14 tablet    Refills: 0     No discharge procedures on file      PDMP Review       Value Time User    PDMP Reviewed  Yes 7/11/2021 10:53 AM Danny Santana MD          ED Provider  Electronically Signed by           Anna Barney DO  08/23/21 9785

## 2021-08-24 ENCOUNTER — PATIENT OUTREACH (OUTPATIENT)
Dept: FAMILY MEDICINE CLINIC | Facility: CLINIC | Age: 76
End: 2021-08-24

## 2021-08-24 NOTE — PROGRESS NOTES
Outpatient Care Management Note:    Care manager called Callie Padilla and Mr Fay Chaudhari answered the phone  He states that Callie Padilla is doing fine  She was in the ER, because she had difficulty moving her bowels  She is going now  Mr Fay Chaudhari states he is aware that she was ordered the miralax and senna  They will monitor her bowels closely and call Dr Reyes with any questions  He denies any issues, concerns, or current needs  [facility] had declined ongoing outreach prior  CM will continue to follow, without outreach, until the [facility] closure

## 2021-08-25 DIAGNOSIS — E78.5 HYPERLIPIDEMIA, UNSPECIFIED HYPERLIPIDEMIA TYPE: Primary | ICD-10-CM

## 2021-08-25 DIAGNOSIS — G20 PARKINSON DISEASE (HCC): ICD-10-CM

## 2021-08-25 DIAGNOSIS — I10 ESSENTIAL HYPERTENSION: ICD-10-CM

## 2021-08-25 RX ORDER — ATORVASTATIN CALCIUM 10 MG/1
TABLET, FILM COATED ORAL
Qty: 90 TABLET | Refills: 1 | Status: SHIPPED | OUTPATIENT
Start: 2021-08-25 | End: 2022-03-09 | Stop reason: SDUPTHER

## 2021-08-25 RX ORDER — AMLODIPINE BESYLATE 5 MG/1
5 TABLET ORAL DAILY
Qty: 90 TABLET | Refills: 1 | Status: SHIPPED | OUTPATIENT
Start: 2021-08-25 | End: 2022-03-09 | Stop reason: SDUPTHER

## 2021-08-25 RX ORDER — RASAGILINE 1 MG/1
1 TABLET ORAL DAILY
Qty: 90 TABLET | Refills: 1 | Status: SHIPPED | OUTPATIENT
Start: 2021-08-25 | End: 2022-02-28 | Stop reason: SDUPTHER

## 2021-08-25 NOTE — TELEPHONE ENCOUNTER
Patient's , Jacinto Fry, called and asked for the following prescriptions be refilled for patient at [facility] [place]  [facility] [place], [facility]  amLODIPine (NORVASC) 5 mg tablet  atorvastatin (LIPITOR) 10 mg tablet  rasagiline (AZILECT) 1 MG    [phone]      Thank you

## 2021-08-26 ENCOUNTER — OFFICE VISIT (OUTPATIENT)
Dept: OBGYN CLINIC | Facility: CLINIC | Age: 76
End: 2021-08-26

## 2021-08-26 ENCOUNTER — APPOINTMENT (OUTPATIENT)
Dept: RADIOLOGY | Facility: CLINIC | Age: 76
End: 2021-08-26
Payer: MEDICARE

## 2021-08-26 VITALS
SYSTOLIC BLOOD PRESSURE: 118 MMHG | HEIGHT: 59 IN | BODY MASS INDEX: 30.24 KG/M2 | DIASTOLIC BLOOD PRESSURE: 70 MMHG | WEIGHT: 150 LBS

## 2021-08-26 DIAGNOSIS — S72.002A CLOSED DISPLACED FRACTURE OF LEFT FEMORAL NECK (HCC): Primary | ICD-10-CM

## 2021-08-26 DIAGNOSIS — S72.002A CLOSED DISPLACED FRACTURE OF LEFT FEMORAL NECK (HCC): ICD-10-CM

## 2021-08-26 PROCEDURE — 99024 POSTOP FOLLOW-UP VISIT: CPT | Performed by: ORTHOPAEDIC SURGERY

## 2021-08-26 PROCEDURE — 73502 X-RAY EXAM HIP UNI 2-3 VIEWS: CPT

## 2021-08-26 NOTE — PROGRESS NOTES
JIM Marinelli  Attending, Orthopaedic Surgery  Foot and Ankle  [address] [facility]      ORTHOPAEDIC FOOT AND ANKLE POST-OP VISIT     Procedure:     Left hip hemiarthroplasty       Date of surgery:   7/11/21  Patient has not received a abduction pillow, however she states using a regular pillow at night  PLAN  1  Weightbearing Status- WBAT operative extremity  2  DVT prophylaxis- Lovenox 40mg Subcu Daily may discontinue  3  Continue hip abduction pillow at night time  4  Pain control- OTC pain medication  5  RTC in 6 weeks  6  Xrays needed next visit - yes left hip and pelvis    History of Present Illness:   Chief Complaint: left hip post op     Cynthia Oseguera is a 64 y o  female who is being seen for post-operative visit for the above procedure  Pain is well controlled and the patient has successfully transitioned to OTC pain medicines  she is taking Lovenox 40 mg subcu for DVT prophylaxis  Patient has been WBAT on operative extremity  Review of Systems:  General- denies fever/chills  Respiratory- denies cough or SOB  Cardio- denies chest pain or palpitations  GI- denies abdominal pain  Musculoskeletal- Negative except noted above  Skin- denies rashes or wounds    Physical Exam:   LMP  (LMP Unknown)   General/Constitutional: No apparent distress: well-nourished and well developed  Eyes: normal ocular motion  Lymphatic: No appreciable lymphadenopathy  Respiratory: Non-labored breathing  Vascular: No edema, swelling or tenderness, except as noted in detailed exam   Integumentary: No impressive skin lesions present, except as noted in detailed exam   Neuro: No ataxia or tremors noted  Psych: Normal mood and affect, oriented to person, place and time  Appropriate affect  Musculoskeletal: Normal, except as noted in detailed exam and in HPI  Examination    left        Incision Clean, dry, intact  Sutures Previously removed      Ecchymosis none    Swelling Mild    Sensation Intact to light touch throughout sural, saphenous, superficial peroneal, deep peroneal and medial/lateral plantar nerve distributions  [place]-Jigna 5 07 filament (10g) testing deferred  Cardiovascular Brisk capillary refill < 2 seconds,intact DP and PT pulses    Special Tests None      Imaging Studies:   3 views of the left hip were taken, reviewed and interpreted independently that demonstrate stable and intact prothesis without evidence of loosening or displacment  Reviewed by me personally  Halbert Blalock Lachman, MD  Foot & Ankle Surgery   Department of [address] [place] [address]      I personally performed the service  Halbert Blalock Lachman, MD    Scribe Attestation    I,:  Salomón Lezama MA am acting as a scribe while in the presence of the attending physician :       I,:  Karissa Hodgson MD personally performed the services described in this documentation    as scribed in my presence :

## 2021-08-26 NOTE — PATIENT INSTRUCTIONS
Elevation, Ice and tylenol and staying off of it at night will be important to aide in this transition out of the boot  Swelling and soreness are normal as you begin to do more with the injured leg  May DC aspirin/lovenox, no longer needed  May shower, do not soak in a tub/pool/ocean/etc for another 1 weeks  Continue PT  Scar massage- pea sized amount of lotion, massage into scar for 5 minutes each day  Continue  pillow at night and laying on her back  Recommend taking the following supplements: Vitamin D 4000 units per day and Calcium 1200 mg per day  This will help with bone healing

## 2021-09-15 ENCOUNTER — TELEPHONE (OUTPATIENT)
Dept: NEUROLOGY | Facility: CLINIC | Age: 76
End: 2021-09-15

## 2021-09-15 NOTE — TELEPHONE ENCOUNTER
Spoke with patient's  to reschedule her appointment with Tatiana Lees  He ask that we call back later today

## 2021-09-24 DIAGNOSIS — F41.9 CHRONIC ANXIETY: ICD-10-CM

## 2021-09-24 RX ORDER — ALPRAZOLAM 0.25 MG/1
TABLET ORAL
Qty: 60 TABLET | Refills: 0 | Status: SHIPPED | OUTPATIENT
Start: 2021-09-24

## 2021-09-24 NOTE — TELEPHONE ENCOUNTER
Aida Cuadra called for patient to have ALPRAZolam Budanya Cordova) 0 25 mg tablet refilled at [address] [place] [address],[address] [place], [place] - [address] [place] [address]

## 2021-10-11 ENCOUNTER — PATIENT OUTREACH (OUTPATIENT)
Dept: FAMILY MEDICINE CLINIC | Facility: CLINIC | Age: 76
End: 2021-10-11

## 2021-10-12 ENCOUNTER — TELEPHONE (OUTPATIENT)
Dept: NEUROLOGY | Facility: CLINIC | Age: 76
End: 2021-10-12

## 2021-10-12 NOTE — TELEPHONE ENCOUNTER
Patient of Juan Krishna, last office visit May 27   calling (Edison Ma)  On behalf of wife  He said patient's physical therapist is recommending she restart sinemet due to increased shaking and difficulty walking; feels would have greater improvement with therapy if on medication  Patient is receptive to restarting: please provide input on dosing   said she stopped due to side effects months ago  As she is "fussy" and doesn't like taking medications  Thank you  best cb [phone] ( okay to leave detailed message)

## 2021-10-12 NOTE — TELEPHONE ENCOUNTER
That would be fine  She can start and increase as follows: Will have her start by taking Sinemet  mg   1/2 pill in the morning for 1 week,   then 1/2 pill in the morning and 1/2 pill in the afternoon for 1 week,   then 1/2 in the morning 1/2 pill in the afternoon and 1/2 in the evening for 1 week,   then 1 pill in the morning 1/2 pill in the afternoon and 1/2 pill in the evening for 1 week,   then 1 pill in the morning 1 pill in the afternoon 1/2 pill in the evening for 1 week   then 1 pill 3 times a day  Script sent in  Thanks!

## 2021-10-14 ENCOUNTER — HOSPITAL ENCOUNTER (OUTPATIENT)
Dept: RADIOLOGY | Facility: HOSPITAL | Age: 76
Discharge: HOME | End: 2021-10-14
Attending: ORTHOPAEDIC SURGERY
Payer: MEDICARE

## 2021-10-14 ENCOUNTER — OFFICE VISIT (OUTPATIENT)
Dept: OBGYN CLINIC | Facility: CLINIC | Age: 76
End: 2021-10-14
Payer: MEDICARE

## 2021-10-14 VITALS — WEIGHT: 150 LBS | HEIGHT: 59 IN | BODY MASS INDEX: 30.24 KG/M2

## 2021-10-14 DIAGNOSIS — S72.002A CLOSED DISPLACED FRACTURE OF LEFT FEMORAL NECK (HCC): ICD-10-CM

## 2021-10-14 DIAGNOSIS — S72.002A CLOSED DISPLACED FRACTURE OF LEFT FEMORAL NECK (HCC): Primary | ICD-10-CM

## 2021-10-14 PROCEDURE — 73502 X-RAY EXAM HIP UNI 2-3 VIEWS: CPT

## 2021-10-14 PROCEDURE — 99213 OFFICE O/P EST LOW 20 MIN: CPT | Performed by: ORTHOPAEDIC SURGERY

## 2021-11-04 ENCOUNTER — OFFICE VISIT (OUTPATIENT)
Dept: NEUROLOGY | Facility: CLINIC | Age: 76
End: 2021-11-04
Payer: MEDICARE

## 2021-11-04 ENCOUNTER — TELEPHONE (OUTPATIENT)
Dept: NEUROLOGY | Facility: CLINIC | Age: 76
End: 2021-11-04

## 2021-11-04 VITALS — HEART RATE: 63 BPM | SYSTOLIC BLOOD PRESSURE: 100 MMHG | DIASTOLIC BLOOD PRESSURE: 60 MMHG

## 2021-11-04 DIAGNOSIS — G20 PARKINSON DISEASE (HCC): Primary | ICD-10-CM

## 2021-11-04 PROCEDURE — 99214 OFFICE O/P EST MOD 30 MIN: CPT | Performed by: PHYSICIAN ASSISTANT

## 2021-11-08 ENCOUNTER — APPOINTMENT (EMERGENCY)
Dept: CT IMAGING | Facility: HOSPITAL | Age: 76
DRG: 640 | End: 2021-11-08
Payer: MEDICARE

## 2021-11-08 ENCOUNTER — APPOINTMENT (EMERGENCY)
Dept: RADIOLOGY | Facility: HOSPITAL | Age: 76
DRG: 640 | End: 2021-11-08
Payer: MEDICARE

## 2021-11-08 ENCOUNTER — HOSPITAL ENCOUNTER (INPATIENT)
Facility: HOSPITAL | Age: 76
LOS: 3 days | Discharge: NURSING, REHAB OR LONG-TERM CARE | DRG: 640 | End: 2021-11-11
Attending: EMERGENCY MEDICINE | Admitting: INTERNAL MEDICINE
Payer: MEDICARE

## 2021-11-08 DIAGNOSIS — I48.92 ATRIAL FLUTTER (HCC): ICD-10-CM

## 2021-11-08 DIAGNOSIS — G20 PARKINSON DISEASE (HCC): ICD-10-CM
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
## 2021-11-08 DIAGNOSIS — E04.1 THYROID NODULE: ICD-10-CM

## 2021-11-08 DIAGNOSIS — R55 SYNCOPE: ICD-10-CM

## 2021-11-08 DIAGNOSIS — S09.90XA HEAD INJURY: ICD-10-CM

## 2021-11-08 LAB
2HR DELTA HS TROPONIN: 0 NG/L
4HR DELTA HS TROPONIN: 0 NG/L
ALBUMIN SERPL BCP-MCNC: 3.4 G/DL (ref 3.5–5)
ALP SERPL-CCNC: 104 U/L (ref 46–116)
ALT SERPL W P-5'-P-CCNC: 12 U/L (ref 12–78)
ANION GAP SERPL CALCULATED.3IONS-SCNC: 8 MMOL/L (ref 4–13)
APTT PPP: 22 SECONDS (ref 23–37)
AST SERPL W P-5'-P-CCNC: 18 U/L (ref 5–45)
BASOPHILS # BLD AUTO: 0.01 THOUSANDS/ΜL (ref 0–0.1)
BASOPHILS NFR BLD AUTO: 0 % (ref 0–1)
BILIRUB SERPL-MCNC: 1 MG/DL (ref 0.2–1)
BILIRUB UR QL STRIP: NEGATIVE
BUN SERPL-MCNC: 9 MG/DL (ref 5–25)
CALCIUM ALBUM COR SERPL-MCNC: 8.8 MG/DL (ref 8.3–10.1)
CALCIUM SERPL-MCNC: 8.3 MG/DL (ref 8.3–10.1)
CARDIAC TROPONIN I PNL SERPL HS: 4 NG/L
CHLORIDE SERPL-SCNC: 98 MMOL/L (ref 100–108)
CLARITY UR: CLEAR
CO2 SERPL-SCNC: 29 MMOL/L (ref 21–32)
COLOR UR: YELLOW
CREAT SERPL-MCNC: 1.01 MG/DL (ref 0.6–1.3)
EOSINOPHIL # BLD AUTO: 0.04 THOUSAND/ΜL (ref 0–0.61)
EOSINOPHIL NFR BLD AUTO: 1 % (ref 0–6)
ERYTHROCYTE [DISTWIDTH] IN BLOOD BY AUTOMATED COUNT: 19.3 % (ref 11.6–15.1)
GFR SERPL CREATININE-BSD FRML MDRD: 53 ML/MIN/1.73SQ M
GLUCOSE SERPL-MCNC: 129 MG/DL (ref 65–140)
GLUCOSE UR STRIP-MCNC: NEGATIVE MG/DL
HCT VFR BLD AUTO: 32.2 % (ref 34.8–46.1)
HGB BLD-MCNC: 9.9 G/DL (ref 11.5–15.4)
HGB UR QL STRIP.AUTO: NEGATIVE
IMM GRANULOCYTES # BLD AUTO: 0.05 THOUSAND/UL (ref 0–0.2)
IMM GRANULOCYTES NFR BLD AUTO: 1 % (ref 0–2)
INR PPP: 1.01 (ref 0.84–1.19)
KETONES UR STRIP-MCNC: NEGATIVE MG/DL
LEUKOCYTE ESTERASE UR QL STRIP: NEGATIVE
LYMPHOCYTES # BLD AUTO: 1.1 THOUSANDS/ΜL (ref 0.6–4.47)
LYMPHOCYTES NFR BLD AUTO: 16 % (ref 14–44)
MCH RBC QN AUTO: 20.7 PG (ref 26.8–34.3)
MCHC RBC AUTO-ENTMCNC: 30.7 G/DL (ref 31.4–37.4)
MCV RBC AUTO: 67 FL (ref 82–98)
MONOCYTES # BLD AUTO: 0.5 THOUSAND/ΜL (ref 0.17–1.22)
MONOCYTES NFR BLD AUTO: 7 % (ref 4–12)
NEUTROPHILS # BLD AUTO: 5.18 THOUSANDS/ΜL (ref 1.85–7.62)
NEUTS SEG NFR BLD AUTO: 75 % (ref 43–75)
NITRITE UR QL STRIP: NEGATIVE
NRBC BLD AUTO-RTO: 0 /100 WBCS
PH UR STRIP.AUTO: 7 [PH]
PLATELET # BLD AUTO: 249 THOUSANDS/UL (ref 149–390)
PMV BLD AUTO: 10.3 FL (ref 8.9–12.7)
POTASSIUM SERPL-SCNC: 3.5 MMOL/L (ref 3.5–5.3)
PROT SERPL-MCNC: 6.3 G/DL (ref 6.4–8.2)
PROT UR STRIP-MCNC: NEGATIVE MG/DL
PROTHROMBIN TIME: 13.2 SECONDS (ref 11.6–14.5)
RBC # BLD AUTO: 4.79 MILLION/UL (ref 3.81–5.12)
SODIUM SERPL-SCNC: 135 MMOL/L (ref 136–145)
SP GR UR STRIP.AUTO: 1.01 (ref 1–1.03)
UROBILINOGEN UR QL STRIP.AUTO: 1 E.U./DL
WBC # BLD AUTO: 6.88 THOUSAND/UL (ref 4.31–10.16)

## 2021-11-08 PROCEDURE — 85610 PROTHROMBIN TIME: CPT | Performed by: PHYSICIAN ASSISTANT

## 2021-11-08 PROCEDURE — 72125 CT NECK SPINE W/O DYE: CPT

## 2021-11-08 PROCEDURE — 80053 COMPREHEN METABOLIC PANEL: CPT | Performed by: PHYSICIAN ASSISTANT

## 2021-11-08 PROCEDURE — 99285 EMERGENCY DEPT VISIT HI MDM: CPT

## 2021-11-08 PROCEDURE — 84484 ASSAY OF TROPONIN QUANT: CPT | Performed by: PHYSICIAN ASSISTANT

## 2021-11-08 PROCEDURE — 70450 CT HEAD/BRAIN W/O DYE: CPT

## 2021-11-08 PROCEDURE — 85730 THROMBOPLASTIN TIME PARTIAL: CPT | Performed by: PHYSICIAN ASSISTANT

## 2021-11-08 PROCEDURE — 81003 URINALYSIS AUTO W/O SCOPE: CPT | Performed by: PHYSICIAN ASSISTANT

## 2021-11-08 PROCEDURE — 36415 COLL VENOUS BLD VENIPUNCTURE: CPT | Performed by: PHYSICIAN ASSISTANT

## 2021-11-08 PROCEDURE — 99223 1ST HOSP IP/OBS HIGH 75: CPT | Performed by: STUDENT IN AN ORGANIZED HEALTH CARE EDUCATION/TRAINING PROGRAM

## 2021-11-08 PROCEDURE — 85025 COMPLETE CBC W/AUTO DIFF WBC: CPT | Performed by: PHYSICIAN ASSISTANT

## 2021-11-08 PROCEDURE — 93005 ELECTROCARDIOGRAM TRACING: CPT

## 2021-11-08 PROCEDURE — 96360 HYDRATION IV INFUSION INIT: CPT

## 2021-11-08 PROCEDURE — 71045 X-RAY EXAM CHEST 1 VIEW: CPT

## 2021-11-08 PROCEDURE — 99285 EMERGENCY DEPT VISIT HI MDM: CPT | Performed by: PHYSICIAN ASSISTANT

## 2021-11-08 RX ORDER — BRIMONIDINE TARTRATE 2 MG/ML
1 SOLUTION/ DROPS OPHTHALMIC EVERY 8 HOURS SCHEDULED
Status: DISCONTINUED | OUTPATIENT
Start: 2021-11-08 | End: 2021-11-11 | Stop reason: HOSPADM

## 2021-11-08 RX ORDER — AMLODIPINE BESYLATE 5 MG/1
5 TABLET ORAL DAILY
Status: DISCONTINUED | OUTPATIENT
Start: 2021-11-09 | End: 2021-11-11 | Stop reason: HOSPADM

## 2021-11-08 RX ORDER — PANTOPRAZOLE SODIUM 40 MG/1
40 TABLET, DELAYED RELEASE ORAL
Status: DISCONTINUED | OUTPATIENT
Start: 2021-11-09 | End: 2021-11-11 | Stop reason: HOSPADM

## 2021-11-08 RX ORDER — ASPIRIN 325 MG
325 TABLET, DELAYED RELEASE (ENTERIC COATED) ORAL EVERY 6 HOURS PRN
Status: DISCONTINUED | OUTPATIENT
Start: 2021-11-08 | End: 2021-11-08

## 2021-11-08 RX ORDER — SELEGILINE HYDROCHLORIDE 5 MG/1
5 TABLET ORAL
Status: DISCONTINUED | OUTPATIENT
Start: 2021-11-09 | End: 2021-11-11 | Stop reason: HOSPADM

## 2021-11-08 RX ORDER — SODIUM CHLORIDE 9 MG/ML
75 INJECTION, SOLUTION INTRAVENOUS CONTINUOUS
Status: DISCONTINUED | OUTPATIENT
Start: 2021-11-08 | End: 2021-11-10

## 2021-11-08 RX ORDER — ACETAMINOPHEN 325 MG/1
650 TABLET ORAL EVERY 6 HOURS PRN
Status: DISCONTINUED | OUTPATIENT
Start: 2021-11-08 | End: 2021-11-11 | Stop reason: HOSPADM

## 2021-11-08 RX ORDER — ATORVASTATIN CALCIUM 10 MG/1
10 TABLET, FILM COATED ORAL
Status: DISCONTINUED | OUTPATIENT
Start: 2021-11-08 | End: 2021-11-11 | Stop reason: HOSPADM

## 2021-11-08 RX ORDER — TIMOLOL MALEATE 5 MG/ML
1 SOLUTION/ DROPS OPHTHALMIC DAILY
Status: DISCONTINUED | OUTPATIENT
Start: 2021-11-09 | End: 2021-11-11 | Stop reason: HOSPADM

## 2021-11-08 RX ORDER — METOPROLOL TARTRATE 50 MG/1
50 TABLET, FILM COATED ORAL 2 TIMES DAILY
Status: DISCONTINUED | OUTPATIENT
Start: 2021-11-08 | End: 2021-11-11 | Stop reason: HOSPADM

## 2021-11-08 RX ORDER — ALPRAZOLAM 0.25 MG/1
0.25 TABLET ORAL 2 TIMES DAILY PRN
Status: DISCONTINUED | OUTPATIENT
Start: 2021-11-08 | End: 2021-11-11 | Stop reason: HOSPADM

## 2021-11-08 RX ORDER — LATANOPROST 50 UG/ML
1 SOLUTION/ DROPS OPHTHALMIC
Status: DISCONTINUED | OUTPATIENT
Start: 2021-11-08 | End: 2021-11-11 | Stop reason: HOSPADM

## 2021-11-08 RX ORDER — ONDANSETRON 2 MG/ML
4 INJECTION INTRAMUSCULAR; INTRAVENOUS EVERY 6 HOURS PRN
Status: DISCONTINUED | OUTPATIENT
Start: 2021-11-08 | End: 2021-11-11 | Stop reason: HOSPADM

## 2021-11-08 RX ADMIN — ATORVASTATIN CALCIUM 10 MG: 10 TABLET, FILM COATED ORAL at 17:57

## 2021-11-08 RX ADMIN — METOPROLOL TARTRATE 50 MG: 50 TABLET, FILM COATED ORAL at 20:01

## 2021-11-08 RX ADMIN — LATANOPROST 1 DROP: 50 SOLUTION OPHTHALMIC at 23:17

## 2021-11-08 RX ADMIN — BRIMONIDINE TARTRATE 1 DROP: 2 SOLUTION OPHTHALMIC at 18:36

## 2021-11-08 RX ADMIN — SODIUM CHLORIDE 1000 ML: 0.9 INJECTION, SOLUTION INTRAVENOUS at 12:26

## 2021-11-08 RX ADMIN — SODIUM CHLORIDE 125 ML/HR: 0.9 INJECTION, SOLUTION INTRAVENOUS at 16:06

## 2021-11-08 RX ADMIN — BRIMONIDINE TARTRATE 1 DROP: 2 SOLUTION OPHTHALMIC at 23:17

## 2021-11-08 RX ADMIN — CARBIDOPA AND LEVODOPA 1.5 TABLET: 25; 100 TABLET ORAL at 20:01

## 2021-11-08 RX ADMIN — CARBIDOPA AND LEVODOPA 1.5 TABLET: 25; 100 TABLET ORAL at 23:18

## 2021-11-09 ENCOUNTER — APPOINTMENT (INPATIENT)
Dept: NON INVASIVE DIAGNOSTICS | Facility: HOSPITAL | Age: 76
DRG: 640 | End: 2021-11-09
Payer: MEDICARE

## 2021-11-09 PROBLEM — I49.9 ARRHYTHMIA: Status: ACTIVE | Noted: 2021-11-08

## 2021-11-09 PROBLEM — R42 LIGHTHEADEDNESS: Status: ACTIVE | Noted: 2021-11-09

## 2021-11-09 LAB
ANION GAP SERPL CALCULATED.3IONS-SCNC: 8 MMOL/L (ref 4–13)
AORTIC ROOT: 2.8 CM
AORTIC VALVE MEAN VELOCITY: 8.1 M/S
AV LVOT MEAN GRADIENT: 1 MMHG
AV LVOT PEAK GRADIENT: 2 MMHG
AV MEAN GRADIENT: 3 MMHG
AV PEAK GRADIENT: 7 MMHG
BUN SERPL-MCNC: 7 MG/DL (ref 5–25)
CALCIUM SERPL-MCNC: 7.9 MG/DL (ref 8.3–10.1)
CHLORIDE SERPL-SCNC: 99 MMOL/L (ref 100–108)
CO2 SERPL-SCNC: 26 MMOL/L (ref 21–32)
CREAT SERPL-MCNC: 0.59 MG/DL (ref 0.6–1.3)
DOP CALC AO VTI: 26.52 CM
DOP CALC LVOT PEAK VEL VTI: 16.68 CM
DOP CALC LVOT PEAK VEL: 0.74 M/S
E WAVE DECELERATION TIME: 137 MS
ERYTHROCYTE [DISTWIDTH] IN BLOOD BY AUTOMATED COUNT: 18.6 % (ref 11.6–15.1)
FERRITIN SERPL-MCNC: 19 NG/ML (ref 8–388)
FRACTIONAL SHORTENING: 29 % (ref 28–44)
GFR SERPL CREATININE-BSD FRML MDRD: 88 ML/MIN/1.73SQ M
GLUCOSE SERPL-MCNC: 117 MG/DL (ref 65–140)
HCT VFR BLD AUTO: 28.7 % (ref 34.8–46.1)
HGB BLD-MCNC: 8.8 G/DL (ref 11.5–15.4)
INTERVENTRICULAR SEPTUM IN DIASTOLE (PARASTERNAL SHORT AXIS VIEW): 0.6 CM
IRON SATN MFR SERPL: 23 % (ref 15–50)
IRON SERPL-MCNC: 74 UG/DL (ref 50–170)
LAAS-AP2: 19.3 CM2
LAAS-AP4: 18.7 CM2
LEFT INTERNAL DIMENSION IN SYSTOLE: 3.6 CM (ref 2.1–4)
LEFT VENTRICULAR INTERNAL DIMENSION IN DIASTOLE: 5.1 CM (ref 3.69–5.49)
LEFT VENTRICULAR POSTERIOR WALL IN END DIASTOLE: 0.6 CM
LEFT VENTRICULAR STROKE VOLUME: 70 ML
MAGNESIUM SERPL-MCNC: 1.8 MG/DL (ref 1.6–2.6)
MCH RBC QN AUTO: 20.4 PG (ref 26.8–34.3)
MCHC RBC AUTO-ENTMCNC: 30.7 G/DL (ref 31.4–37.4)
MCV RBC AUTO: 66 FL (ref 82–98)
MV E'TISSUE VEL-LAT: 9 CM/S
MV E'TISSUE VEL-SEP: 6 CM/S
MV PEAK A VEL: 0.68 M/S
MV PEAK E VEL: 61 CM/S
MV STENOSIS PRESSURE HALF TIME: 0 MS
PA SYSTOLIC PRESSURE: 30 MMHG
PLATELET # BLD AUTO: 183 THOUSANDS/UL (ref 149–390)
PMV BLD AUTO: 9.5 FL (ref 8.9–12.7)
POTASSIUM SERPL-SCNC: 2.8 MMOL/L (ref 3.5–5.3)
RBC # BLD AUTO: 4.32 MILLION/UL (ref 3.81–5.12)
RIGHT ATRIAL 2D VOLUME: 30 ML
RIGHT ATRIUM AREA SYSTOLE A4C: 12.9 CM2
RIGHT VENTRICLE ID DIMENSION: 4.1 CM
SL CV LV EF: 55
SL CV PED ECHO LEFT VENTRICLE DIASTOLIC VOLUME (MOD BIPLANE) 2D: 125 ML
SL CV PED ECHO LEFT VENTRICLE SYSTOLIC VOLUME (MOD BIPLANE) 2D: 55 ML
SODIUM SERPL-SCNC: 133 MMOL/L (ref 136–145)
T4 FREE SERPL-MCNC: 1.08 NG/DL (ref 0.76–1.46)
TIBC SERPL-MCNC: 320 UG/DL (ref 250–450)
TRICUSPID VALVE PEAK REGURGITATION VELOCITY: 2.79 M/S
TRICUSPID VALVE S': 50 CM/S
TSH SERPL DL<=0.05 MIU/L-ACNC: 0.32 UIU/ML (ref 0.36–3.74)
TV PEAK GRADIENT: 31 MMHG
WBC # BLD AUTO: 5.89 THOUSAND/UL (ref 4.31–10.16)
Z-SCORE OF LEFT VENTRICULAR DIMENSION IN END SYSTOLE: 1.25

## 2021-11-09 PROCEDURE — 99232 SBSQ HOSP IP/OBS MODERATE 35: CPT | Performed by: PHYSICIAN ASSISTANT

## 2021-11-09 PROCEDURE — 80048 BASIC METABOLIC PNL TOTAL CA: CPT | Performed by: INTERNAL MEDICINE

## 2021-11-09 PROCEDURE — 93306 TTE W/DOPPLER COMPLETE: CPT

## 2021-11-09 PROCEDURE — 83540 ASSAY OF IRON: CPT | Performed by: INTERNAL MEDICINE

## 2021-11-09 PROCEDURE — 97163 PT EVAL HIGH COMPLEX 45 MIN: CPT

## 2021-11-09 PROCEDURE — 82728 ASSAY OF FERRITIN: CPT | Performed by: INTERNAL MEDICINE

## 2021-11-09 PROCEDURE — 85027 COMPLETE CBC AUTOMATED: CPT | Performed by: INTERNAL MEDICINE

## 2021-11-09 PROCEDURE — 93306 TTE W/DOPPLER COMPLETE: CPT | Performed by: INTERNAL MEDICINE

## 2021-11-09 PROCEDURE — 84443 ASSAY THYROID STIM HORMONE: CPT | Performed by: INTERNAL MEDICINE

## 2021-11-09 PROCEDURE — 97167 OT EVAL HIGH COMPLEX 60 MIN: CPT

## 2021-11-09 PROCEDURE — 83735 ASSAY OF MAGNESIUM: CPT | Performed by: INTERNAL MEDICINE

## 2021-11-09 PROCEDURE — 84439 ASSAY OF FREE THYROXINE: CPT | Performed by: INTERNAL MEDICINE

## 2021-11-09 PROCEDURE — 83550 IRON BINDING TEST: CPT | Performed by: INTERNAL MEDICINE

## 2021-11-09 RX ORDER — HYDRALAZINE HYDROCHLORIDE 20 MG/ML
5 INJECTION INTRAMUSCULAR; INTRAVENOUS ONCE
Status: COMPLETED | OUTPATIENT
Start: 2021-11-09 | End: 2021-11-09

## 2021-11-09 RX ORDER — POTASSIUM CHLORIDE 20 MEQ/1
40 TABLET, EXTENDED RELEASE ORAL 2 TIMES DAILY
Status: COMPLETED | OUTPATIENT
Start: 2021-11-09 | End: 2021-11-09

## 2021-11-09 RX ADMIN — POTASSIUM CHLORIDE 40 MEQ: 1500 TABLET, EXTENDED RELEASE ORAL at 17:28

## 2021-11-09 RX ADMIN — CARBIDOPA AND LEVODOPA 1.5 TABLET: 25; 100 TABLET ORAL at 08:06

## 2021-11-09 RX ADMIN — BRIMONIDINE TARTRATE 1 DROP: 2 SOLUTION OPHTHALMIC at 14:06

## 2021-11-09 RX ADMIN — PANTOPRAZOLE SODIUM 40 MG: 40 TABLET, DELAYED RELEASE ORAL at 05:00

## 2021-11-09 RX ADMIN — SODIUM CHLORIDE 75 ML/HR: 0.9 INJECTION, SOLUTION INTRAVENOUS at 12:41

## 2021-11-09 RX ADMIN — LATANOPROST 1 DROP: 50 SOLUTION OPHTHALMIC at 21:40

## 2021-11-09 RX ADMIN — SELEGILINE HYDROCHLORIDE 5 MG: 5 TABLET ORAL at 08:06

## 2021-11-09 RX ADMIN — SODIUM CHLORIDE 100 ML/HR: 0.9 INJECTION, SOLUTION INTRAVENOUS at 01:17

## 2021-11-09 RX ADMIN — HYDRALAZINE HYDROCHLORIDE 5 MG: 20 INJECTION INTRAMUSCULAR; INTRAVENOUS at 21:51

## 2021-11-09 RX ADMIN — ENOXAPARIN SODIUM 40 MG: 100 INJECTION SUBCUTANEOUS at 08:07

## 2021-11-09 RX ADMIN — BRIMONIDINE TARTRATE 1 DROP: 2 SOLUTION OPHTHALMIC at 05:00

## 2021-11-09 RX ADMIN — METOPROLOL TARTRATE 50 MG: 50 TABLET, FILM COATED ORAL at 21:37

## 2021-11-09 RX ADMIN — BRIMONIDINE TARTRATE 1 DROP: 2 SOLUTION OPHTHALMIC at 21:40

## 2021-11-09 RX ADMIN — AMLODIPINE BESYLATE 5 MG: 5 TABLET ORAL at 08:06

## 2021-11-09 RX ADMIN — POTASSIUM CHLORIDE 40 MEQ: 1500 TABLET, EXTENDED RELEASE ORAL at 09:04

## 2021-11-09 RX ADMIN — CARBIDOPA AND LEVODOPA 1.5 TABLET: 25; 100 TABLET ORAL at 12:28

## 2021-11-09 RX ADMIN — ATORVASTATIN CALCIUM 10 MG: 10 TABLET, FILM COATED ORAL at 17:26

## 2021-11-09 RX ADMIN — CARBIDOPA AND LEVODOPA 0.5 TABLET: 25; 100 TABLET ORAL at 21:36

## 2021-11-09 RX ADMIN — METOPROLOL TARTRATE 50 MG: 50 TABLET, FILM COATED ORAL at 08:06

## 2021-11-09 RX ADMIN — TIMOLOL MALEATE 1 DROP: 5 SOLUTION/ DROPS OPHTHALMIC at 08:07

## 2021-11-10 PROBLEM — E43 SEVERE PROTEIN-CALORIE MALNUTRITION (HCC): Status: ACTIVE | Noted: 2021-11-10

## 2021-11-10 LAB
ANION GAP SERPL CALCULATED.3IONS-SCNC: 7 MMOL/L (ref 4–13)
BUN SERPL-MCNC: 7 MG/DL (ref 5–25)
CALCIUM SERPL-MCNC: 8.8 MG/DL (ref 8.3–10.1)
CHLORIDE SERPL-SCNC: 101 MMOL/L (ref 100–108)
CO2 SERPL-SCNC: 28 MMOL/L (ref 21–32)
CREAT SERPL-MCNC: 0.71 MG/DL (ref 0.6–1.3)
ERYTHROCYTE [DISTWIDTH] IN BLOOD BY AUTOMATED COUNT: 19.6 % (ref 11.6–15.1)
GFR SERPL CREATININE-BSD FRML MDRD: 82 ML/MIN/1.73SQ M
GLUCOSE SERPL-MCNC: 104 MG/DL (ref 65–140)
HCT VFR BLD AUTO: 32.9 % (ref 34.8–46.1)
HGB BLD-MCNC: 10.1 G/DL (ref 11.5–15.4)
MCH RBC QN AUTO: 20.4 PG (ref 26.8–34.3)
MCHC RBC AUTO-ENTMCNC: 30.7 G/DL (ref 31.4–37.4)
MCV RBC AUTO: 67 FL (ref 82–98)
PLATELET # BLD AUTO: 212 THOUSANDS/UL (ref 149–390)
PMV BLD AUTO: 10 FL (ref 8.9–12.7)
POTASSIUM SERPL-SCNC: 3.7 MMOL/L (ref 3.5–5.3)
RBC # BLD AUTO: 4.94 MILLION/UL (ref 3.81–5.12)
SODIUM SERPL-SCNC: 136 MMOL/L (ref 136–145)
WBC # BLD AUTO: 5 THOUSAND/UL (ref 4.31–10.16)

## 2021-11-10 PROCEDURE — 99232 SBSQ HOSP IP/OBS MODERATE 35: CPT | Performed by: PHYSICIAN ASSISTANT

## 2021-11-10 PROCEDURE — 80048 BASIC METABOLIC PNL TOTAL CA: CPT | Performed by: INTERNAL MEDICINE

## 2021-11-10 PROCEDURE — 85027 COMPLETE CBC AUTOMATED: CPT | Performed by: INTERNAL MEDICINE

## 2021-11-10 RX ADMIN — SODIUM CHLORIDE 75 ML/HR: 0.9 INJECTION, SOLUTION INTRAVENOUS at 03:30

## 2021-11-10 RX ADMIN — BRIMONIDINE TARTRATE 1 DROP: 2 SOLUTION OPHTHALMIC at 05:52

## 2021-11-10 RX ADMIN — BRIMONIDINE TARTRATE 1 DROP: 2 SOLUTION OPHTHALMIC at 13:52

## 2021-11-10 RX ADMIN — ATORVASTATIN CALCIUM 10 MG: 10 TABLET, FILM COATED ORAL at 17:17

## 2021-11-10 RX ADMIN — CARBIDOPA AND LEVODOPA 1 TABLET: 25; 100 TABLET ORAL at 08:20

## 2021-11-10 RX ADMIN — LATANOPROST 1 DROP: 50 SOLUTION OPHTHALMIC at 20:49

## 2021-11-10 RX ADMIN — TIMOLOL MALEATE 1 DROP: 5 SOLUTION/ DROPS OPHTHALMIC at 08:18

## 2021-11-10 RX ADMIN — AMLODIPINE BESYLATE 5 MG: 5 TABLET ORAL at 08:19

## 2021-11-10 RX ADMIN — SELEGILINE HYDROCHLORIDE 5 MG: 5 TABLET ORAL at 08:19

## 2021-11-10 RX ADMIN — BRIMONIDINE TARTRATE 1 DROP: 2 SOLUTION OPHTHALMIC at 20:49

## 2021-11-10 RX ADMIN — METOPROLOL TARTRATE 50 MG: 50 TABLET, FILM COATED ORAL at 20:49

## 2021-11-10 RX ADMIN — ENOXAPARIN SODIUM 40 MG: 100 INJECTION SUBCUTANEOUS at 08:21

## 2021-11-10 RX ADMIN — PANTOPRAZOLE SODIUM 40 MG: 40 TABLET, DELAYED RELEASE ORAL at 06:16

## 2021-11-10 RX ADMIN — ALPRAZOLAM 0.25 MG: 0.25 TABLET ORAL at 00:05

## 2021-11-10 RX ADMIN — CARBIDOPA AND LEVODOPA 0.5 TABLET: 25; 100 TABLET ORAL at 20:50

## 2021-11-10 RX ADMIN — METOPROLOL TARTRATE 50 MG: 50 TABLET, FILM COATED ORAL at 08:19

## 2021-11-11 ENCOUNTER — TRANSITIONAL CARE MANAGEMENT (OUTPATIENT)
Dept: FAMILY MEDICINE CLINIC | Facility: CLINIC | Age: 76
End: 2021-11-11

## 2021-11-11 VITALS
SYSTOLIC BLOOD PRESSURE: 166 MMHG | DIASTOLIC BLOOD PRESSURE: 92 MMHG | WEIGHT: 119 LBS | BODY MASS INDEX: 23.99 KG/M2 | OXYGEN SATURATION: 95 % | TEMPERATURE: 97.6 F | RESPIRATION RATE: 17 BRPM | HEIGHT: 59 IN | HEART RATE: 81 BPM

## 2021-11-11 LAB
FLUAV RNA RESP QL NAA+PROBE: NEGATIVE
FLUBV RNA RESP QL NAA+PROBE: NEGATIVE
RSV RNA RESP QL NAA+PROBE: NEGATIVE
SARS-COV-2 RNA RESP QL NAA+PROBE: NEGATIVE

## 2021-11-11 PROCEDURE — 97116 GAIT TRAINING THERAPY: CPT

## 2021-11-11 PROCEDURE — 97530 THERAPEUTIC ACTIVITIES: CPT

## 2021-11-11 PROCEDURE — 0241U HB NFCT DS VIR RESP RNA 4 TRGT: CPT | Performed by: PHYSICIAN ASSISTANT

## 2021-11-11 PROCEDURE — 99239 HOSP IP/OBS DSCHRG MGMT >30: CPT | Performed by: PHYSICIAN ASSISTANT

## 2021-11-11 RX ADMIN — CARBIDOPA AND LEVODOPA 0.5 TABLET: 25; 100 TABLET ORAL at 08:23

## 2021-11-11 RX ADMIN — CARBIDOPA AND LEVODOPA 0.5 TABLET: 25; 100 TABLET ORAL at 14:37

## 2021-11-11 RX ADMIN — ENOXAPARIN SODIUM 40 MG: 100 INJECTION SUBCUTANEOUS at 08:24

## 2021-11-11 RX ADMIN — AMLODIPINE BESYLATE 5 MG: 5 TABLET ORAL at 08:23

## 2021-11-11 RX ADMIN — BRIMONIDINE TARTRATE 1 DROP: 2 SOLUTION OPHTHALMIC at 14:39

## 2021-11-11 RX ADMIN — TIMOLOL MALEATE 1 DROP: 5 SOLUTION/ DROPS OPHTHALMIC at 08:23

## 2021-11-11 RX ADMIN — PANTOPRAZOLE SODIUM 40 MG: 40 TABLET, DELAYED RELEASE ORAL at 05:03

## 2021-11-11 RX ADMIN — SELEGILINE HYDROCHLORIDE 5 MG: 5 TABLET ORAL at 07:23

## 2021-11-11 RX ADMIN — METOPROLOL TARTRATE 50 MG: 50 TABLET, FILM COATED ORAL at 08:23

## 2021-11-11 RX ADMIN — BRIMONIDINE TARTRATE 1 DROP: 2 SOLUTION OPHTHALMIC at 05:03

## 2021-11-12 LAB
ATRIAL RATE: 277 BPM
ATRIAL RATE: 75 BPM
P AXIS: 45 DEGREES
P AXIS: 75 DEGREES
PR INTERVAL: 212 MS
QRS AXIS: -21 DEGREES
QRS AXIS: -35 DEGREES
QRSD INTERVAL: 78 MS
QRSD INTERVAL: 86 MS
QT INTERVAL: 332 MS
QT INTERVAL: 418 MS
QTC INTERVAL: 415 MS
QTC INTERVAL: 466 MS
T WAVE AXIS: 64 DEGREES
T WAVE AXIS: 90 DEGREES
VENTRICULAR RATE: 75 BPM
VENTRICULAR RATE: 94 BPM

## 2021-11-12 PROCEDURE — 93010 ELECTROCARDIOGRAM REPORT: CPT | Performed by: INTERNAL MEDICINE

## 2021-11-18 ENCOUNTER — TELEPHONE (OUTPATIENT)
Dept: NEUROLOGY | Facility: CLINIC | Age: 76
End: 2021-11-18

## 2022-01-28 ENCOUNTER — APPOINTMENT (OUTPATIENT)
Dept: RADIOLOGY | Facility: CLINIC | Age: 77
End: 2022-01-28
Payer: MEDICARE

## 2022-01-28 ENCOUNTER — OFFICE VISIT (OUTPATIENT)
Dept: OBGYN CLINIC | Facility: CLINIC | Age: 77
End: 2022-01-28
Payer: MEDICARE

## 2022-01-28 VITALS — WEIGHT: 119 LBS | HEIGHT: 59 IN | BODY MASS INDEX: 23.99 KG/M2

## 2022-01-28 DIAGNOSIS — S72.002A CLOSED DISPLACED FRACTURE OF LEFT FEMORAL NECK (HCC): Primary | ICD-10-CM

## 2022-01-28 DIAGNOSIS — S72.002A CLOSED DISPLACED FRACTURE OF LEFT FEMORAL NECK (HCC): ICD-10-CM

## 2022-01-28 PROCEDURE — 99213 OFFICE O/P EST LOW 20 MIN: CPT | Performed by: ORTHOPAEDIC SURGERY

## 2022-01-28 PROCEDURE — 73502 X-RAY EXAM HIP UNI 2-3 VIEWS: CPT

## 2022-01-28 NOTE — PROGRESS NOTES
JIM Reed  Attending, Orthopaedic Surgery  Foot and [address] [facility] [address] [facility]      ORTHOPAEDIC FOOT AND ANKLE CLINIC VISIT     Assessment:     Encounter Diagnosis   Name Primary?  Closed displaced fracture of left femoral neck (HCC) Yes       Left hip hemiarthroplasty performed on 7/11/21     Plan:   · The patient verbalized understanding of exam findings and treatment plan  We engaged in the shared decision-making process and treatment options were discussed at length with the patient  Surgical and conservative management discussed today along with risks and benefits  · Socrates Agrawal is now 6 5 months out from her surgery on 7/11/21 and is progressing well  X-rays obtained today demonstrates stable prothesis  · She is cleared to return to all normal activities  · No x-rays needed at next visit  Return if symptoms worsen or fail to improve  History of Present Illness:   Chief Complaint:   Chief Complaint   Patient presents with    Left Hip - Follow-up     Cynthia Tanner is a 64 y o  female who is being seen in follow-up for left hip hemiarthroplasty performed on 7/11/21  When we last saw she we recommended updating a DEXA scan through her PCP due to having osteoporosis  Pain has  improved  No residual pain  Pain/symptom timing:  Worse during the day when active  Pain/symptom context:  Worse with activites and work  Pain/symptom modifying factors:  Rest makes better, activities make worse  Pain/symptom associated signs/symptoms: none    Prior treatment   · NSAIDsYes   · Injections No   · Bracing/Orthotics No    · Physical Therapy Yes     Orthopedic Surgical History:   See Below    Past Medical, Surgical and Social History:  Past Medical History:  has a past medical history of Constipation, Hyperlipidemia, Hypertension, and Parkinson disease ([facility] [address] )  Problem List: does not have any pertinent problems on file    Past Surgical History:  has a past surgical history that includes Cholecystectomy;  section; and pr partial hip replacement (Left, 2021)  Family History: family history is not on file  Social History:  reports that she quit smoking about 10 years ago  Her smoking use included cigarettes  She started smoking about 57 years ago  She smoked 0 25 packs per day  She has never used smokeless tobacco  She reports current alcohol use  She reports that she does not use drugs  Current Medications: has a current medication list which includes the following prescription(s): acetaminophen, alphagan p, alprazolam, amlodipine, aspirin, atorvastatin, carbidopa-levodopa, docusate sodium, esomeprazole, latanoprost, metoprolol tartrate, rasagiline, timolol, enoxaparin, polyethylene glycol, senna, and [DISCONTINUED] donepezil  Allergies: has No Known Allergies  Review of Systems:  General- denies fever/chills  HEENT- denies hearing loss or sore throat  Eyes- denies eye pain or visual disturbances, denies red eyes  Respiratory- denies cough or SOB  Cardio- denies chest pain or palpitations  GI- denies abdominal pain  Endocrine- denies urinary frequency  Urinary- denies pain with urination  Musculoskeletal- Negative except noted above  Skin- denies rashes or wounds  Neurological- denies dizziness or headache  Psychiatric- denies anxiety or difficulty concentrating    Physical Exam:   Ht 4' 11" (1 499 m)   Wt 54 kg (119 lb)   LMP  (LMP Unknown)   BMI 24 04 kg/m²   General/Constitutional: No apparent distress: well-nourished and well developed  Eyes: normal ocular motion  Lymphatic: No appreciable lymphadenopathy  Respiratory: Non-labored breathing  Vascular: No edema, swelling or tenderness, except as noted in detailed exam   Integumentary: No impressive skin lesions present, except as noted in detailed exam   Neuro: No ataxia or tremors noted  Psych: Normal mood and affect, oriented to person, place and time  Appropriate affect    Musculoskeletal: Normal, except as noted in detailed exam and in HPI  Examination    Left    Gait Normal   Musculoskeletal No TTP    Skin Normal   Well-healed incisions  Nails Normal         Stability Stable    Muscle Strength 5/5 tibialis anterior  5/5 gastrocnemius-soleus  5/5 posterior tibialis  5/5 peroneal/eversion strength  5/5 EHL  5/5 FHL    Neurologic Normal    Sensation  Intact to light touch throughout sural, saphenous, superficial peroneal, deep peroneal and medial/lateral plantar nerve distributions  [place]-Jigna 5 07 filament (10g) testing  deferred  Cardiovascular Brisk capillary refill < 2 seconds,intact DP and PT pulses    Special Tests None      Imaging Studies:     3 views of the Left hip were obtained, reviewed and interpreted independently which demonstrate stable and intact prothesis without evidence of loosening or displacement  Reviewed by me personally  Noelia Clack Lachman, MD  Foot & Ankle Surgery   Department of [address] [place] [address]      I personally performed the service  Noelia Clack Lachman, MD    Scribe Attestation    I,:  Hollice Denver, MA am acting as a scribe while in the presence of the attending physician :       I,:  Chris Trivedi MD personally performed the services described in this documentation    as scribed in my presence :

## 2022-02-28 DIAGNOSIS — G20 PARKINSON DISEASE (HCC): ICD-10-CM

## 2022-02-28 NOTE — TELEPHONE ENCOUNTER
Pt's  called requesting rasagiline refill be sent to [address] [place] [address] on file  rx entered   Pls review and sign off      thanks

## 2022-03-01 RX ORDER — RASAGILINE 1 MG/1
1 TABLET ORAL DAILY
Qty: 90 TABLET | Refills: 6 | Status: SHIPPED | OUTPATIENT
Start: 2022-03-01

## 2022-03-09 DIAGNOSIS — E78.5 HYPERLIPIDEMIA, UNSPECIFIED HYPERLIPIDEMIA TYPE: ICD-10-CM

## 2022-03-09 DIAGNOSIS — I10 ESSENTIAL HYPERTENSION: ICD-10-CM

## 2022-03-09 RX ORDER — AMLODIPINE BESYLATE 5 MG/1
5 TABLET ORAL DAILY
Qty: 90 TABLET | Refills: 1 | Status: SHIPPED | OUTPATIENT
Start: 2022-03-09

## 2022-03-09 RX ORDER — ATORVASTATIN CALCIUM 10 MG/1
TABLET, FILM COATED ORAL
Qty: 90 TABLET | Refills: 1 | Status: SHIPPED | OUTPATIENT
Start: 2022-03-09

## 2022-04-05 ENCOUNTER — TELEPHONE (OUTPATIENT)
Dept: OTHER | Facility: OTHER | Age: 77
End: 2022-04-05

## 2022-04-05 DIAGNOSIS — I10 HYPERTENSION, ESSENTIAL: ICD-10-CM

## 2022-04-05 RX ORDER — METOPROLOL TARTRATE 50 MG/1
50 TABLET, FILM COATED ORAL DAILY
Qty: 90 TABLET | Refills: 1 | Status: SHIPPED | OUTPATIENT
Start: 2022-04-05

## 2022-04-05 RX ORDER — METOPROLOL TARTRATE 50 MG/1
50 TABLET, FILM COATED ORAL DAILY
Status: CANCELLED | OUTPATIENT
Start: 2022-04-05

## 2022-04-05 NOTE — TELEPHONE ENCOUNTER
Metoprolol Tartrate wasn't refilled by HF  Patient is required to have a follow up appointment  Triage RN wasn't able to reach out to the patient

## 2022-04-14 ENCOUNTER — OFFICE VISIT (OUTPATIENT)
Dept: NEUROLOGY | Facility: CLINIC | Age: 77
End: 2022-04-14
Payer: MEDICARE

## 2022-04-14 VITALS — SYSTOLIC BLOOD PRESSURE: 112 MMHG | DIASTOLIC BLOOD PRESSURE: 56 MMHG | HEART RATE: 62 BPM

## 2022-04-14 DIAGNOSIS — G20 PARKINSON DISEASE (HCC): ICD-10-CM

## 2022-04-14 PROCEDURE — 99214 OFFICE O/P EST MOD 30 MIN: CPT | Performed by: PHYSICIAN ASSISTANT

## 2022-04-14 NOTE — PATIENT INSTRUCTIONS
Will try and make a very SMALL increase of Sinemet to see if it is tolerated    Will take Simemet 1/2 tab 9am, 1pm, 5pm, 9pm   No changes to her Azilect     She was encouraged to remain at this time

## 2022-04-14 NOTE — ASSESSMENT & PLAN NOTE
Patient with Parkinson's disease complicated by side effects on even low-dose Sinemet  She has tried to increase her Sinemet dose to 1 tab 3 times a day on multiple occasions unfortunately she has had side effects each time  She does continue to have improvement both tremors and gait since being back on the low-dose Sinemet however on exam she does have moderate right sided bradykinesia and rigidity  She is currently tolerating her dose of half a tab 3 times a day without any negative side effects and we discussed once again the option of very slowly trying to increase this medication  Given side effects when taking 1 tab at a time we decided to instead increase to half a tab 4 times a day  She will start by taking Sinemet half a tab at 9:00 a m , 1:00 p m , 5:00 p m  and 9:00 p m  Catherine Doshi She will call with any side effects with this dosing  If tolerated we could consider further increases in the future  1 of the biggest side effect she has had with increased doses in the past has been dizziness  She was not orthostatic on exam today and per her inpatient notes she was not orthostatic when in the hospital recently for syncope  We could however consider to monitor this if we were to increase the dose and possibly start a trial of midodrine or Florinef if this is the case  For now she will also continue to take Azilect which she has tolerated from the start without any issues  She was encouraged to remain active  Per the  she was at her best when she was getting physical therapy    Because of this I will make another referral for formal PT

## 2022-04-14 NOTE — PROGRESS NOTES
Patient ID: Eddie Lovett is a 64 y o  female  Assessment/Plan:    Parkinson disease [facility])  Patient with Parkinson's disease complicated by side effects on even low-dose Sinemet  She has tried to increase her Sinemet dose to 1 tab 3 times a day on multiple occasions unfortunately she has had side effects each time  She does continue to have improvement both tremors and gait since being back on the low-dose Sinemet however on exam she does have moderate right sided bradykinesia and rigidity  She is currently tolerating her dose of half a tab 3 times a day without any negative side effects and we discussed once again the option of very slowly trying to increase this medication  Given side effects when taking 1 tab at a time we decided to instead increase to half a tab 4 times a day  She will start by taking Sinemet half a tab at 9:00 a m , 1:00 p m , 5:00 p m  and 9:00 p m  Pavel Berg She will call with any side effects with this dosing  If tolerated we could consider further increases in the future  1 of the biggest side effect she has had with increased doses in the past has been dizziness  She was not orthostatic on exam today and per her inpatient notes she was not orthostatic when in the hospital recently for syncope  We could however consider to monitor this if we were to increase the dose and possibly start a trial of midodrine or Florinef if this is the case  For now she will also continue to take Azilect which she has tolerated from the start without any issues  She was encouraged to remain active  Per the  she was at her best when she was getting physical therapy  Because of this I will make another referral for formal PT  Subjective:    Ms Anil Peraza is a right handed woman who presents for Movement follow up for Parkinson's disease  She was previous followed by Dr Chen Suárez  Review of his notes and chart reveal symptoms began with right arm tremor in 2016   Sharon Carreno seen on Azilect with asymmetric rigidity R>L, rest tremor bradykinesia with decrement  Last seen September 2020 with her daughter having raised some concerns regarding her cognition and her driving ability  Ms Cherri Amos was to hold off on driving until completing a fitness to drive evaluation  Aricept was started  She called stating Aricept caused increase tremor and she discontinued it  She is no longer driving  In the past she did stop Sinemet due to potential side effects however her PD symptoms worsened and it was restarted  At her last visit she had improvement of tremors and gait with restarting the Sinemet  However she was still having bradykinesia and rigidity on exam   Because of this her Sinemet dose was slowly increased further  INTERVAL HISTORY:  She unfortunately once again had side effects with higher dosing of Sinemet  She was in the hospital for syncopal episode 11-21 and at that time it was felt to likely be volume depletion as well as perhaps side effect from the Sinemet  She was NOT orthostatic during that hospitalization  Her Sinemet dose however was reduced further with improvement of symptoms      She is tolerating the 1/2 tab of Sinemet at this time   She no longer feels dizzy when standing, per the  this will happen once in a while however it is not often right now   She feels that the tremors are overall well controlled, will have some occasional tremors in the hands   She feels that she is walking well, no falls   She can dress on her own however her  will help if needed, daughters will help with showers   No issues with swallowing   She sleeps well at night   No hallucinations   She likes to go for walks and does some leg exercises   She had PT in the past which was very helpful for her, per the  she was doing much better when in therapy  She will have some episodes of confusion at times, for the most part however the  feels that she is doing well      Current medications:  Sinemet 1/2 tab tid (9am, 2pm, 9pm) - higher doses have caused side effects on multiple attempts   Azilect 1mg daily         I personally reviewed and updated the ROS  Objective:    Blood pressure 112/56, pulse 62, not currently breastfeeding  Physical Exam  HENT:      Right Ear: Hearing normal       Left Ear: Hearing normal    Eyes:      General: Lids are normal       Extraocular Movements: Extraocular movements intact  Pupils: Pupils are equal, round, and reactive to light  Pulmonary:      Effort: Pulmonary effort is normal    Neurological:      Mental Status: She is alert  Deep Tendon Reflexes: Strength normal          Neurological Exam  Mental Status  Alert  Oriented only to person  Speech: hypophonia  Language is fluent with no aphasia  MOCA 15/30 - 1/13/21  Cranial Nerves  CN III, IV, VI: Extraocular movements intact bilaterally  Normal lids and orbits bilaterally  Pupils equal round and reactive to light bilaterally  CN V:  Right: Facial sensation is normal   Left: Facial sensation is normal on the left  CN VIII:  Right: Hearing is normal   Left: Hearing is normal   CN XI: Shoulder shrug strength is normal     Motor  Normal muscle bulk throughout  Increased muscle tone  Strength is 5/5 throughout all four extremities  Sensory  Light touch is normal in upper and lower extremities  Reflexes  Glabellar tap present  Coordination  Right: Finger-to-nose normal  Rapid alternating movement abnormality:  Left: Finger-to-nose normal  Rapid alternating movement abnormality:  See MDS UPDRS III  Gait  Casual gait: Unable to rise from chair without using arms  Decreased stride and arm swing  Stooped posture  Slow to stand  Cliff Silvana     UPDRS motor:                              Time since last dose:   4/14/22 11/4/21   Speech  1  1   Facial Expression        Rigidity - Neck  1     Rigidity - Upper Extremity (Right)  2  2   Rigidity - Upper Extremity (Left)   2  1   Rigidity - Lower Extremity (Right)  1  1   Rigidity - Lower Extremity (Left)   0  0   Finger Taps (Right)   3  2   Finger Taps (Left)   2  2   Hand Movement (Right)  2  2   Hand Movement (Left)   1  1   Pronation/Supination (Right)  2  2   Pronation/Supination (Left)   2  1   Toe Tapping (Right) 3  2   Toe Tapping (Left) 2  2   Leg Agility (Right)  2  2   Leg Agility (Left)   2  2   Arising from Chair   2  1   Gait   2  2   Freezing of Gait 0  0   Postural Stability         Posture 2  2   Global spontaneity of movement 2  2   Postural Tremor (Right) 0  0   Postural Tremor (Left) 0  0   Kinetic Tremor (Right)  0  0   Kinetic Tremor (Left)  0  0   Rest tremor amplitude RUE 2  1   Rest tremor amplitude LUE 0  0   Rest tremor amplitude RLE 0  0   Reset tremor amplitude LLE 0  0   Lip/Jaw Tremor  1  0   Consistency of tremor 1  1   Motor Exam Total:              ROS:    Review of Systems   Constitutional: Negative  Negative for appetite change and fever  HENT: Negative  Negative for hearing loss, tinnitus, trouble swallowing and voice change  Eyes: Negative  Negative for photophobia and pain  Respiratory: Negative  Negative for shortness of breath  Cardiovascular: Negative  Negative for palpitations  Gastrointestinal: Negative  Negative for nausea and vomiting  Endocrine: Negative  Negative for cold intolerance  Genitourinary: Negative  Negative for dysuria, frequency and urgency  Musculoskeletal: Negative for myalgias and neck pain  Balance issues once in a while     Skin: Negative  Negative for rash  Allergic/Immunologic: Negative  Neurological: Positive for dizziness (Occasional) and tremors (Hands, has gotten better)  Negative for seizures, syncope, facial asymmetry, speech difficulty, weakness, light-headedness, numbness and headaches  Hematological: Negative  Does not bruise/bleed easily  Psychiatric/Behavioral: Positive for confusion (Once in a while)   Negative for hallucinations and sleep disturbance  All other systems reviewed and are negative

## 2022-05-12 ENCOUNTER — OFFICE VISIT (OUTPATIENT)
Dept: FAMILY MEDICINE CLINIC | Facility: CLINIC | Age: 77
End: 2022-05-12
Payer: MEDICARE

## 2022-05-12 ENCOUNTER — LAB (OUTPATIENT)
Dept: LAB | Facility: CLINIC | Age: 77
End: 2022-05-12
Payer: MEDICARE

## 2022-05-12 VITALS
HEART RATE: 66 BPM | BODY MASS INDEX: 23.47 KG/M2 | WEIGHT: 116.4 LBS | DIASTOLIC BLOOD PRESSURE: 84 MMHG | HEIGHT: 59 IN | OXYGEN SATURATION: 95 % | SYSTOLIC BLOOD PRESSURE: 118 MMHG | RESPIRATION RATE: 15 BRPM | TEMPERATURE: 99.1 F

## 2022-05-12 DIAGNOSIS — R35.0 URINARY FREQUENCY: Primary | ICD-10-CM

## 2022-05-12 DIAGNOSIS — R35.0 URINARY FREQUENCY: ICD-10-CM

## 2022-05-12 LAB
BILIRUB UR QL STRIP: NEGATIVE
CLARITY UR: ABNORMAL
COLOR UR: YELLOW
GLUCOSE UR STRIP-MCNC: NEGATIVE MG/DL
HGB UR QL STRIP.AUTO: NEGATIVE
KETONES UR STRIP-MCNC: NEGATIVE MG/DL
LEUKOCYTE ESTERASE UR QL STRIP: ABNORMAL
NITRITE UR QL STRIP: NEGATIVE
PH UR STRIP.AUTO: 5.5 [PH]
PROT UR STRIP-MCNC: ABNORMAL MG/DL
SP GR UR STRIP.AUTO: 1.02 (ref 1–1.03)
UROBILINOGEN UR STRIP-ACNC: 2 MG/DL

## 2022-05-12 PROCEDURE — 81001 URINALYSIS AUTO W/SCOPE: CPT

## 2022-05-12 PROCEDURE — 99214 OFFICE O/P EST MOD 30 MIN: CPT | Performed by: FAMILY MEDICINE

## 2022-05-12 PROCEDURE — 87086 URINE CULTURE/COLONY COUNT: CPT

## 2022-05-12 NOTE — PROGRESS NOTES
Eddie Lovett 1945 female MRN: [id number]    Family Medicine Acute Visit    ASSESSMENT/PLAN  Problem List Items Addressed This Visit    None     Visit Diagnoses     Urinary frequency    -  Primary    Relevant Orders    UA (URINE) with reflex to Scope    Urine culture          Todd Mckay was unable to provide a urine sample in the office today  Gave supplies and order to collect at home and drop off sample when she is able and we will prescribe medication as needed once results are complete  Future Appointments   Date Time Provider Misty Michelle   2022 11:30 AM Aparna Caba MD NEURO [facility]          SUBJECTIVE  CC: Urinary Frequency      HPI:  Eddie Lovett is a 64 y o  female who presents for urinary frequency for over a week  No dysuria  No fever  No changes to diet, eating and drinking well  Review of Systems   Constitutional: Negative for chills, fatigue and fever  HENT: Negative for congestion, postnasal drip, rhinorrhea and sinus pressure  Eyes: Negative for photophobia and visual disturbance  Respiratory: Negative for cough and shortness of breath  Cardiovascular: Negative for chest pain, palpitations and leg swelling  Gastrointestinal: Negative for abdominal pain, constipation, diarrhea, nausea and vomiting  Genitourinary: Positive for frequency  Negative for difficulty urinating and dysuria  Musculoskeletal: Negative for arthralgias and myalgias  Skin: Negative for color change and rash  Neurological: Negative for dizziness, weakness, light-headedness and headaches         Historical Information   The patient history was reviewed as follows:  Past Medical History:   Diagnosis Date    Constipation     Hyperlipidemia     Hypertension     Parkinson disease ([facility] [address] )          Past Surgical History:   Procedure Laterality Date     SECTION      CHOLECYSTECTOMY      RI PARTIAL HIP REPLACEMENT Left 2021    Procedure: HEMIARTHROPLASTY HIP (BIPOLAR);   Surgeon: Annita Herring MD;  Location:  MAIN OR;  Service: Orthopedics     Family History   Problem Relation Age of Onset    Tremor Neg Hx     Parkinsonism Neg Hx       Social History   Social History     Substance and Sexual Activity   Alcohol Use Yes    Comment: Socially     Social History     Substance and Sexual Activity   Drug Use No     Social History     Tobacco Use   Smoking Status Former Smoker    Packs/day: 0 25    Types: Cigarettes    Start date: 63    Quit date: 7/29/2011    Years since quitting: 10 7   Smokeless Tobacco Never Used       Medications:     Current Outpatient Medications:     acetaminophen (TYLENOL) 325 mg tablet, Take 3 tablets (975 mg total) by mouth every 8 (eight) hours, Disp: 30 tablet, Rfl: 0    Alphagan P 0 1 %, INSTILL 1 DROP INTO EACH EYE TWICE DAILY, Disp: , Rfl:     ALPRAZolam (XANAX) 0 25 mg tablet, Take 1 tablet by mouth twice daily as needed for anxiety, Disp: 60 tablet, Rfl: 0    amLODIPine (NORVASC) 5 mg tablet, Take 1 tablet (5 mg total) by mouth daily, Disp: 90 tablet, Rfl: 1    aspirin (ECOTRIN) 325 mg EC tablet, Take 325 mg by mouth every 6 (six) hours as needed for mild pain, Disp: , Rfl:     atorvastatin (LIPITOR) 10 mg tablet, Take one tab at night, Disp: 90 tablet, Rfl: 1    carbidopa-levodopa (Sinemet)  mg per tablet, Take 0 5 tablets by mouth 4 (four) times a day, Disp: 60 tablet, Rfl: 3    docusate sodium (COLACE) 100 mg capsule, Take 1 capsule (100 mg total) by mouth 2 (two) times a day, Disp: 10 capsule, Rfl: 0    esomeprazole (NexIUM) 20 mg capsule, Take 20 mg by mouth every morning before breakfast, Disp: , Rfl:     latanoprost (XALATAN) 0 005 % ophthalmic solution, 1 drop daily at bedtime, Disp: , Rfl:     metoprolol tartrate (LOPRESSOR) 50 mg tablet, Take 1 tablet (50 mg total) by mouth daily, Disp: 90 tablet, Rfl: 1    rasagiline (AZILECT) 1 MG, Take 1 tablet (1 mg total) by mouth daily, Disp: 90 tablet, Rfl: 6   timolol (BETIMOL) 0 5 % ophthalmic solution, Administer 1 drop to both eyes daily , Disp: , Rfl:     enoxaparin (LOVENOX) 30 mg/0 3 mL, Inject 0 3 mL (30 mg total) under the skin daily, Disp: 9 mL, Rfl: 0    polyethylene glycol (MIRALAX) 17 g packet, Take 17 g by mouth daily as needed (constipation) for up to 14 days, Disp: 238 g, Rfl: 0    senna (SENOKOT) 8 6 mg, Take 1 tablet (8 6 mg total) by mouth daily at bedtime for 14 days, Disp: 14 tablet, Rfl: 0    No Known Allergies    OBJECTIVE  Vitals:   Vitals:    05/12/22 0927   BP: 118/84   BP Location: Left arm   Patient Position: Sitting   Cuff Size: Standard   Pulse: 66   Resp: 15   Temp: 99 1 °F (37 3 °C)   TempSrc: Tympanic   SpO2: 95%   Weight: 52 8 kg (116 lb 6 4 oz)   Height: 4' 11" (1 499 m)         Physical Exam  Constitutional:       Appearance: She is well-developed  HENT:      Head: Normocephalic and atraumatic  Cardiovascular:      Rate and Rhythm: Normal rate and regular rhythm  Heart sounds: Normal heart sounds  Pulmonary:      Effort: Pulmonary effort is normal  No respiratory distress  Breath sounds: Normal breath sounds  No wheezing  Abdominal:      General: Bowel sounds are normal  There is no distension  Palpations: Abdomen is soft  Tenderness: There is no abdominal tenderness  Musculoskeletal:         General: No tenderness  Normal range of motion  Cervical back: Normal range of motion and neck supple  Skin:     General: Skin is warm and dry  Neurological:      Mental Status: She is alert                      [place], DO    5/12/2022

## 2022-05-13 DIAGNOSIS — N30.00 ACUTE CYSTITIS WITHOUT HEMATURIA: Primary | ICD-10-CM

## 2022-05-13 LAB
BACTERIA UR QL AUTO: ABNORMAL /HPF
CAOX CRY URNS QL MICRO: ABNORMAL /HPF
HYALINE CASTS #/AREA URNS LPF: ABNORMAL /LPF
MUCOUS THREADS UR QL AUTO: ABNORMAL
NON-SQ EPI CELLS URNS QL MICRO: ABNORMAL /HPF
RBC #/AREA URNS AUTO: ABNORMAL /HPF
WBC #/AREA URNS AUTO: ABNORMAL /HPF

## 2022-05-13 RX ORDER — NITROFURANTOIN 25; 75 MG/1; MG/1
100 CAPSULE ORAL 2 TIMES DAILY
Qty: 10 CAPSULE | Refills: 0 | Status: SHIPPED | OUTPATIENT
Start: 2022-05-13 | End: 2022-05-18

## 2022-05-13 NOTE — RESULT ENCOUNTER NOTE
Spoke with the patient's  and notified him of Sonam's test results  He was made aware of the prescription and has no questions or concerns to be addressed at this time  We'll call back if the prescription needs to be changed

## 2022-05-14 LAB — BACTERIA UR CULT: NORMAL

## 2022-07-06 ENCOUNTER — OFFICE VISIT (OUTPATIENT)
Dept: NEUROLOGY | Facility: CLINIC | Age: 77
End: 2022-07-06
Payer: MEDICARE

## 2022-07-06 VITALS — SYSTOLIC BLOOD PRESSURE: 110 MMHG | HEART RATE: 75 BPM | DIASTOLIC BLOOD PRESSURE: 60 MMHG

## 2022-07-06 DIAGNOSIS — G20 COGNITIVE DEFICIT DUE TO PARKINSON'S DISEASE (HCC): Primary | ICD-10-CM

## 2022-07-06 DIAGNOSIS — G20 PARKINSON DISEASE (HCC): ICD-10-CM

## 2022-07-06 PROCEDURE — 99214 OFFICE O/P EST MOD 30 MIN: CPT | Performed by: PSYCHIATRY & NEUROLOGY

## 2022-07-06 NOTE — PATIENT INSTRUCTIONS
Will try increasing Sinemet 25/100 to 1 tab q9am, 1/2 tab at 1pm, 1 tab at 5pm and 1/2 tab at 9pm  If tolerating this dose after 4 weeks can further increase to 1 tab 4 times daily

## 2022-07-06 NOTE — ASSESSMENT & PLAN NOTE
Progression with increased bradykinesia, rigidity and gait difficulty with postural instability  She would benefit from further increase in dopaminergic supplementation  She has previously had difficulty tolerating increases in medication due to "dizziness" which she is unable to describe  She denies lightheadedness or vertiginous symptoms  We will retry increasing Sinemet  Instructed to  Sinemet 25/100 to 1 tab q9am, 1/2 tab at 1pm, 1 tab at 5pm and 1/2 tab at 9pm  If tolerating this dose after 4 weeks can further increase to 1 tab 4 times daily    She was encouraged to use her walker  She will continue with physical therapy

## 2022-07-06 NOTE — PROGRESS NOTES
Patient ID: Obed Shore is a 76 y o  female  Assessment/Plan:    Parkinson disease ([facility] [address] )  Progression with increased bradykinesia, rigidity and gait difficulty with postural instability  She would benefit from further increase in dopaminergic supplementation  She has previously had difficulty tolerating increases in medication due to "dizziness" which she is unable to describe  She denies lightheadedness or vertiginous symptoms  We will retry increasing Sinemet  Instructed to  Sinemet 25/100 to 1 tab q9am, 1/2 tab at 1pm, 1 tab at 5pm and 1/2 tab at 9pm  If tolerating this dose after 4 weeks can further increase to 1 tab 4 times daily    She was encouraged to use her walker  She will continue with physical therapy  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 [facility])  PDD with progression  Previously unable to tolerate donepezil  Time spent discussing dementia in PD and its progression  Side effects to medication outweighs benefit at this point so we will focus on treating motor symptoms of PD  Diagnoses and all orders for this visit:    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 ([facility] [address] )    Parkinson disease ([facility] [address] )  -     carbidopa-levodopa (Sinemet)  mg per tablet; 1 tab at 9am, and 5pm and 1/2 tab 1 p, and 9pm           Subjective:    Ms Tobi Ballard is a right handed woman who presents for Movement follow up for Parkinson's disease  She was previous followed by Dr Daymon Allen Lennox Silence of his notes and chart reveal symptoms began with right arm tremor in 2016 Frist seen on Azilect with asymmetric rigidity R>L, rest tremor bradykinesia with decrement  Last seen September 2020 with her daughter having raise some concerns regarding her cognition and her driving ability  Ms Tobi Ballard was to hold off on driving until completing a fitness to drive evaluation  Aricept was started  She called stating Aricept caused increase tremor and she discontinued it  Her daughter Terrall Meckel called yesterday with concerns  She reported worsening memory  FTD evaluation was never completed and there is a concern regarding her driving  There is a concern regarding her drinking on the weekends  There is a questions as to whether she should continue PT and be encouraged to use a walker or cane  Current relevant medication:  Sinemet 25/100 1/2 tab 4 times daily- higher dose with "dizziness" (9,1,5,9)  rasagiline 1mg daily    Tremor present in her hand at times  Speech is soft  There is mild drooling  No difficulty chewing and swallowing  Mild difficulty with dressing and hygiene acts requiring assistance to dress and shower  There is no difficulty arising out of chair  No recent but a near fall  She has difficulty walking  Sleeps well  There is daytime sedation  She does have urinary urgency at night  There are no issues with urination  Chronic constipation for which she uses prune juice and uses Miralax  There is a questions of dizziness but she does not given a good history of lightheadedness on standing  Progression in cognition noted  She has confused names of family members  Objective:    Blood pressure 110/60, pulse 75, not currently breastfeeding  Physical Exam  Vitals reviewed  Eyes:      Extraocular Movements: Extraocular movements intact  Pupils: Pupils are equal, round, and reactive to light  Neurological:      Mental Status: She is alert  Deep Tendon Reflexes: Strength normal          Neurological Exam  Mental Status  Alert  Oriented only to person and situation  Speech: hypophonia  Language is fluent with no aphasia  Attention and concentration are normal     Cranial Nerves  CN III, IV, VI: Extraocular movements intact bilaterally  Pupils equal round and reactive to light bilaterally  CN VII: Full and symmetric facial movement    CN VIII: Hearing is normal   CN IX, X: Palate elevates symmetrically  CN XI: Shoulder shrug strength is normal   CN XII: Tongue midline without atrophy or fasciculations  Motor   Increased muscle tone  Strength is 5/5 throughout all four extremities  Sensory  Light touch is normal in upper and lower extremities  Coordination  Right: Finger-to-nose normal  Rapid alternating movement abnormality:Left: Finger-to-nose normal  Rapid alternating movement abnormality:  See MDS UPDRS III  Gait  Casual gait: Unable to rise from chair without using arms  Reduced stride, shuffling  No freezing  Hiral Prows UPDRS motor:                              Time since last dose:   11/4/21 7/6/22   Speech   1 1   Facial Expression    2   Rigidity - Neck    2   Rigidity - Upper Extremity (Right)   2 2   Rigidity - Upper Extremity (Left)    1 1   Rigidity - Lower Extremity (Right)   1 2   Rigidity - Lower Extremity (Left)    0 2   Finger Taps (Right)    2 2   Finger Taps (Left)    2 2   Hand Movement (Right)   2 2   Hand Movement (Left)    1 1   Pronation/Supination (Right)   2 2   Pronation/Supination (Left)    1 2   Toe Tapping (Right)  2 3   Toe Tapping (Left)  2 3   Leg Agility (Right)   2 2   Leg Agility (Left)    2 2   Arising from Chair    1 2   Gait    2 2   Freezing of Gait  0 0   Postural Stability        Posture  2 2   Global spontaneity of movement  2 2   Postural Tremor (Right)  0 0   Postural Tremor (Left)  0 0   Kinetic Tremor (Right)   0 0   Kinetic Tremor (Left)   0 0   Rest tremor amplitude RUE  1 1   Rest tremor amplitude LUE  0 0   Rest tremor amplitude RLE  0 1   Reset tremor amplitude LLE  0 1   Lip/Jaw Tremor   0 1   Consistency of tremor  1 2   Motor Exam Total:                ROS:    Review of Systems   Constitutional: Positive for appetite change (eats less) and fatigue  Negative for fever  HENT: Positive for voice change ( states she talks softer)  Negative for hearing loss, tinnitus and trouble swallowing  Eyes: Negative  Negative for photophobia and pain  Respiratory: Negative  Negative for shortness of breath      Cardiovascular: Negative  Negative for palpitations  Gastrointestinal: Negative  Negative for nausea and vomiting  Endocrine: Negative  Negative for cold intolerance  Genitourinary: Negative  Negative for dysuria, frequency and urgency  Musculoskeletal: Positive for back pain (Lower back) and gait problem  Negative for myalgias and neck pain  Balance Issues     Skin: Negative  Negative for rash  Allergic/Immunologic: Negative  Neurological: Positive for tremors (Left Arm)  Negative for dizziness, seizures, syncope, facial asymmetry, speech difficulty, weakness (Patient states no but  and daughter state otherwise), light-headedness, numbness and headaches  Hematological: Bruises/bleeds easily  Psychiatric/Behavioral: Positive for confusion (Not constant just at times, as stated by )  Negative for hallucinations and sleep disturbance  All other systems reviewed and are negative

## 2022-08-26 DIAGNOSIS — I10 ESSENTIAL HYPERTENSION: ICD-10-CM

## 2022-08-26 RX ORDER — AMLODIPINE BESYLATE 5 MG/1
5 TABLET ORAL DAILY
Qty: 90 TABLET | Refills: 1 | Status: SHIPPED | OUTPATIENT
Start: 2022-08-26

## 2022-09-08 DIAGNOSIS — G20 PARKINSON DISEASE: Primary | ICD-10-CM

## 2022-09-09 ENCOUNTER — TELEPHONE (OUTPATIENT)
Dept: FAMILY MEDICINE CLINIC | Facility: CLINIC | Age: 77
End: 2022-09-09

## 2022-09-09 NOTE — TELEPHONE ENCOUNTER
[address] [facility]  Fadumo Molina, PharmD, BCPS, BCACP     Maria D Meraz is a 76 y o  female  Spoke with patient's , Sue Caicedo, per 231 \[facility] [place] [facility]\"" communication form  He presents via telephone for initial clinical pharmacist consult  Reason for visit: medication review  This virtual check-in was done via telephone  Encounter provider: Juan Daniel Morgan    Patient agrees to participate in a virtual check in via telephone or video visit instead of presenting to the office to address urgent/immediate medical needs  After connecting, the patient was identified by name and date of birth  Maria D Meraz was informed that this was a telemedicine visit and that the exam was being conducted via [employer] and patient was informed that this is a secure, HIPAA-compliant platform  She agrees to proceed  Patient is located in the following state in which I hold an active license ([place])  My office door was closed  No one else was in the room  Maria D Meraz acknowledged consent and understanding of privacy and security of the telemedicine visit  I informed the patient that I have reviewed her record in Epic and presented the opportunity for her to ask any questions regarding the visit today  The patient agreed to participate  Recommendations: The following actions were taken today by the Clinical Pharmacist:   1  Will evaluate lower cost options for rasagiline  2  Limited options for eye drops  Patient not eligible to use coupon cards  Difficult to explore therapeutic alternatives given they are prescribed by external provier  Follow-up:   Follow-up with pharmacist PRN  Next PCP visit: none yet scheduled    Discussion:      Patient's  reports having financial difficulty obtaining several medications including: rasagiline, Alphagan P, and Xalatan        Findings:      Neurology: Doyle Auguste MD    Patient has been seen by [facility] for Parkinson's Disease since at least 2018  She was already on rasagiline at first visit to [facility]  No trials of other MAO-B inhibitors documented  Patient's eye drops are prescribed by external provider  She currently has [facility] Medicare prescription drug coverage  Rasagiline is considered non-preferred brand medication       Pharmacist Tracking Tool  Reason For Outreach: Embedded Pharmacist  Demographics:  Intervention Method: Phone  Type of Intervention: New  Topics Addressed: Other  Pharmacologic Interventions: N/A  Non-Pharmacologic Interventions: Cost  Time:  Direct Patient Care: 15 mins  Care Coordination: 20 mins  Recommendation Recipient: Patient/Caregiver  Outcome: Pending/ Follow-up Required

## 2022-09-14 DIAGNOSIS — G20 PARKINSON DISEASE (HCC): Primary | ICD-10-CM

## 2022-09-14 NOTE — TELEPHONE ENCOUNTER
If cost is the issue, I am ok with the switch   Would have her take 5mg qm and second dose in the afternoon to avoid any sleep disturbance

## 2022-09-14 NOTE — TELEPHONE ENCOUNTER
[address] [facility], BCPS, BCACP     Communication with patient: per telephone (spouse)     Reason for documentation: follow-up    Recommendations: The following actions were taken today by the Clinical Pharmacist:   1  Will reach out to Neurology to discuss potential change from rasagiline to selegiline  2  Will contact Ghulam Cabrera Teodoro regarding cost    Follow-up:   Follow-up with pharmacist PRN  Next PCP visit: not yet scheduled      Findings:      Selegiline is preferred agent on patient's insurance and available at a lower tier than rasagiline  Discussion:      Spoke with patient's   He would like Neurology approval before any medication change   Would also like to know cost savings information from his insurance      Pharmacist Tracking Tool  Reason For Outreach: Embedded Pharmacist  Demographics:  Intervention Method: Phone  Type of Intervention: Follow-Up  Topics Addressed: Other  Pharmacologic Interventions: N/A  Non-Pharmacologic Interventions: Cost  Time:  Direct Patient Care: 10 mins  Care Coordination: 25 mins  Recommendation Recipient: Patient/Caregiver  Outcome: Pending/ Follow-up Required

## 2022-09-19 NOTE — TELEPHONE ENCOUNTER
[address] [facility]  Judah Wiggins, PharmD, BCPS, BCACP     Communication with patient: per telephone (spouse)     Reason for documentation: follow-up     Recommendations:      The following actions were taken today by the Clinical Pharmacist:   1  Switch patient from rasagiline to selegiline 5 mg QAM + second dose in the afternoon  · Will pend Rx for Neurology signature/concurrence    Follow-up:   Follow-up with pharmacist PRN  Next PCP visit: not yet scheduled      Findings:      Spoke with patient's insurance company  Per test claim, selegiline is $45/30-day supply at [facility], which is a non-preferred pharmacy  If patient switches to preferred pharmacy, cost will be $39/30-day supply       Discussion:      Conveyed information patient's   Neurology is supportive of change in therapy  Very happy with reduced cost, states this is <50% of current cost of rasagiline   Would like to receive 90-day supply of medication from [facility]       Pharmacist Tracking Tool  Reason For Outreach: Embedded Pharmacist  Demographics:  Intervention Method: Phone  Type of Intervention: Follow-Up  Topics Addressed: Other  Pharmacologic Interventions: N/A  Non-Pharmacologic Interventions: Cost  Time:  Direct Patient Care: 5 mins  Care Coordination: 15 mins  Recommendation Recipient: Patient/Caregiver and Provider  Outcome: Accepted

## 2022-09-20 RX ORDER — SELEGILINE HYDROCHLORIDE 5 MG/1
CAPSULE ORAL
Qty: 180 CAPSULE | Refills: 1 | Status: SHIPPED | OUTPATIENT
Start: 2022-09-20 | End: 2022-09-23

## 2022-09-21 DIAGNOSIS — G20 PARKINSON DISEASE (HCC): ICD-10-CM

## 2022-09-23 RX ORDER — SELEGILINE HYDROCHLORIDE 5 MG/1
5 CAPSULE ORAL
Qty: 180 CAPSULE | Refills: 0 | Status: SHIPPED | OUTPATIENT
Start: 2022-09-23

## 2022-09-23 RX ORDER — SELEGILINE HYDROCHLORIDE 5 MG/1
CAPSULE ORAL
Qty: 180 CAPSULE | Refills: 1 | Status: SHIPPED | OUTPATIENT
Start: 2022-09-23 | End: 2022-09-23 | Stop reason: CLARIF

## 2022-09-26 DIAGNOSIS — E78.5 HYPERLIPIDEMIA, UNSPECIFIED HYPERLIPIDEMIA TYPE: ICD-10-CM

## 2022-09-26 RX ORDER — ATORVASTATIN CALCIUM 10 MG/1
TABLET, FILM COATED ORAL
Qty: 90 TABLET | Refills: 1 | Status: SHIPPED | OUTPATIENT
Start: 2022-09-26

## 2022-09-28 DIAGNOSIS — I10 HYPERTENSION, ESSENTIAL: ICD-10-CM

## 2022-09-28 RX ORDER — METOPROLOL TARTRATE 50 MG/1
50 TABLET, FILM COATED ORAL DAILY
Qty: 90 TABLET | Refills: 1 | Status: SHIPPED | OUTPATIENT
Start: 2022-09-28

## 2022-11-10 ENCOUNTER — OFFICE VISIT (OUTPATIENT)
Dept: FAMILY MEDICINE CLINIC | Facility: CLINIC | Age: 77
End: 2022-11-10

## 2022-11-10 VITALS
HEART RATE: 70 BPM | DIASTOLIC BLOOD PRESSURE: 72 MMHG | BODY MASS INDEX: 22.7 KG/M2 | RESPIRATION RATE: 14 BRPM | HEIGHT: 59 IN | TEMPERATURE: 98.6 F | OXYGEN SATURATION: 98 % | WEIGHT: 112.6 LBS | SYSTOLIC BLOOD PRESSURE: 104 MMHG

## 2022-11-10 DIAGNOSIS — I48.92 ATRIAL FLUTTER, UNSPECIFIED TYPE (HCC): ICD-10-CM

## 2022-11-10 DIAGNOSIS — I70.0 ATHEROSCLEROSIS OF AORTA (HCC): ICD-10-CM

## 2022-11-10 DIAGNOSIS — E78.5 HYPERLIPIDEMIA, UNSPECIFIED HYPERLIPIDEMIA TYPE: ICD-10-CM

## 2022-11-10 DIAGNOSIS — Z00.00 MEDICARE ANNUAL WELLNESS VISIT, SUBSEQUENT: Primary | ICD-10-CM

## 2022-11-10 DIAGNOSIS — Z79.899 HIGH RISK MEDICATION USE: ICD-10-CM

## 2022-11-10 DIAGNOSIS — Z13.6 SCREENING FOR CARDIOVASCULAR CONDITION: ICD-10-CM

## 2022-11-10 DIAGNOSIS — D50.9 MICROCYTIC HYPOCHROMIC ANEMIA: ICD-10-CM

## 2022-11-10 DIAGNOSIS — G20 PARKINSON DISEASE (HCC): ICD-10-CM

## 2022-11-10 PROBLEM — R05.8 POST-VIRAL COUGH SYNDROME: Status: RESOLVED | Noted: 2018-12-10 | Resolved: 2022-11-10

## 2022-11-10 PROBLEM — I49.9 ARRHYTHMIA: Status: RESOLVED | Noted: 2021-11-08 | Resolved: 2022-11-10

## 2022-11-10 PROBLEM — E43 SEVERE PROTEIN-CALORIE MALNUTRITION (HCC): Status: RESOLVED | Noted: 2021-11-10 | Resolved: 2022-11-10

## 2022-11-10 PROBLEM — Z23 NEED FOR IMMUNIZATION AGAINST INFLUENZA: Status: RESOLVED | Noted: 2019-11-04 | Resolved: 2022-11-10

## 2022-11-10 PROBLEM — I16.0 HYPERTENSIVE URGENCY: Status: RESOLVED | Noted: 2021-07-11 | Resolved: 2022-11-10

## 2022-11-10 PROBLEM — R55 SYNCOPE: Status: RESOLVED | Noted: 2021-11-08 | Resolved: 2022-11-10

## 2022-11-10 PROBLEM — R42 DIZZINESS: Status: RESOLVED | Noted: 2021-11-09 | Resolved: 2022-11-10

## 2022-11-10 PROBLEM — D62 ACUTE BLOOD LOSS ANEMIA: Status: RESOLVED | Noted: 2021-07-12 | Resolved: 2022-11-10

## 2022-11-10 PROBLEM — G25.0 ESSENTIAL TREMOR: Status: RESOLVED | Noted: 2020-03-11 | Resolved: 2022-11-10

## 2022-11-10 NOTE — PATIENT INSTRUCTIONS
Fall Prevention   AMBULATORY CARE:   Fall prevention  includes ways to make your home and other areas safer  It also includes ways you can move more carefully to prevent a fall  Health conditions that cause changes in your blood pressure, vision, or muscle strength and coordination may increase your risk for falls  Medicines may also increase your risk for falls if they make you dizzy, weak, or sleepy  Call 911 or have someone else call if:   · You have fallen and are unconscious  · You have fallen and cannot move part of your body  Contact your healthcare provider if:   · You have fallen and have pain or a headache  · You have questions or concerns about your condition or care  Fall prevention tips:   · Stand or sit up slowly  This may help you keep your balance and prevent falls  · Use assistive devices as directed  Your healthcare provider may suggest that you use a cane or walker to help you keep your balance  You may need to have grab bars put in your bathroom near the toilet or in the shower  · Wear shoes that fit well and have soles that   Wear shoes both inside and outside  Use slippers with good   Do not wear shoes with high heels  · Wear a personal alarm  This is a device that allows you to call 911 if you fall and need help  Ask your healthcare provider for more information  · Stay active  Exercise can help strengthen your muscles and improve your balance  Your healthcare provider may recommend water aerobics or walking  He or she may also recommend physical therapy to improve your coordination  Never start an exercise program without talking to your healthcare provider first          · Manage your medical conditions  Keep all appointments with your healthcare providers  Visit your eye doctor as directed  Home safety tips:       · Add items to prevent falls in the bathroom  Put nonslip strips on your bath or shower floor to prevent you from slipping   Use a bath mat if you do not have carpet in the bathroom  This will prevent you from falling when you step out of the bath or shower  Use a shower seat so you do not need to stand while you shower  Sit on the toilet or a chair in your bathroom to dry yourself and put on clothing  This will prevent you from losing your balance from drying or dressing yourself while you are standing  · Keep paths clear  Remove books, shoes, and other objects from walkways and stairs  Place cords for telephones and lamps out of the way so that you do not need to walk over them  Tape them down if you cannot move them  Remove small rugs  If you cannot remove a rug, secure it with double-sided tape  This will prevent you from tripping  · Install bright lights in your home  Use night lights to help light paths to the bathroom or kitchen  Always turn on the light before you start walking  · Keep items you use often on shelves within reach  Do not use a step stool to help you reach an item  · Paint or place reflective tape on the edges of your stairs  This will help you see the stairs better  Follow up with your doctor as directed:  Write down your questions so you remember to ask them during your visits  © Copyright [employer] 2022 Information is for End User's use only and may not be sold, redistributed or otherwise used for commercial purposes  All illustrations and images included in CareNotes® are the copyrighted property of A D A M , Inc  or Saad Conley   The above information is an  only  It is not intended as medical advice for individual conditions or treatments  Talk to your doctor, nurse or pharmacist before following any medical regimen to see if it is safe and effective for you

## 2022-11-10 NOTE — PROGRESS NOTES
Assessment and Plan:     Problem List Items Addressed This Visit        Cardiovascular and Mediastinum    [facility] )       Nervous and Auditory    Parkinson disease ([facility] )      Other Visit Diagnoses     Medicare annual wellness visit, subsequent    -  Primary    Atherosclerosis of aorta ([facility] )          Medicare wellness visit completed  Patient follow-up with Neurology she will continue her current medications  She is up-to-date on health maintenance   She can follow up in 6 months or sooner if needed          Depression Screening and Follow-up Plan: Patient was screened for depression during today's encounter  They screened negative with a PHQ-2 score of 0  Falls Plan of Care: balance, strength, and gait training instructions were provided  Preventive health issues were discussed with patient, and age appropriate screening tests were ordered as noted in patient's After Visit Summary  Personalized health advice and appropriate referrals for health education or preventive services given if needed, as noted in patient's After Visit Summary  History of Present Illness:     Patient presents for a Medicare Wellness Visit    Patient presents today for Medicare wellness visit   She has no acute complaints today   She is still following with neurology for Parkinson's     Patient Care Team:  Bam Green DO as PCP - General  MANE Amaral DO     Review of Systems:     Review of Systems   Constitutional: Negative  HENT: Negative  Eyes: Negative  Respiratory: Negative  Cardiovascular: Negative  Gastrointestinal: Negative  Endocrine: Negative  Genitourinary: Negative  Musculoskeletal: Negative  Skin: Negative  Allergic/Immunologic: Negative  Neurological: Positive for tremors and weakness  Hematological: Negative  Psychiatric/Behavioral: Negative  All other systems reviewed and are negative         Problem List:     Patient Active Problem List   Diagnosis   • Abdominal aortic atherosclerosis (HCC)   • Ambulatory dysfunction   • Anemia due to poor nutrition   • Bicipital tendinitis of left shoulder   • Cataract, left   • Chronic anxiety   • Chronic constipation   • Gastroesophageal reflux disease   • Glaucoma   • Essential hypertension   • Microcytic hypochromic anemia   • Low back pain   • Lumbosacral spinal stenosis   • Osteopenia   • Parkinson disease (HCC)   • Lumbar spondylosis   • Chronic pain syndrome   • Disorder of intervertebral disc   • Hyperlipidemia   • Lumbar radiculopathy   • 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 ([facility] )   • Closed displaced fracture of left femoral neck ([facility])   • Atrial flutter, unspecified type [facility])      Past Medical and Surgical History:     Past Medical History:   Diagnosis Date   • Constipation    • Hyperlipidemia    • Hypertension    • Parkinson disease ([facility] )      Past Surgical History:   Procedure Laterality Date   •  SECTION     • CHOLECYSTECTOMY     • SC PARTIAL HIP REPLACEMENT Left 2021    Procedure: HEMIARTHROPLASTY HIP (BIPOLAR);   Surgeon: Fadia Wyman MD;  Location: [facility];  Service: Orthopedics      Family History:     Family History   Problem Relation Age of Onset   • Tremor Neg Hx    • Parkinsonism Neg Hx       Social History:     Social History     Socioeconomic History   • Marital status: /Civil Union     Spouse name: None   • Number of children: None   • Years of education: None   • Highest education level: None   Occupational History   • None   Tobacco Use   • Smoking status: Former Smoker     Packs/day: 0 25     Types: Cigarettes     Start date:      Quit date: 2011     Years since quittin 2   • Smokeless tobacco: Never Used   Vaping Use   • Vaping Use: Never used   Substance and Sexual Activity   • Alcohol use: Yes     Comment: Socially   • Drug use: No   • Sexual activity: Not Currently   Other Topics Concern   • None   Social History Narrative     at [employer]  Education through 12th grade  Home with  and dog (toy puddle)  3 daughters in the area  Social Determinants of Health     Financial Resource Strain: Not on file   Food Insecurity: Not on file   Transportation Needs: Not on file   Physical Activity: Not on file   Stress: Not on file   Social Connections: Not on file   Intimate Partner Violence: Not on file   Housing Stability: Not on file      Medications and Allergies:     Current Outpatient Medications   Medication Sig Dispense Refill   • acetaminophen (TYLENOL) 325 mg tablet Take 3 tablets (975 mg total) by mouth every 8 (eight) hours 30 tablet 0   • Alphagan P 0 1 % INSTILL 1 DROP INTO EACH EYE TWICE DAILY     • amLODIPine (NORVASC) 5 mg tablet Take 1 tablet (5 mg total) by mouth daily 90 tablet 1   • aspirin (ECOTRIN) 325 mg EC tablet Take 325 mg by mouth every 6 (six) hours as needed for mild pain     • atorvastatin (LIPITOR) 10 mg tablet Take one tab at night 90 tablet 1   • carbidopa-levodopa (Sinemet)  mg per tablet 1 tab at 9am, and 5pm and 1/2 tab 1 p, and 9pm 90 tablet 8   • docusate sodium (COLACE) 100 mg capsule Take 1 capsule (100 mg total) by mouth 2 (two) times a day 10 capsule 0   • esomeprazole (NexIUM) 20 mg capsule Take 20 mg by mouth every morning before breakfast     • latanoprost (XALATAN) 0 005 % ophthalmic solution 1 drop daily at bedtime     • metoprolol tartrate (LOPRESSOR) 50 mg tablet Take 1 tablet (50 mg total) by mouth daily 90 tablet 1   • polyethylene glycol (MIRALAX) 17 g packet Take 17 g by mouth daily as needed (constipation) for up to 14 days 238 g 0   • selegiline (ELDEPRYL) 5 mg capsule Take 1 capsule (5 mg total) by mouth 2 (two) times a day before breakfast and lunch 180 capsule 0   • timolol (BETIMOL) 0 5 % ophthalmic solution Administer 1 drop to both eyes daily        No current facility-administered medications for this visit       No Known Allergies Immunizations:     Immunization History   Administered Date(s) Administered   • COVID-19 PFIZER VACCINE 0 3 ML IM 03/19/2021, 04/09/2021, 12/10/2021   • Influenza, high dose seasonal 0 7 mL 11/04/2019      Health Maintenance:         Topic Date Due   • Hepatitis C Screening  Never done         Topic Date Due   • Pneumococcal Vaccine: 65+ Years (1 - PCV) Never done   • COVID-19 Vaccine (4 - Booster for Pfizer series) 04/10/2022   • Influenza Vaccine (1) 09/01/2022      Medicare Screening Tests and Risk Assessments: Kenton Estes is here for her Subsequent Wellness visit  Last Medicare Wellness visit information reviewed, patient interviewed and updates made to the record today  Health Risk Assessment:   Patient rates overall health as fair  Patient feels that their physical health rating is same  Patient is satisfied with their life  Eyesight was rated as same  Hearing was rated as same  Patient feels that their emotional and mental health rating is same  Patients states they are never, rarely angry  Patient states they are sometimes unusually tired/fatigued  Pain experienced in the last 7 days has been none  Patient states that she has experienced no weight loss or gain in last 6 months  Depression Screening:   PHQ-2 Score: 0      Fall Risk Screening: In the past year, patient has experienced: no history of falling in past year      Urinary Incontinence Screening:   Patient has not leaked urine accidently in the last six months  Home Safety:  Patient has trouble with stairs inside or outside of their home  Patient has working smoke alarms and has working carbon monoxide detector  Home safety hazards include: none  Medications:   Patient is currently taking over-the-counter supplements  OTC medications include: see medication list  Patient is able to manage medications       Activities of Daily Living (ADLs)/Instrumental Activities of Daily Living (IADLs):   Walk and transfer into and out of bed and chair?: Yes  Dress and groom yourself?: Yes    Bathe or shower yourself?: Yes    Feed yourself? Yes  Do your laundry/housekeeping?: Yes  Manage your money, pay your bills and track your expenses?: Yes  Make your own meals?: Yes    Do your own shopping?: Yes    Previous Hospitalizations:   Any hospitalizations or ED visits within the last 12 months?: No      Advance Care Planning:   Living will: Yes    Durable POA for healthcare: Yes    Advanced directive: Yes    Advanced directive counseling given: Yes    Five wishes given: No    End of Life Decisions reviewed with patient: Yes    Provider agrees with end of life decisions: Yes      Cognitive Screening:   Provider or family/friend/caregiver concerned regarding cognition?: No    PREVENTIVE SCREENINGS      Cardiovascular Screening:    General: History Lipid Disorder and Screening Current      Diabetes Screening:     General: Screening Current      Colorectal Cancer Screening:     General: Screening Not Indicated      Breast Cancer Screening:     General: Screening Not Indicated      Cervical Cancer Screening:    General: Screening Not Indicated      Osteoporosis Screening:    General: Patient Declines      Abdominal Aortic Aneurysm (AAA) Screening:        General: Screening Not Indicated      Lung Cancer Screening:     General: Screening Not Indicated      Hepatitis C Screening:    General: Screening Not Indicated    Screening, Brief Intervention, and Referral to Treatment (SBIRT)    Screening    Typical number of drinks in a week: 0    Brief Intervention  Alcohol & drug use screenings were reviewed  No concerns regarding substance use disorder identified  Other Counseling Topics:   Car/seat belt/driving safety, skin self-exam, sunscreen and regular weightbearing exercise and calcium and vitamin D intake        Visual Acuity Screening    Right eye Left eye Both eyes   Without correction:      With correction:   20/30        Physical Exam:     /72 (BP Location: Left arm, Patient Position: Sitting, Cuff Size: Standard)   Pulse 70   Temp 98 6 °F (37 °C)   Resp 14   Ht 4' 11" (1 499 m)   Wt 51 1 kg (112 lb 9 6 oz)   LMP  (LMP Unknown)   SpO2 98%   BMI 22 74 kg/m²     Physical Exam  Constitutional:       Appearance: She is well-developed  HENT:      Head: Normocephalic  Right Ear: External ear normal       Left Ear: External ear normal       Nose: Nose normal    Eyes:      Conjunctiva/sclera: Conjunctivae normal       Pupils: Pupils are equal, round, and reactive to light  Cardiovascular:      Rate and Rhythm: Normal rate and regular rhythm  Heart sounds: Normal heart sounds  Pulmonary:      Effort: Pulmonary effort is normal       Breath sounds: Normal breath sounds  Abdominal:      General: Bowel sounds are normal       Palpations: Abdomen is soft  Musculoskeletal:         General: Normal range of motion  Cervical back: Normal range of motion and neck supple  Skin:     General: Skin is warm and dry  Neurological:      Mental Status: She is alert and oriented to person, place, and time  Psychiatric:         Behavior: Behavior normal          Thought Content: Thought content normal          Judgment: Judgment normal           Deejay Page, DO  Falls Plan of Care: Balance, strength, and gait training instructions were provided

## 2022-11-18 ENCOUNTER — APPOINTMENT (OUTPATIENT)
Dept: LAB | Facility: CLINIC | Age: 77
End: 2022-11-18

## 2022-11-18 DIAGNOSIS — Z79.899 HIGH RISK MEDICATION USE: ICD-10-CM

## 2022-11-18 DIAGNOSIS — Z13.6 SCREENING FOR CARDIOVASCULAR CONDITION: ICD-10-CM

## 2022-11-18 DIAGNOSIS — E78.5 HYPERLIPIDEMIA, UNSPECIFIED HYPERLIPIDEMIA TYPE: ICD-10-CM

## 2022-11-18 DIAGNOSIS — D50.9 MICROCYTIC HYPOCHROMIC ANEMIA: ICD-10-CM

## 2022-11-18 LAB
ALBUMIN SERPL BCP-MCNC: 3.6 G/DL (ref 3.5–5)
ALP SERPL-CCNC: 81 U/L (ref 46–116)
ALT SERPL W P-5'-P-CCNC: 24 U/L (ref 12–78)
ANION GAP SERPL CALCULATED.3IONS-SCNC: 8 MMOL/L (ref 4–13)
AST SERPL W P-5'-P-CCNC: 14 U/L (ref 5–45)
BACTERIA UR QL AUTO: ABNORMAL /HPF
BASOPHILS # BLD AUTO: 0.03 THOUSANDS/ÂΜL (ref 0–0.1)
BASOPHILS NFR BLD AUTO: 1 % (ref 0–1)
BILIRUB SERPL-MCNC: 0.94 MG/DL (ref 0.2–1)
BILIRUB UR QL STRIP: NEGATIVE
BUN SERPL-MCNC: 11 MG/DL (ref 5–25)
CALCIUM SERPL-MCNC: 8.7 MG/DL (ref 8.3–10.1)
CHLORIDE SERPL-SCNC: 98 MMOL/L (ref 96–108)
CHOLEST SERPL-MCNC: 140 MG/DL
CLARITY UR: CLEAR
CO2 SERPL-SCNC: 26 MMOL/L (ref 21–32)
COLOR UR: ABNORMAL
CREAT SERPL-MCNC: 0.83 MG/DL (ref 0.6–1.3)
EOSINOPHIL # BLD AUTO: 0.08 THOUSAND/ÂΜL (ref 0–0.61)
EOSINOPHIL NFR BLD AUTO: 2 % (ref 0–6)
ERYTHROCYTE [DISTWIDTH] IN BLOOD BY AUTOMATED COUNT: 17.2 % (ref 11.6–15.1)
GFR SERPL CREATININE-BSD FRML MDRD: 67 ML/MIN/1.73SQ M
GLUCOSE P FAST SERPL-MCNC: 99 MG/DL (ref 65–99)
GLUCOSE UR STRIP-MCNC: NEGATIVE MG/DL
HCT VFR BLD AUTO: 33.9 % (ref 34.8–46.1)
HDLC SERPL-MCNC: 74 MG/DL
HGB BLD-MCNC: 10.2 G/DL (ref 11.5–15.4)
HGB UR QL STRIP.AUTO: NEGATIVE
IMM GRANULOCYTES # BLD AUTO: 0.01 THOUSAND/UL (ref 0–0.2)
IMM GRANULOCYTES NFR BLD AUTO: 0 % (ref 0–2)
KETONES UR STRIP-MCNC: NEGATIVE MG/DL
LDLC SERPL CALC-MCNC: 51 MG/DL (ref 0–100)
LEUKOCYTE ESTERASE UR QL STRIP: ABNORMAL
LYMPHOCYTES # BLD AUTO: 1.88 THOUSANDS/ÂΜL (ref 0.6–4.47)
LYMPHOCYTES NFR BLD AUTO: 34 % (ref 14–44)
MCH RBC QN AUTO: 20.9 PG (ref 26.8–34.3)
MCHC RBC AUTO-ENTMCNC: 30.1 G/DL (ref 31.4–37.4)
MCV RBC AUTO: 69 FL (ref 82–98)
MONOCYTES # BLD AUTO: 0.51 THOUSAND/ÂΜL (ref 0.17–1.22)
MONOCYTES NFR BLD AUTO: 9 % (ref 4–12)
NEUTROPHILS # BLD AUTO: 2.99 THOUSANDS/ÂΜL (ref 1.85–7.62)
NEUTS SEG NFR BLD AUTO: 54 % (ref 43–75)
NITRITE UR QL STRIP: NEGATIVE
NON-SQ EPI CELLS URNS QL MICRO: ABNORMAL /HPF
NONHDLC SERPL-MCNC: 66 MG/DL
NRBC BLD AUTO-RTO: 0 /100 WBCS
PH UR STRIP.AUTO: 6.5 [PH]
PLATELET # BLD AUTO: 268 THOUSANDS/UL (ref 149–390)
PMV BLD AUTO: 10.7 FL (ref 8.9–12.7)
POTASSIUM SERPL-SCNC: 3.7 MMOL/L (ref 3.5–5.3)
PROT SERPL-MCNC: 7.1 G/DL (ref 6.4–8.4)
PROT UR STRIP-MCNC: NEGATIVE MG/DL
RBC # BLD AUTO: 4.89 MILLION/UL (ref 3.81–5.12)
RBC #/AREA URNS AUTO: ABNORMAL /HPF
SODIUM SERPL-SCNC: 132 MMOL/L (ref 135–147)
SP GR UR STRIP.AUTO: 1.01 (ref 1–1.03)
TRIGL SERPL-MCNC: 74 MG/DL
TSH SERPL DL<=0.05 MIU/L-ACNC: 1.52 UIU/ML (ref 0.45–4.5)
UROBILINOGEN UR STRIP-ACNC: <2 MG/DL
WBC # BLD AUTO: 5.5 THOUSAND/UL (ref 4.31–10.16)
WBC #/AREA URNS AUTO: ABNORMAL /HPF

## 2022-11-22 ENCOUNTER — TELEPHONE (OUTPATIENT)
Dept: NEUROLOGY | Facility: CLINIC | Age: 77
End: 2022-11-22

## 2022-11-22 NOTE — TELEPHONE ENCOUNTER
Called and spoke to pt's  Malachi-ok per communication consent  Stated that her Rasagiline was change to  Selegiline due to cost Pt's  stated that today they will be finishing Rasalgiline because they have some left and ready to start with Selegiline tomorrow,but they wanted to make sure it is ok to start  tomorrow  Stated that they were given a slip when they picked up Selegiline and one of the warning was don't take within 14 days of taking Rasagiline due to risk of increase of BP,so  he is calling to check  And his other questions is ,if it is safe to be given with her Sinemet    Current dose of Sinemet 25/100 1/2 tab at [id number]   CB#[phone] to leave a detailed message    Please advise

## 2022-11-22 NOTE — TELEPHONE ENCOUNTER
Received the following vm:     My name is Bernadette Foster  I'm calling for my wife, Kadie McDuffie  Her birth date is [birth year]  And the reason I'm calling is I have a question she's, she's changing her medication  I just Manohar Kilpatrick make sure that everything's okay for her to start taking them tomorrow  I'll be waiting to hear from you  Thank you very much  Goodbye      #: [phone]

## 2022-11-23 NOTE — TELEPHONE ENCOUNTER
Renetta Reyes MD  to Me      5:57 AM  It is okay to start tomorrow given he is switching from one to other

## 2023-01-10 ENCOUNTER — HOSPITAL ENCOUNTER (INPATIENT)
Facility: HOSPITAL | Age: 78
LOS: 1 days | Discharge: HOME HEALTH | End: 2023-01-11
Attending: EMERGENCY MEDICINE | Admitting: INTERNAL MEDICINE

## 2023-01-10 ENCOUNTER — APPOINTMENT (EMERGENCY)
Dept: CT IMAGING | Facility: HOSPITAL | Age: 78
End: 2023-01-10

## 2023-01-10 ENCOUNTER — APPOINTMENT (EMERGENCY)
Dept: RADIOLOGY | Facility: HOSPITAL | Age: 78
End: 2023-01-10

## 2023-01-10 DIAGNOSIS — S00.81XA FACIAL ABRASION, INITIAL ENCOUNTER: ICD-10-CM

## 2023-01-10 DIAGNOSIS — S22.20XA CLOSED FRACTURE OF STERNUM, UNSPECIFIED PORTION OF STERNUM, INITIAL ENCOUNTER: Primary | ICD-10-CM

## 2023-01-10 DIAGNOSIS — G20 PARKINSON'S DISEASE (HCC): ICD-10-CM
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
## 2023-01-10 DIAGNOSIS — E87.1 HYPONATREMIA: ICD-10-CM

## 2023-01-10 LAB
ANION GAP SERPL CALCULATED.3IONS-SCNC: 2 MMOL/L (ref 4–13)
ANION GAP SERPL CALCULATED.3IONS-SCNC: 4 MMOL/L (ref 4–13)
BASOPHILS # BLD AUTO: 0.01 THOUSANDS/ÂΜL (ref 0–0.1)
BASOPHILS NFR BLD AUTO: 0 % (ref 0–1)
BUN SERPL-MCNC: 13 MG/DL (ref 5–25)
BUN SERPL-MCNC: 13 MG/DL (ref 5–25)
CALCIUM SERPL-MCNC: 8.7 MG/DL (ref 8.3–10.1)
CALCIUM SERPL-MCNC: 8.7 MG/DL (ref 8.3–10.1)
CARDIAC TROPONIN I PNL SERPL HS: 4 NG/L
CHLORIDE SERPL-SCNC: 99 MMOL/L (ref 96–108)
CHLORIDE SERPL-SCNC: 99 MMOL/L (ref 96–108)
CO2 SERPL-SCNC: 26 MMOL/L (ref 21–32)
CO2 SERPL-SCNC: 28 MMOL/L (ref 21–32)
CREAT SERPL-MCNC: 0.82 MG/DL (ref 0.6–1.3)
CREAT SERPL-MCNC: 0.83 MG/DL (ref 0.6–1.3)
EOSINOPHIL # BLD AUTO: 0.04 THOUSAND/ÂΜL (ref 0–0.61)
EOSINOPHIL NFR BLD AUTO: 0 % (ref 0–6)
ERYTHROCYTE [DISTWIDTH] IN BLOOD BY AUTOMATED COUNT: 16.2 % (ref 11.6–15.1)
GFR SERPL CREATININE-BSD FRML MDRD: 67 ML/MIN/1.73SQ M
GFR SERPL CREATININE-BSD FRML MDRD: 68 ML/MIN/1.73SQ M
GLUCOSE SERPL-MCNC: 106 MG/DL (ref 65–140)
GLUCOSE SERPL-MCNC: 106 MG/DL (ref 65–140)
HCT VFR BLD AUTO: 36.5 % (ref 34.8–46.1)
HGB BLD-MCNC: 11.3 G/DL (ref 11.5–15.4)
IMM GRANULOCYTES # BLD AUTO: 0.09 THOUSAND/UL (ref 0–0.2)
IMM GRANULOCYTES NFR BLD AUTO: 1 % (ref 0–2)
LYMPHOCYTES # BLD AUTO: 1.43 THOUSANDS/ÂΜL (ref 0.6–4.47)
LYMPHOCYTES NFR BLD AUTO: 15 % (ref 14–44)
MCH RBC QN AUTO: 21.1 PG (ref 26.8–34.3)
MCHC RBC AUTO-ENTMCNC: 31 G/DL (ref 31.4–37.4)
MCV RBC AUTO: 68 FL (ref 82–98)
MONOCYTES # BLD AUTO: 0.64 THOUSAND/ÂΜL (ref 0.17–1.22)
MONOCYTES NFR BLD AUTO: 7 % (ref 4–12)
NEUTROPHILS # BLD AUTO: 7.35 THOUSANDS/ÂΜL (ref 1.85–7.62)
NEUTS SEG NFR BLD AUTO: 77 % (ref 43–75)
NRBC BLD AUTO-RTO: 0 /100 WBCS
PLATELET # BLD AUTO: 271 THOUSANDS/UL (ref 149–390)
PMV BLD AUTO: 9.7 FL (ref 8.9–12.7)
POTASSIUM SERPL-SCNC: 3.9 MMOL/L (ref 3.5–5.3)
POTASSIUM SERPL-SCNC: 5.9 MMOL/L (ref 3.5–5.3)
RBC # BLD AUTO: 5.36 MILLION/UL (ref 3.81–5.12)
SODIUM SERPL-SCNC: 129 MMOL/L (ref 135–147)
SODIUM SERPL-SCNC: 129 MMOL/L (ref 135–147)
WBC # BLD AUTO: 9.56 THOUSAND/UL (ref 4.31–10.16)

## 2023-01-10 RX ORDER — FENTANYL CITRATE 50 UG/ML
50 INJECTION, SOLUTION INTRAMUSCULAR; INTRAVENOUS
Status: DISCONTINUED | OUTPATIENT
Start: 2023-01-10 | End: 2023-01-11

## 2023-01-10 RX ADMIN — FENTANYL CITRATE 50 MCG: 50 INJECTION INTRAMUSCULAR; INTRAVENOUS at 23:41

## 2023-01-10 RX ADMIN — IOHEXOL 100 ML: 350 INJECTION, SOLUTION INTRAVENOUS at 22:17

## 2023-01-10 NOTE — Clinical Note
Case was discussed with Jose Alfredo Valles and the patient's admission status was agreed to be Admission Status: inpatient status to the service of Dr Millie Crespo

## 2023-01-11 VITALS
TEMPERATURE: 97.6 F | RESPIRATION RATE: 18 BRPM | WEIGHT: 112 LBS | SYSTOLIC BLOOD PRESSURE: 149 MMHG | BODY MASS INDEX: 22.58 KG/M2 | HEIGHT: 59 IN | OXYGEN SATURATION: 99 % | DIASTOLIC BLOOD PRESSURE: 68 MMHG | HEART RATE: 81 BPM

## 2023-01-11 PROBLEM — S22.20XA UNSPECIFIED FRACTURE OF STERNUM, INITIAL ENCOUNTER FOR CLOSED FRACTURE: Status: ACTIVE | Noted: 2023-01-10

## 2023-01-11 PROBLEM — E87.1 HYPONATREMIA: Status: ACTIVE | Noted: 2023-01-11

## 2023-01-11 LAB
ABO GROUP BLD: NORMAL
ANION GAP SERPL CALCULATED.3IONS-SCNC: 5 MMOL/L (ref 4–13)
ATRIAL RATE: 70 BPM
BASOPHILS # BLD AUTO: 0.02 THOUSANDS/ÂΜL (ref 0–0.1)
BASOPHILS NFR BLD AUTO: 0 % (ref 0–1)
BLD GP AB SCN SERPL QL: POSITIVE
BUN SERPL-MCNC: 14 MG/DL (ref 5–25)
CALCIUM SERPL-MCNC: 8.7 MG/DL (ref 8.3–10.1)
CHLORIDE SERPL-SCNC: 101 MMOL/L (ref 96–108)
CO2 SERPL-SCNC: 24 MMOL/L (ref 21–32)
CREAT SERPL-MCNC: 0.8 MG/DL (ref 0.6–1.3)
E AG RBC QL: NEGATIVE
EOSINOPHIL # BLD AUTO: 0.03 THOUSAND/ÂΜL (ref 0–0.61)
EOSINOPHIL NFR BLD AUTO: 0 % (ref 0–6)
ERYTHROCYTE [DISTWIDTH] IN BLOOD BY AUTOMATED COUNT: 15.9 % (ref 11.6–15.1)
GFR SERPL CREATININE-BSD FRML MDRD: 70 ML/MIN/1.73SQ M
GLUCOSE SERPL-MCNC: 105 MG/DL (ref 65–140)
HCT VFR BLD AUTO: 33 % (ref 34.8–46.1)
HGB BLD-MCNC: 10.3 G/DL (ref 11.5–15.4)
IMM GRANULOCYTES # BLD AUTO: 0.03 THOUSAND/UL (ref 0–0.2)
IMM GRANULOCYTES NFR BLD AUTO: 0 % (ref 0–2)
LYMPHOCYTES # BLD AUTO: 2 THOUSANDS/ÂΜL (ref 0.6–4.47)
LYMPHOCYTES NFR BLD AUTO: 21 % (ref 14–44)
MCH RBC QN AUTO: 21.1 PG (ref 26.8–34.3)
MCHC RBC AUTO-ENTMCNC: 31.2 G/DL (ref 31.4–37.4)
MCV RBC AUTO: 68 FL (ref 82–98)
MONOCYTES # BLD AUTO: 0.66 THOUSAND/ÂΜL (ref 0.17–1.22)
MONOCYTES NFR BLD AUTO: 7 % (ref 4–12)
NEUTROPHILS # BLD AUTO: 6.6 THOUSANDS/ÂΜL (ref 1.85–7.62)
NEUTS SEG NFR BLD AUTO: 72 % (ref 43–75)
NRBC BLD AUTO-RTO: 0 /100 WBCS
P AXIS: 56 DEGREES
PLATELET # BLD AUTO: 227 THOUSANDS/UL (ref 149–390)
PLATELET # BLD AUTO: 251 THOUSANDS/UL (ref 149–390)
PMV BLD AUTO: 9.7 FL (ref 8.9–12.7)
PMV BLD AUTO: 9.8 FL (ref 8.9–12.7)
POTASSIUM SERPL-SCNC: 3.9 MMOL/L (ref 3.5–5.3)
PR INTERVAL: 214 MS
QRS AXIS: -26 DEGREES
QRSD INTERVAL: 80 MS
QT INTERVAL: 420 MS
QTC INTERVAL: 453 MS
RBC # BLD AUTO: 4.87 MILLION/UL (ref 3.81–5.12)
RH BLD: NEGATIVE
SODIUM SERPL-SCNC: 130 MMOL/L (ref 135–147)
SPECIMEN EXPIRATION DATE: NORMAL
T WAVE AXIS: 68 DEGREES
VENTRICULAR RATE: 70 BPM
WBC # BLD AUTO: 9.34 THOUSAND/UL (ref 4.31–10.16)

## 2023-01-11 RX ORDER — DOCUSATE SODIUM 100 MG/1
100 CAPSULE, LIQUID FILLED ORAL 2 TIMES DAILY
Status: DISCONTINUED | OUTPATIENT
Start: 2023-01-11 | End: 2023-01-11 | Stop reason: HOSPADM

## 2023-01-11 RX ORDER — ACETAMINOPHEN 325 MG/1
650 TABLET ORAL EVERY 6 HOURS PRN
Status: DISCONTINUED | OUTPATIENT
Start: 2023-01-11 | End: 2023-01-11 | Stop reason: HOSPADM

## 2023-01-11 RX ORDER — ATORVASTATIN CALCIUM 10 MG/1
10 TABLET, FILM COATED ORAL
Status: DISCONTINUED | OUTPATIENT
Start: 2023-01-11 | End: 2023-01-11 | Stop reason: HOSPADM

## 2023-01-11 RX ORDER — HEPARIN SODIUM 5000 [USP'U]/ML
5000 INJECTION, SOLUTION INTRAVENOUS; SUBCUTANEOUS EVERY 8 HOURS SCHEDULED
Status: DISCONTINUED | OUTPATIENT
Start: 2023-01-11 | End: 2023-01-11 | Stop reason: HOSPADM

## 2023-01-11 RX ORDER — METOPROLOL TARTRATE 50 MG/1
50 TABLET, FILM COATED ORAL DAILY
Status: DISCONTINUED | OUTPATIENT
Start: 2023-01-11 | End: 2023-01-11 | Stop reason: HOSPADM

## 2023-01-11 RX ORDER — KETOROLAC TROMETHAMINE 30 MG/ML
15 INJECTION, SOLUTION INTRAMUSCULAR; INTRAVENOUS EVERY 6 HOURS PRN
Status: DISCONTINUED | OUTPATIENT
Start: 2023-01-11 | End: 2023-01-11

## 2023-01-11 RX ORDER — AMLODIPINE BESYLATE 5 MG/1
5 TABLET ORAL DAILY
Status: DISCONTINUED | OUTPATIENT
Start: 2023-01-11 | End: 2023-01-11 | Stop reason: HOSPADM

## 2023-01-11 RX ORDER — GINSENG 100 MG
1 CAPSULE ORAL ONCE
Status: COMPLETED | OUTPATIENT
Start: 2023-01-11 | End: 2023-01-11

## 2023-01-11 RX ORDER — PANTOPRAZOLE SODIUM 20 MG/1
20 TABLET, DELAYED RELEASE ORAL
Status: DISCONTINUED | OUTPATIENT
Start: 2023-01-11 | End: 2023-01-11 | Stop reason: HOSPADM

## 2023-01-11 RX ORDER — KETOROLAC TROMETHAMINE 30 MG/ML
30 INJECTION, SOLUTION INTRAMUSCULAR; INTRAVENOUS EVERY 6 HOURS PRN
Status: DISCONTINUED | OUTPATIENT
Start: 2023-01-11 | End: 2023-01-11

## 2023-01-11 RX ORDER — LATANOPROST 50 UG/ML
1 SOLUTION/ DROPS OPHTHALMIC
Status: DISCONTINUED | OUTPATIENT
Start: 2023-01-11 | End: 2023-01-11 | Stop reason: HOSPADM

## 2023-01-11 RX ORDER — SELEGILINE HYDROCHLORIDE 5 MG/1
5 CAPSULE ORAL
Status: DISCONTINUED | OUTPATIENT
Start: 2023-01-11 | End: 2023-01-11 | Stop reason: HOSPADM

## 2023-01-11 RX ORDER — KETOROLAC TROMETHAMINE 30 MG/ML
15 INJECTION, SOLUTION INTRAMUSCULAR; INTRAVENOUS EVERY 6 HOURS PRN
Status: DISCONTINUED | OUTPATIENT
Start: 2023-01-11 | End: 2023-01-11 | Stop reason: HOSPADM

## 2023-01-11 RX ADMIN — PANTOPRAZOLE SODIUM 20 MG: 20 TABLET, DELAYED RELEASE ORAL at 06:00

## 2023-01-11 RX ADMIN — LATANOPROST 1 DROP: 50 SOLUTION OPHTHALMIC at 02:05

## 2023-01-11 RX ADMIN — CARBIDOPA AND LEVODOPA 1 TABLET: 25; 100 TABLET ORAL at 08:44

## 2023-01-11 RX ADMIN — DOCUSATE SODIUM 100 MG: 100 CAPSULE, LIQUID FILLED ORAL at 08:43

## 2023-01-11 RX ADMIN — AMLODIPINE BESYLATE 5 MG: 5 TABLET ORAL at 08:44

## 2023-01-11 RX ADMIN — HEPARIN SODIUM 5000 UNITS: 5000 INJECTION INTRAVENOUS; SUBCUTANEOUS at 13:34

## 2023-01-11 RX ADMIN — BACITRACIN 1 SMALL APPLICATION: 500 OINTMENT TOPICAL at 01:57

## 2023-01-11 RX ADMIN — METOPROLOL TARTRATE 50 MG: 50 TABLET ORAL at 08:44

## 2023-01-11 RX ADMIN — KETOROLAC TROMETHAMINE 15 MG: 30 INJECTION, SOLUTION INTRAMUSCULAR; INTRAVENOUS at 01:57

## 2023-01-11 RX ADMIN — CARBIDOPA AND LEVODOPA 0.5 TABLET: 25; 100 TABLET ORAL at 13:34

## 2023-01-11 NOTE — ASSESSMENT & PLAN NOTE
· Home med: latanoprost (XALATAN) 0 005 % ophthalmic solution, Alphagan P 0 1 %, timolol (BETIMOL) 0 5 % ophthalmic solution

## 2023-01-11 NOTE — CASE MANAGEMENT
Case Management Assessment & Discharge Planning Note    Patient name Kit Layne  Location [address] [id number] MRN [id number]  : 1945 Date 2023       Current Admission Date: 1/10/2023  Current Admission Diagnosis:Unspecified fracture of sternum, initial encounter for closed fracture   Patient Active Problem List    Diagnosis Date Noted   • Hyponatremia 2023   • Unspecified fracture of sternum, initial encounter for closed fracture 01/10/2023   • Atrial flutter, unspecified type ([address] ) 11/10/2022   • Closed displaced fracture of left femoral neck ([address] ) 2021   • 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 ([address] ) 2021   • Lumbar radiculopathy 2020   • Disorder of intervertebral disc 2020   • Hyperlipidemia 2020   • Lumbar spondylosis 2019   • Chronic pain syndrome 2019   • Parkinson disease ([facility] ) 2018   • Anemia due to poor nutrition 10/31/2017   • Ambulatory dysfunction 10/17/2016   • Cataract, left 2016   • Bicipital tendinitis of left shoulder 09/15/2016   • Lumbosacral spinal stenosis 2016   • Low back pain 2015   • Chronic constipation 2015   • Abdominal aortic atherosclerosis ([facility] ) 2014   • Glaucoma 2014   • Microcytic hypochromic anemia 2014   • Osteopenia 2014   • Chronic anxiety 10/13/2014   • Gastroesophageal reflux disease 10/13/2014   • Essential hypertension 10/13/2014      LOS (days): 1  Geometric Mean LOS (GMLOS) (days): 3 30  Days to GMLOS:2 7     OBJECTIVE:    Risk of Unplanned Readmission Score: 8 69         Current admission status: Inpatient  Referral Reason: VNA    Preferred Pharmacy:   [facility], [address] - [address]  [address] [place] [address]  Phone: [phone] Fax: [phone]    Primary Care Provider: Tahmina Aly DO    Primary Insurance: MEDICARE  Secondary Insurance: Darwin Achilles    ASSESSMENT:  Kristal Wright Proxies     Anant Velasco [facility] Representative - Daughter   Primary Phone: [phone] (Mobile)               Advance Directives  Does patient have a [address]?: No  Was patient offered paperwork?: Yes  Does patient currently have a Health Care decision maker?: Yes, please see Health Care Proxy section  Does patient have Advance Directives?: Yes  Advance Directives: Living will  Primary Contact: Kyree Lomeli         Readmission Root Cause  30 Day Readmission: No    Patient Information  Admitted from[de-identified] Home  Mental Status: Alert  During Assessment patient was accompanied by: Not accompanied during assessment  Assessment information provided by[de-identified] Daughter  Primary Caregiver: Self  Support Systems: Spouse/significant other  [place] of Residence: [address] [facility] do you live in?: [address] [facility] entry access options   Select all that apply : Stairs  Number of steps to enter home : 3  Do the steps have railings?: Yes  Type of Current Residence: [address] [place] [address] home  Upon entering residence, is there a bedroom on the main floor (no further steps)?: No  A bedroom is located on the following floor levels of residence (select all that apply):: 2nd Floor  Upon entering residence, is there a bathroom on the main floor (no further steps)?: Yes  Number of steps to 2nd floor from main floor: One Flight  In the last 12 months, was there a time when you were not able to pay the mortgage or rent on time?: No  In the last 12 months, how many places have you lived?: 1  In the last 12 months, was there a time when you did not have a steady place to sleep or slept in a shelter (including now)?: No  Homeless/housing insecurity resource given?: N/A  Living Arrangements: Lives w/ Spouse/significant other  Is patient a ?: No    Activities of Daily Living Prior to Admission  Functional Status: Assistance  Completes ADLs independently?: No  Level of ADL dependence: Assistance  Ambulates independently?: No  Level of ambulatory dependence: Assistance  Does patient use assisted devices?: Yes  Assisted Devices (DME) used: Authur Sever  Does patient currently own DME?: Yes  What DME does the patient currently own?: Efe Aroldoroeljonathan Zamora  Does patient have a history of Outpatient Therapy (PT/OT)?: Yes  Does the patient have a history of Short-Term Rehab?: Yes  Does patient have a history of HHC?: Yes  Does patient currently have [facility]?: No         Patient Information Continued  Income Source: Pension/halfway  Does patient have prescription coverage?: Yes  Within the past 12 months, you worried that your food would run out before you got the money to buy more : Never true  Within the past 12 months, the food you bought just didn't last and you didn't have money to get more : Never true  Food insecurity resource given?: N/A  Does patient receive dialysis treatments?: No  Does patient have a history of substance abuse?: No  Does patient have a history of Mental Health Diagnosis?: No         Means of Transportation  Means of Transport to Appts[de-identified] Family transport  In the past 12 months, has lack of transportation kept you from medical appointments or from getting medications?: No  In the past 12 months, has lack of transportation kept you from meetings, work, or from getting things needed for daily living?: No  Was application for public transport provided?: N/A        DISCHARGE DETAILS:    Discharge planning discussed with[de-identified] Patient dtr via phone and pt in room  Freedom of Choice: Yes  Comments - Freedom of Choice: discussed mobility at baseline and home needs - [facility] referral sent  CM contacted family/caregiver?: Yes  Were Treatment Team discharge recommendations reviewed with patient/caregiver?: Yes  Did patient/caregiver verbalize understanding of patient care needs?: Yes       Contacts  Patient Contacts: Diane Rose (dtr) and spouse Delroy Lay  Relationship to Patient[de-identified] Family  Contact Method: Phone  Phone Number: [phone] and [phone]  Reason/Outcome: Continuity of Care, Emergency Contact, Discharge [address]         Is the patient interested in [facility] at discharge?: Yes  [address] requested[de-identified] Physical Therapy, Occupational Therapy, Nursing, [place] Agency Name[de-identified] Other ([address] [place] [address])  [facility] Provider[de-identified] PCP  Home Health Services Needed[de-identified] Evaluate Functional Status and Safety, Gait/ADL Training, Strengthening/Theraputic Exercises to Improve Function, Other (comment) (Medication managment, Assistance with ADLS)  Homebound Criteria Met[de-identified] Requires the Assistance of Another Person for Safe Ambulation or to Leave the Home, Uses an Assist Device (i e  cane, walker, etc)  Supporting Clincal Findings[de-identified] Limited Endurance, Fatigues Easliy in [employer]    DME Referral Provided  Referral made for DME?: No    Other Referral/Resources/Interventions Provided:  Interventions: C  Referral Comments: Return home with [facility]         Treatment Team Recommendation: Home with [address]  Discharge Destination Plan[de-identified] Home with [place] at Discharge : Family                             IMM Given (Date):: 01/11/23  IMM Given to[de-identified] Family     Additional Comments: Cm spoke with pts dtr via phone  Dtr provided baseline info for pt mobility  Walks with a cane and walker at home  Has 3ste home in front and 7 dipti in back  Does not use cane or walker at all times when she should   Family is looking into waiver services  THey have applied and waiting to hear back  Pt spouse assists at home as much as he is able  PT to return home today with [facility]  Referrals sent out  [facility] is able to provide all requested services SN/PT/OT/HHA  Pt to dc home today   Spouse at bedside for transpoit home

## 2023-01-11 NOTE — ASSESSMENT & PLAN NOTE
· Last seen by neurology on 7/6/22 with increased bradykinesia, rigidity and gait difficulty with postural instability    · Encouraged to use walker and continued physical therapy  · Noted to have some cognitive issues intermittently on outpatient notes thought to be related to the parkinson's  · Oriented to person and place but not time in the ED 1/10/22  · PT/OT eval  · Sinemet 25/100 to 1 tab q9am, 1/2 tab at 1pm, 1 tab at 5pm and 1/2 tab at 9pm  · Follow up with outpatient Neurology for continued management

## 2023-01-11 NOTE — PLAN OF CARE
Problem: PHYSICAL THERAPY ADULT  Goal: Performs mobility at highest level of function for planned discharge setting  See evaluation for individualized goals  Description: Treatment/Interventions: Functional transfer training, LE strengthening/ROM, Therapeutic exercise, Endurance training, Cognitive reorientation, Patient/family training, Equipment eval/education, Bed mobility, Gait training, Spoke to nursing, OT  Equipment Recommended: Jw Warner       See flowsheet documentation for full assessment, interventions and recommendations  Note: Prognosis: Good  Problem List: Decreased endurance, Impaired balance, Decreased mobility, Decreased cognition, Pain  Assessment: Patient is a 67y/o F who presents after a fall  Found to have a fracture of the sternum  Patient also with hyponatremia  Patient resides with spouse in a home with a first floor setup  Per CM, patient has assistance at baseline for all mobility  She normally uses a SPC  Current medical status includes decreased cognition, slow processing, bed/chair alarm, fall risk, pain, decreased balance, endurance and mobility  Patient tolerated session well  She did have increased pain when weight bearing through the UE's for transfers  Patient ambulated further than a household distance  She required min A for CG for all mobility  Gait and balance much improved with use of a RW  Recommending use of RW at all times  She will benefit from ongoing support from family at home and would benefit from home P  T  The patient's AM-PAC Basic Mobility Inpatient Short Form Raw Score is 17  A Raw score of less than or equal to 17 suggests the patient may benefit from discharge to post-acute rehabilitation services  Please also refer to the recommendation of the Physical Therapist for safe discharge planning  Barriers to Discharge: None     PT Discharge Recommendation: Home with home health rehabilitation    See flowsheet documentation for full assessment

## 2023-01-11 NOTE — OCCUPATIONAL THERAPY NOTE
Occupational Therapy Evaluation (3056-1572) & Tx ([id number])     Patient Name: Amaya Warren  GWOQF'D Date: 2023  Problem List  Principal Problem:    Unspecified fracture of sternum, initial encounter for closed fracture  Active Problems:    Chronic constipation    Gastroesophageal reflux disease    Glaucoma    Essential hypertension    Parkinson disease ([facility] )    Hyperlipidemia    Hyponatremia    Past Medical History  Past Medical History:   Diagnosis Date    Constipation     Hyperlipidemia     Hypertension     Parkinson disease ([facility] )      Past Surgical History  Past Surgical History:   Procedure Laterality Date     SECTION      CHOLECYSTECTOMY      [place] HEMIARTHROPLASTY HIP PARTIAL Left 2021    Procedure: HEMIARTHROPLASTY HIP (BIPOLAR); Surgeon: Corey Parra MD;  Location:  MAIN OR;  Service: Orthopedics           23 1319   OT Last Visit   OT Visit Date 23   Note Type   Note type Evaluation   Pain Assessment   Pain Assessment Tool Wheeler-Baker FACES   Wheeler-Baker FACES Pain Rating 8   Pain Location/Orientation Other (Comment)  (Sternum)   Hospital Pain Intervention(s) Emotional support;Repositioned   Restrictions/Precautions   Weight Bearing Precautions Per Order Yes   RUE Weight Bearing Per Order WBAT   LUE Weight Bearing Per Order WBAT   Other Precautions Pain; Fall Risk;Bed Alarm;Cognitive; Chair Alarm   Home Living   Type of [address] [place] [address] Two level;1/2 bath on main level;Performs ADLs on one level   Home Equipment Cane;Walker  (Primarily cane)   Additional Comments Per nursing, dtr states pt sleeps in recliner on FF as pt does not go upstairs   Prior Function   Level of [place] Needs assistance with ADLs; Needs assistance with IADLS;Needs assistance with functional mobility   Lives With Spouse   Receives Help From Family   IADLs Family/Friend/Other provides transportation; Family/Friend/Other provides meals; Family/Friend/Other provides medication management Falls in the last 6 months 1 to 4   Comments Pt able to state her  assists with ADLs - per documentation pt receives A for ADLs at baseline   General   Additional Pertinent History Acute sternal fx - no sternal precautions - WBAT BUE   Family/Caregiver Present No   Subjective   Subjective "I'll miss you!"   ADL   Eating Assistance 7  Independent   Grooming Assistance 5  Supervision/Setup   UB Bathing Assistance 5  Supervision/Setup   LB Bathing Assistance 4  Minimal Assistance   UB Dressing Assistance 5  Supervision/Setup   LB Dressing Assistance 3  Moderate [address]  5  Supervision/Setup   Toileting Deficit Perineal hygiene; Bedside commode   Bed Mobility   Supine to Sit 3  Moderate assistance   Additional items Assist x 2;HOB elevated; Increased time required;LE management;Verbal cues  (Pt sleeps in recliner at baseline)   Transfers   Sit to Stand 4  Minimal assistance   Additional items Assist x 1; Armrests; Verbal cues; Increased time required  (CGA)   Stand to Sit 4  Minimal assistance   Additional items Assist x 1; Increased time required;Verbal cues;Armrests  (CGA)   Functional Mobility   Functional Mobility 4  Minimal assistance   Additional Comments Contact guard - functional household distance  Narrow NATALYA & mild scissoring   Additional items (S)  Rolling walker  (Attempted to use SPC - poor balance & reaching out to steady self  Balance significantly improved with use of RW   Recommend pt continue to use RW for all mobility)   Balance   Static Sitting Good   Dynamic Sitting Fair +   Static Standing Fair   Dynamic Standing Fair   Ambulatory Fair   Activity Tolerance   Activity Tolerance Patient limited by pain   Medical Staff Made Aware Co-treat with PT [place] due to medical complexity, RUPA Proctor made aware   Nurse Made Aware CHRIS Moran   RUE Assessment   RUE Assessment WFL   LUE Assessment   LUE Assessment WFL   Cognition   Overall Cognitive Status Impaired   Arousal/Participation Alert   Attention Attends with cues to redirect   Orientation Level Oriented to person;Disoriented to place; Disoriented to time;Disoriented to situation   Memory Decreased recall of precautions;Decreased recall of recent events;Decreased short term memory   Following Commands Follows one step commands with increased time or repetition   Assessment   Limitation Decreased ADL status; Decreased Safe judgement during ADL;Decreased cognition;Decreased self-care trans;Decreased high-level ADLs   Prognosis Good   Assessment Pt is a 76 y o  female seen for OT evaluation at [facility], admitted 1/10/2023 w/ Unspecified fracture of sternum, initial encounter for closed fracture s/p fall  Pt with WBAT to BUE orders  OT completed extensive review of pt's medical and social history  Comorbidities affecting pt's functional performance at time of assessment include: glaucoma, Parkinson's disease, HTN, GERD, hyponatremia, chronic constipation, cognitive deficit due to parkinson's, chronic pain syndrome  Personal factors affecting pt at time of IE include: steps to enter environment, limited home support, difficulty performing ADLS, difficulty performing IADLS , limited insight into deficits, compliance and environment  Prior to admission, pt was living with spouse in a [facility]  [address] 3 [address]IN[address] & a FF s/u  Pt was A w/  ADLS and A w/ IADLS, (-) drove, & required use of cane and RW PTA  Upon evaluation: Pt requires Mod Ax2 for bed mobility, Min A for CGA for functional mobility/transfers, S for UB ADLs and Mod A for LB ADLS 2* the following deficits impacting occupational performance: weakness, decreased balance, decreased tolerance, impaired attention, impaired memory, impaired problem solving, impulsivity, decreased safety awareness and increased pain  Full objective findings from OT assessment regarding body systems outlined above   Pt to benefit from continued skilled OT tx while in the hospital to address deficits as defined above and maximize level of functional independence w/ ADL's and functional mobility  Occupational Performance areas to address include: grooming, bathing/shower, toilet hygiene, dressing, functional mobility and clothing management  Based on findings, pt is of high complexity  The patient's raw score on the AM-PAC Daily Activity inpatient short form is 18, standardized score is 38 66, less than 39 4  Patients at this level are likely to benefit from DC to post-acute rehabilitation services, which does NOT coincide with current above OT recommendations  However, please refer to therapist recommendation for discharge planning given other factors that may influence destination  At this time, OT recommendations at time of discharge are home with home OT as pt receives assistance for ADLs & functional txfers/mobility at baseline  Goals   Patient Goals Pt did not verbalize goals related to therapy   Plan   Treatment Interventions ADL retraining;Functional transfer training; Endurance training;Cognitive reorientation;Patient/family training;Equipment evaluation/education;Continued evaluation   Goal Expiration Date 01/21/23   OT Treatment Day 0   OT Frequency 1-2x/wk   Recommendation   OT Discharge Recommendation Home with home health rehabilitation   [facility] Daily Activity Inpatient   Lower Body Dressing 2   Bathing 3   Toileting 3   Upper Body Dressing 3   Grooming 3   Eating 4   Daily Activity Raw Score 18   Daily Activity Standardized Score (Calc for Raw Score >=11) 38 66   [facility] Applied Cognition Inpatient   Following a Speech/Presentation 2   Understanding Ordinary Conversation 4   Taking Medications 3   Remembering Where Things Are Placed or Put Away 3   Remembering List of 4-5 Errands 2   Taking Care of Complicated Tasks 2   Applied Cognition Raw Score 16   Applied Cognition Standardized Score 35 03   Additional Treatment Session   Start Time 1310   End Time 1319   Treatment Assessment Pt endorsed need to urinate  Pt performed stand <> sit & stand pivot to & from [facility] all with CGA & VC's for hand placement  Pt states "I can wipe myself," & proceeded to perform frank-care with S while seated on BSC  Pt left seated in recliner with chair alarm on, SCDs, on all needs in reach  End of Consult   Education Provided Yes   Patient Position at End of Consult Bedside chair;Bed/Chair alarm activated; All needs within reach   Nurse Communication Nurse aware of consult     Pt will achieve the following goals within 10 days  *Pt will complete LB bathing and dressing with Min A & DME PRN  *Pt will perform functional transfers with on/off all surfaces with S using DME as needed w/ G balance/safety  *Pt will participate in UE therapeutic exercise in order to maximize strength for ADL transfers  *Pt will improve functional mobility during ADL/IADL/leisure tasks to S using DME as needed w/ G balance/safety  *Pt will orient self x 4 with minimal verbal cues to increase overall awareness and promote safety with ADL/IADL tasks       Kia Peterson, OTR/L

## 2023-01-11 NOTE — ASSESSMENT & PLAN NOTE
· Patient fell off toilet hitting left side of face and chest with no loss of consciousness  Not on blood thinners  · CT chest abdomen pelvis  · Acute sternal fracture  No substernal or epicardial hematoma  · CT head negative  · PT/OT - for home health  · Pain control - pt appears asymptomatic    · Case management

## 2023-01-11 NOTE — PHYSICAL THERAPY NOTE
PHYSICAL THERAPY EVAL/TX  Physical Therapy Evaluation    Performed at least 2 patient identifiers during session:  Patient Active Problem List   Diagnosis    Abdominal aortic atherosclerosis ([facility] [address] )    Ambulatory dysfunction    Anemia due to poor nutrition    Bicipital tendinitis of left shoulder    Cataract, left    Chronic anxiety    Chronic constipation    Gastroesophageal reflux disease    Glaucoma    Essential hypertension    Microcytic hypochromic anemia    Low back pain    Lumbosacral spinal stenosis    Osteopenia    Parkinson disease (HCC)    Lumbar spondylosis    Chronic pain syndrome    Disorder of intervertebral disc    Hyperlipidemia    Lumbar radiculopathy    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 ([facility] [address] )    Closed displaced fracture of left femoral neck (HCC)    Atrial flutter, unspecified type ([facility] [address] )    Unspecified fracture of sternum, initial encounter for closed fracture    Hyponatremia       Past Medical History:   Diagnosis Date    Constipation     Hyperlipidemia     Hypertension     Parkinson disease ([facility] [address] )        Past Surgical History:   Procedure Laterality Date     SECTION      CHOLECYSTECTOMY      CT HEMIARTHROPLASTY HIP PARTIAL Left 2021    Procedure: HEMIARTHROPLASTY HIP (BIPOLAR); Surgeon: Jaden Zimmerman MD;  Location:  MAIN OR;  Service: Orthopedics          23 1317   PT Last Visit   PT Visit Date 23   Note Type   Note type Evaluation   Pain Assessment   Pain Assessment Tool Wheeler-Baker FACES   Wheeler-Baker FACES Pain Rating 8   Pain Location/Orientation   (sternum)   Hospital Pain Intervention(s) Repositioned   Restrictions/Precautions   Weight Bearing Precautions Per Order Yes   RUE Weight Bearing Per Order WBAT  (Verbal per Dr Hay Cuadra)   LUE Weight Bearing Per Order WBAT  (Verbal per Dr Hay Cuadra)   Other Precautions Pain; Fall Risk;Bed Alarm; Chair Alarm;Cognitive   Home Living   Type of Home House   Home Layout Two level  (1st floor setup  Sleeps in recliner)   [address] [facility]   Prior Function   Level of [place] Needs assistance with ADLs; Needs assistance with functional mobility; Needs assistance with IADLS   Lives With Spouse   Receives Help From Family   IADLs Family/Friend/Other provides transportation; Family/Friend/Other provides meals; Family/Friend/Other provides medication management   Falls in the last 6 months 1 to 4   General   Additional Pertinent History Hx of Parkinsons   Family/Caregiver Present No   Cognition   Overall Cognitive Status Impaired   Arousal/Participation Alert   Orientation Level Oriented to person;Disoriented to place; Disoriented to time;Disoriented to situation   Memory Decreased recall of recent events;Decreased recall of precautions   Following Commands Follows one step commands with increased time or repetition   Subjective   Subjective "Are we getting up?"   RLE Assessment   RLE Assessment WFL   LLE Assessment   LLE Assessment WFL   Bed Mobility   Supine to Sit 3  Moderate assistance   Additional items Assist x 2;HOB elevated; Bedrails; Increased time required;Verbal cues   Transfers   Sit to Stand 4  Minimal assistance  (Min A for CG)   Additional items Assist x 1; Armrests; Increased time required   Stand to Sit 4  Minimal assistance  (Min A for CG)   Additional items Assist x 1; Armrests; Verbal cues   Ambulation/Elevation   Gait pattern Narrow NATALYA  (scissoring)   Gait Assistance 4  Minimal assist   Additional items Assist x 1;Verbal cues   Assistive Device Rolling walker;SPC   Distance 25ft, 150ft  (25ft with SPC, 150ft with RW)   Ambulation/Elevation Additional Comments Initially requiring mod A for amb with SPC  Improved with RW-min A for CG   no LOB   Balance   Static Sitting Good   Dynamic Sitting Fair +   Static Standing Fair   Dynamic Standing Fair   Ambulatory Fair   Endurance Deficit   Endurance Deficit Yes   Activity Tolerance   Activity Tolerance Patient limited by pain   Medical Staff Made Aware Co-treat with Jalyn MCKENNA due to medical complexity   Nurse Made Aware RNSussy   Assessment   Prognosis Good   Problem List Decreased endurance; Impaired balance;Decreased mobility; Decreased cognition;Pain   Assessment Patient is a 67y/o F who presents after a fall  Found to have a fracture of the sternum  Patient also with hyponatremia  Patient resides with spouse in a home with a first floor setup  Per CM, patient has assistance at baseline for all mobility  She normally uses a SPC  Current medical status includes decreased cognition, slow processing, bed/chair alarm, fall risk, pain, decreased balance, endurance and mobility  Patient tolerated session well  She did have increased pain when weight bearing through the UE's for transfers  Patient ambulated further than a household distance  She required min A for CG for all mobility  Gait and balance much improved with use of a RW  Recommending use of RW at all times  She will benefit from ongoing support from family at home and would benefit from home P  T  The patient's AM-PAC Basic Mobility Inpatient Short Form Raw Score is 17  A Raw score of less than or equal to 17 suggests the patient may benefit from discharge to post-acute rehabilitation services  Please also refer to the recommendation of the Physical Therapist for safe discharge planning  Barriers to Discharge None   Goals   Patient Goals None stated   STG Expiration Date 01/25/23   Short Term Goal #1 1  Perform sit<>stand transfer supervision level 2  Ambulate 200ft with a RW supervision level   PT Treatment Day 1   Plan   Treatment/Interventions Functional transfer training;LE strengthening/ROM; Therapeutic exercise; Endurance training;Cognitive reorientation;Patient/family training;Equipment eval/education; Bed mobility;Gait training;Spoke to nursing;OT   PT Frequency 3-5x/wk   Recommendation   PT Discharge Recommendation Home with home health rehabilitation   Equipment Recommended [facility] Recommended Wheeled walker   AM-PAC Basic Mobility Inpatient   Turning in Flat Bed Without Bedrails 3   Lying on Back to Sitting on Edge of Flat Bed Without Bedrails 2   Moving Bed to Chair 3   Standing Up From Chair Using Arms 3   Walk in Room 3   Climb 3-5 Stairs With Railing 3   Basic Mobility Inpatient Raw Score 17   Basic Mobility Standardized Score 39 67   Highest Level Of Mobility   JH-HLM Goal 5: Stand one or more mins   JH-HLM Achieved 7: Walk 25 feet or more   Additional Treatment Session   Start Time 1305   End Time 1317   Treatment Assessment Performed additional sit<>stand transfer with min A x 1  Patient ambulated 3ftx 2 to/from commode with RW and min A  Narrow base of support  Verbal cues for sequencing and assistance with moving RW  Equipment Use RW   End of Consult   Patient Position at End of Consult Bedside chair;Bed/Chair alarm activated; All needs within reach  (Le's elevated )     Katy Pate, PT             Patient Name: Trista Jiménez  JOSEGLENN Date: 1/11/2023

## 2023-01-11 NOTE — H&P
Zuhair Villela  H&P- Gabo Rebollar 1945, 76 y o  female MRN: [id number]  Unit/Bed#: -01 Encounter: [id number]  Primary Care Provider: Rachel Hawkins DO   Date and time admitted to hospital: 1/10/2023  9:45 PM    *Attempted to call patients daughter to go over home medications and code status  VM left  Medications ordered on patient's epic list  Recent visit with [facility]'s PCP  * Unspecified fracture of sternum, initial encounter for closed fracture  Assessment & Plan  · Patient fell off toilet hitting left side of face and chest with no loss of consciousness  Not on blood thinners  · CT chest abdomen pelvis  · Acute sternal fracture  No substernal or epicardial hematoma  · CT head negative  · PT/OT eval  · Pain control  · Case management    Parkinson disease [facility])  Assessment & Plan  · Last seen by neurology on 7/6/22 with increased bradykinesia, rigidity and gait difficulty with postural instability    · Encouraged to use walker and continued physical therapy  · Noted to have some cognitive issues intermittently on outpatient notes thought to be related to the parkinson's  · Oriented to person and place but not time in the ED 1/10/22  · PT/OT eval  · Sinemet 25/100 to 1 tab q9am, 1/2 tab at 1pm, 1 tab at 5pm and 1/2 tab at 9pm  · Follow up with outpatient Neurology for continued management    Hyponatremia  Assessment & Plan  · Labs in ED: 129  · Baseline: 136  · Monitor BMP  · Encourage oral intake    Essential hypertension  Assessment & Plan  · Home med: Norvasc 5 mg and Lopressor 50 mg daily    Hyperlipidemia  Assessment & Plan  · Home med: Lipitor 10 mg daily    Gastroesophageal reflux disease  Assessment & Plan  · Home med: esomeprazole (NexIUM) 20 mg daily    Glaucoma  Assessment & Plan  · Home med: latanoprost (XALATAN) 0 005 % ophthalmic solution, Alphagan P 0 1 %, timolol (BETIMOL) 0 5 % ophthalmic solution    Chronic constipation  Assessment & Plan  · Home med: docusate sodium (COLACE) 100 mg BID    VTE Pharmacologic Prophylaxis: VTE Score: 3 Moderate Risk (Score 3-4) - Pharmacological DVT Prophylaxis Ordered: heparin  Code Status: Level 1 - Full Code   Discussion with family: Attempted to update  (daughter) via phone  Left voicemail  Anticipated Length of Stay: Patient will be admitted on an inpatient basis with an anticipated length of stay of greater than 2 midnights secondary to fall  Total Time for Visit, including Counseling / Coordination of Care: 60 minutes Greater than 50% of this total time spent on direct patient counseling and coordination of care  Chief Complaint: fall    History of Present Illness: Libby Schaffer is a 76 y o  female with a PMH of HTN, parkinsons, GERD, HLD,  who presents from home after falling off of the toilet when trying to get back up on her feet  Patient hit the left side of her face and chest   Did not have loss of consciousness  Does not take blood thinners at home  Patient scanned in the ER due to trauma  Found to have acute sternal fracture  Patient reporting moderate amount of pain around the area  Denies headache, change in vision, abdominal pain, pain in her arms or legs  Typically uses a cane to ambulate  History of ambulation issues due to history of Parkinson's  During exam patient appears overwhelmed and confused at times  Currently oriented to person and place but not time  Review of Systems:  Review of Systems   Constitutional: Negative for fatigue and fever  HENT: Negative for sore throat  Respiratory: Negative for cough, chest tightness and shortness of breath  Cardiovascular: Negative for chest pain  Gastrointestinal: Negative for abdominal distention, abdominal pain, diarrhea, nausea and vomiting  Genitourinary: Negative for difficulty urinating  Musculoskeletal: Negative for arthralgias          Chest wall hurts over sternum   Neurological: Negative for weakness and headaches  Psychiatric/Behavioral: Negative for agitation and behavioral problems  The patient is nervous/anxious  All other systems reviewed and are negative  Past Medical and Surgical History:   Past Medical History:   Diagnosis Date   • Constipation    • Hyperlipidemia    • Hypertension    • Parkinson disease ([address] )        Past Surgical History:   Procedure Laterality Date   •  SECTION     • CHOLECYSTECTOMY     • MT HEMIARTHROPLASTY HIP PARTIAL Left 2021    Procedure: HEMIARTHROPLASTY HIP (BIPOLAR); Surgeon: Ab Moreno MD;  Location:  MAIN OR;  Service: Orthopedics       Meds/Allergies:  Prior to Admission medications    Medication Sig Start Date End Date Taking?  Authorizing Provider   acetaminophen (TYLENOL) 325 mg tablet Take 3 tablets (975 mg total) by mouth every 8 (eight) hours 21   Addi Sheppard PA-C   Alphagan P 0 1 % INSTILL 1 DROP INTO EACH EYE TWICE DAILY 21   Historical Provider, MD   amLODIPine (NORVASC) 5 mg tablet Take 1 tablet (5 mg total) by mouth daily 22   Deejay Reyes,    aspirin (ECOTRIN) 325 mg EC tablet Take 325 mg by mouth every 6 (six) hours as needed for mild pain    Historical Provider, MD   atorvastatin (LIPITOR) 10 mg tablet Take one tab at night 22   Deejay Reyes,    carbidopa-levodopa (Sinemet)  mg per tablet 1 tab at 9am, and 5pm and 1/2 tab 1 p, and 9pm 22   Chin Del Toro MD   docusate sodium (COLACE) 100 mg capsule Take 1 capsule (100 mg total) by mouth 2 (two) times a day 21   Addi Sheppard PA-C   esomeprazole (NexIUM) 20 mg capsule Take 20 mg by mouth every morning before breakfast    Historical Provider, MD   latanoprost (XALATAN) 0 005 % ophthalmic solution 1 drop daily at bedtime    Historical Provider, MD   metoprolol tartrate (LOPRESSOR) 50 mg tablet Take 1 tablet (50 mg total) by mouth daily 22   Deejay Reyes,    polyethylene glycol (MIRALAX) 17 g packet Take 17 g by mouth daily as needed (constipation) for up to 14 days 8/23/21 11/10/22  Jono Medina DO   selegiline (ELDEPRYL) 5 mg capsule Take 1 capsule (5 mg total) by mouth 2 (two) times a day before breakfast and lunch 22   Geetha Wheeler MD   timolol (BETIMOL) 0 5 % ophthalmic solution Administer 1 drop to both eyes daily     Historical Provider, MD   donepezil (ARICEPT) 5 mg tablet Take 1 tablet (5 mg total) by mouth daily at bedtime 20  Rajendra Rosario MD     I have been unable to obtain / verify an up to date medication list despite all reasonable attempts  Allergies: No Known Allergies    Social History:  Marital Status: /Civil Union   Occupation: Retired  Patient Pre-hospital Living Situation: Home  Patient Pre-hospital Level of Mobility: walks with cane  Patient Pre-hospital Diet Restrictions: Regular  Substance Use History:   Social History     Substance and Sexual Activity   Alcohol Use Yes    Comment: Socially     Social History     Tobacco Use   Smoking Status Former   • Packs/day: 0 25   • Types: Cigarettes   • Start date:    • Quit date: 2011   • Years since quittin 4   Smokeless Tobacco Never     Social History     Substance and Sexual Activity   Drug Use No       Family History:  Family History   Problem Relation Age of Onset   • Tremor Neg Hx    • Parkinsonism Neg Hx        Physical Exam:     Vitals:   Blood Pressure: (!) 176/78 (23)  Pulse: 77 (23)  Temperature: 97 6 °F (36 4 °C) (23)  Temp Source: Oral (23)  Respirations: 17 (23)  Height: 4' 11" (149 9 cm) (01/10/23 2150)  Weight - Scale: 50 8 kg (112 lb) (01/10/23 2150)  SpO2: 94 % (23 0000)    Physical Exam  Vitals and nursing note reviewed  Constitutional:       Appearance: Normal appearance  HENT:      Head: Normocephalic  Eyes:      Extraocular Movements: Extraocular movements intact  Pupils: Pupils are equal, round, and reactive to light  Cardiovascular:      Rate and Rhythm: Normal rate and regular rhythm  Heart sounds: No murmur heard  Pulmonary:      Effort: Pulmonary effort is normal  No respiratory distress  Breath sounds: Normal breath sounds  No wheezing  Abdominal:      General: Bowel sounds are normal  There is no distension  Tenderness: There is no abdominal tenderness  There is no guarding  Musculoskeletal:         General: Tenderness (over sternum) present  Normal range of motion  Cervical back: Normal range of motion  Right lower leg: No edema  Left lower leg: No edema  Skin:     General: Skin is warm  Findings: Bruising present  Comments: Abrasion noted on left side of face   Neurological:      General: No focal deficit present  Mental Status: She is alert  Comments: Oriented to person and place but not time   Psychiatric:         Mood and Affect: Mood is anxious  Behavior: Behavior normal          Thought Content:  Thought content normal       Comments: Appears overwhelmed          Additional Data:     Lab Results:  Results from last 7 days   Lab Units 01/10/23  2205   WBC Thousand/uL 9 56   HEMOGLOBIN g/dL 11 3*   HEMATOCRIT % 36 5   PLATELETS Thousands/uL 271   NEUTROS PCT % 77*   LYMPHS PCT % 15   MONOS PCT % 7   EOS PCT % 0     Results from last 7 days   Lab Units 01/10/23  2303   SODIUM mmol/L 129*   POTASSIUM mmol/L 3 9   CHLORIDE mmol/L 99   CO2 mmol/L 26   BUN mg/dL 13   CREATININE mg/dL 0 82   ANION GAP mmol/L 4   CALCIUM mg/dL 8 7   GLUCOSE RANDOM mg/dL 106                       Lines/Drains:  Invasive Devices     Peripheral Intravenous Line  Duration           Peripheral IV 01/10/23 Right Antecubital <1 day          Drain  Duration           External Urinary Catheter 426 days                    Imaging: Reviewed radiology reports from this admission including: chest CT scan, abdominal/pelvic CT, CT head and ct spine  TRAUMA - CT head wo contrast   Final Result by Samantha Loo MD (01/10 2235)      No acute intracranial abnormality  Workstation performed: [id number]         TRAUMA - CT spine cervical wo contrast   Final Result by Samantha Loo MD (01/10 2238)      No acute cervical spine fracture or traumatic malalignment  Workstation performed: [id number]         TRAUMA - CT chest abdomen pelvis w contrast   Final Result by Samantha Loo MD (01/10 2258)         1  Acute sternal fracture  No substernal or epicardial hematoma  2   No acute trauma in the abdomen or pelvis  3   Constipation  Workstation performed: [id number]         TRAUMA - CT facial bones wo contrast   Final Result by Samantha Loo MD (01/10 2242)      No evidence of acute maxillofacial fracture  Workstation performed: [id number]         XR Trauma chest portable   ED Interpretation by Ray Leal DO (01/10 2204)   My X-ray interpretation of the Chest: was negative for infiltrate, effusion, pneumothorax, or wide mediastinum          EKG and Other Studies Reviewed on Admission:   · EKG: NSR  HR 70     ** Please Note: This note has been constructed using a voice recognition system   **

## 2023-01-11 NOTE — ASSESSMENT & PLAN NOTE
· Patient fell off toilet hitting left side of face and chest with no loss of consciousness  Not on blood thinners  · CT chest abdomen pelvis  · Acute sternal fracture  No substernal or epicardial hematoma    · CT head negative  · PT/OT eval  · Pain control  · Case management

## 2023-01-11 NOTE — ED PROVIDER NOTES
Emergency Department Trauma Note  Trista Jiménez 76 y o  female MRN: [id number]  Unit/Bed#: ED 05/ED 05 Encounter: [id number]      Trauma Alert: Trauma Acuity: Trauma Evaluation  Model of Arrival:   via    Trauma Team: Current Providers  Attending Provider: Asuncion Franco DO  Attending Provider: Kristen Reyes MD  Registered Nurse: Aury Vaughn, RN  Advanced Practitioner: Madiha De La Fuente PA-C  Consultants:     None      History of Present Illness     Chief Complaint:   Chief Complaint   Patient presents with   • Fall     Pt fell off toilet around , hit left side of face on wall, denies LOC  Denies thinners  Reports sternal pain and pain under left breast      HPI:  Trista Jiménez is a 76 y o  female who presents with left facial chest pain after a fall from the toilet around 830  There is no reported loss of consciousness  The patient is complaining of pain under her left breast which is worse with deep inspiration     Mechanism:Details of Incident: fall, getting off toilet to stand and fell, hit face on wall Injury Date: 01/10/23 Injury Time:       HPI  Review of Systems    Historical Information     Immunizations:   Immunization History   Administered Date(s) Administered   • COVID-19 PFIZER VACCINE 0 3 ML IM 2021, 2021, 12/10/2021   • Influenza, high dose seasonal 0 7 mL 2019       Past Medical History:   Diagnosis Date   • Constipation    • Hyperlipidemia    • Hypertension    • Parkinson disease ([facility] [address] )        Family History   Problem Relation Age of Onset   • Tremor Neg Hx    • Parkinsonism Neg Hx      Past Surgical History:   Procedure Laterality Date   •  SECTION     • CHOLECYSTECTOMY     • OK HEMIARTHROPLASTY HIP PARTIAL Left 2021    Procedure: HEMIARTHROPLASTY HIP (BIPOLAR);   Surgeon: Carrington Antunez MD;  Location:  MAIN OR;  Service: Orthopedics     Social History     Tobacco Use   • Smoking status: Former     Packs/day: 0 25     Types: Cigarettes Start date: 63     Quit date: 2011     Years since quittin 4   • Smokeless tobacco: Never   Vaping Use   • Vaping Use: Never used   Substance Use Topics   • Alcohol use: Yes     Comment: Socially   • Drug use: No     E-Cigarette/Vaping   • E-Cigarette Use Never User      E-Cigarette/Vaping Substances   • Nicotine No    • THC No    • CBD No    • Flavoring No    • Other No    • Unknown No        Family History: non-contributory    Meds/Allergies   Prior to Admission Medications   Prescriptions Last Dose Informant Patient Reported? Taking?    Alphagan P 0 1 %   Yes No   Sig: INSTILL 1 DROP INTO EACH EYE TWICE DAILY   acetaminophen (TYLENOL) 325 mg tablet   No No   Sig: Take 3 tablets (975 mg total) by mouth every 8 (eight) hours   amLODIPine (NORVASC) 5 mg tablet   No No   Sig: Take 1 tablet (5 mg total) by mouth daily   aspirin (ECOTRIN) 325 mg EC tablet   Yes No   Sig: Take 325 mg by mouth every 6 (six) hours as needed for mild pain   atorvastatin (LIPITOR) 10 mg tablet   No No   Sig: Take one tab at night   carbidopa-levodopa (Sinemet)  mg per tablet   No No   Si tab at 9am, and 5pm and 1/2 tab 1 p, and 9pm   docusate sodium (COLACE) 100 mg capsule   No No   Sig: Take 1 capsule (100 mg total) by mouth 2 (two) times a day   esomeprazole (NexIUM) 20 mg capsule   Yes No   Sig: Take 20 mg by mouth every morning before breakfast   latanoprost (XALATAN) 0 005 % ophthalmic solution   Yes No   Si drop daily at bedtime   metoprolol tartrate (LOPRESSOR) 50 mg tablet   No No   Sig: Take 1 tablet (50 mg total) by mouth daily   polyethylene glycol (MIRALAX) 17 g packet   No No   Sig: Take 17 g by mouth daily as needed (constipation) for up to 14 days   selegiline (ELDEPRYL) 5 mg capsule   No No   Sig: Take 1 capsule (5 mg total) by mouth 2 (two) times a day before breakfast and lunch   timolol (BETIMOL) 0 5 % ophthalmic solution   Yes No   Sig: Administer 1 drop to both eyes daily Facility-Administered Medications: None       No Known Allergies    PHYSICAL EXAM    PE limited by: nothing    Objective   Vitals:   First set: Temperature: 97 6 °F (36 4 °C) (01/10/23 2150)  Pulse: 75 (01/10/23 2150)  Respirations: 20 (01/10/23 2150)  Blood Pressure: (!) 179/90 (01/10/23 2150)  SpO2: 97 % (01/10/23 2150)    Primary Survey:   (A) Airway: patent  (B) Breathing: CTA B/L  (C) Circulation: Pulses:   normal  (D) Disabliity:  GCS Total:  15  (E) Expose:  Completed    Secondary Survey: (Click on Physical Exam tab above)  Physical Exam  Vitals and nursing note reviewed  Constitutional:       General: She is not in acute distress  Appearance: She is well-developed  HENT:      Head: Normocephalic  Abrasion ( Left zygomatic region) and contusion present  No raccoon eyes or De La Fuente's sign  Right Ear: Tympanic membrane and external ear normal  No hemotympanum  Tympanic membrane is not perforated  Left Ear: Tympanic membrane and external ear normal  No hemotympanum  Tympanic membrane is not perforated  Eyes:      Conjunctiva/sclera: Conjunctivae normal       Pupils: Pupils are equal, round, and reactive to light  Cardiovascular:      Rate and Rhythm: Normal rate and regular rhythm  Heart sounds: Normal heart sounds  No murmur heard  Pulmonary:      Effort: Pulmonary effort is normal  No respiratory distress  Breath sounds: Normal breath sounds  Chest:      Chest wall: Tenderness present  Abdominal:      Palpations: Abdomen is soft  Tenderness: There is no abdominal tenderness  There is no guarding or rebound  Musculoskeletal:         General: No tenderness  Normal range of motion  Cervical back: Full passive range of motion without pain, normal range of motion and neck supple  No spinous process tenderness  Skin:     General: Skin is warm and dry  Neurological:      Mental Status: She is alert and oriented to person, place, and time        Cranial Nerves: No cranial nerve deficit  Motor: Tremor ( Resting) present  Cervical spine cleared by clinical criteria?  NO     Invasive Devices     Peripheral Intravenous Line  Duration           Peripheral IV 01/10/23 Right Antecubital <1 day          Drain  Duration           External Urinary Catheter 426 days                Lab Results:   Results Reviewed     Procedure Component Value Units Date/Time    Basic metabolic panel [[id number]]  (Abnormal) Collected: 01/10/23 2303    Lab Status: Final result Specimen: Blood from Arm, Right Updated: 01/10/23 2330     Sodium 129 mmol/L      Potassium 3 9 mmol/L      Chloride 99 mmol/L      CO2 26 mmol/L      ANION GAP 4 mmol/L      BUN 13 mg/dL      Creatinine 0 82 mg/dL      Glucose 106 mg/dL      Calcium 8 7 mg/dL      eGFR 68 ml/min/1 73sq m     Narrative:      [place] guidelines for Chronic Kidney Disease (CKD):   •  Stage 1 with normal or high GFR (GFR > 90 mL/min/1 73 square meters)  •  Stage 2 Mild CKD (GFR = 60-89 mL/min/1 73 square meters)  •  Stage 3A Moderate CKD (GFR = 45-59 mL/min/1 73 square meters)  •  Stage 3B Moderate CKD (GFR = 30-44 mL/min/1 73 square meters)  •  Stage 4 Severe CKD (GFR = 15-29 mL/min/1 73 square meters)  •  Stage 5 End Stage CKD (GFR <15 mL/min/1 73 square meters)  Note: GFR calculation is accurate only with a steady state creatinine    HS Troponin 0hr (reflex protocol) [[id number]]  (Normal) Collected: 01/10/23 2205    Lab Status: Final result Specimen: Blood from Arm, Right Updated: 01/10/23 2238     hs TnI 0hr 4 ng/L     HS Troponin I 2hr [[id number]]     Lab Status: No result Specimen: Blood     Basic metabolic panel [[id number]]  (Abnormal) Collected: 01/10/23 2205    Lab Status: Final result Specimen: Blood from Arm, Right Updated: 01/10/23 2227     Sodium 129 mmol/L      Potassium 5 9 mmol/L      Chloride 99 mmol/L      CO2 28 mmol/L      ANION GAP 2 mmol/L      BUN 13 mg/dL      Creatinine 0 83 mg/dL Glucose 106 mg/dL      Calcium 8 7 mg/dL      eGFR 67 ml/min/1 73sq m     Narrative:      [place] guidelines for Chronic Kidney Disease (CKD):   •  Stage 1 with normal or high GFR (GFR > 90 mL/min/1 73 square meters)  •  Stage 2 Mild CKD (GFR = 60-89 mL/min/1 73 square meters)  •  Stage 3A Moderate CKD (GFR = 45-59 mL/min/1 73 square meters)  •  Stage 3B Moderate CKD (GFR = 30-44 mL/min/1 73 square meters)  •  Stage 4 Severe CKD (GFR = 15-29 mL/min/1 73 square meters)  •  Stage 5 End Stage CKD (GFR <15 mL/min/1 73 square meters)  Note: GFR calculation is accurate only with a steady state creatinine    CBC and differential [[id number]]  (Abnormal) Collected: 01/10/23 2205    Lab Status: Final result Specimen: Blood from Arm, Right Updated: 01/10/23 2214     WBC 9 56 Thousand/uL      RBC 5 36 Million/uL      Hemoglobin 11 3 g/dL      Hematocrit 36 5 %      MCV 68 fL      MCH 21 1 pg      MCHC 31 0 g/dL      RDW 16 2 %      MPV 9 7 fL      Platelets 639 Thousands/uL      nRBC 0 /100 WBCs      Neutrophils Relative 77 %      Immat GRANS % 1 %      Lymphocytes Relative 15 %      Monocytes Relative 7 %      Eosinophils Relative 0 %      Basophils Relative 0 %      Neutrophils Absolute 7 35 Thousands/µL      Immature Grans Absolute 0 09 Thousand/uL      Lymphocytes Absolute 1 43 Thousands/µL      Monocytes Absolute 0 64 Thousand/µL      Eosinophils Absolute 0 04 Thousand/µL      Basophils Absolute 0 01 Thousands/µL                  Imaging Studies:   Direct to CT: Yes  TRAUMA - CT head wo contrast   Final Result by Abel Jackson MD (01/10 2235)      No acute intracranial abnormality  Workstation performed: [id number]         TRAUMA - CT spine cervical wo contrast   Final Result by Abel Jackson MD (01/10 2238)      No acute cervical spine fracture or traumatic malalignment                     Workstation performed: [id number]         TRAUMA - CT chest abdomen pelvis w contrast   Final Result by Sheldon Logan MD (01/10 8229)         1  Acute sternal fracture  No substernal or epicardial hematoma  2   No acute trauma in the abdomen or pelvis  3   Constipation  Workstation performed: [id number]         TRAUMA - CT facial bones wo contrast   Final Result by Sheldon Logan MD (01/10 2242)      No evidence of acute maxillofacial fracture  Workstation performed: [id number]         XR Trauma chest portable   ED Interpretation by Toan Rodriguez DO (01/10 2204)   My X-ray interpretation of the Chest: was negative for infiltrate, effusion, pneumothorax, or wide mediastinum            Procedures  ECG 12 Lead Documentation Only    Date/Time: 1/10/2023 10:07 PM  Performed by: Toan Rodriguez DO  Authorized by: Toan Rodriguez DO     Indications / Diagnosis:  Trauma  ECG reviewed by me, the ED Provider: yes    Patient location:  ED  Previous ECG:     Previous ECG:  Compared to current    Comparison ECG info:  11/8/21 - ectopy has resolved    Similarity:  No change  Interpretation:     Interpretation: abnormal    Rate:     ECG rate:  70    ECG rate assessment: normal    Rhythm:     Rhythm: sinus rhythm    Ectopy:     Ectopy: none    QRS:     QRS axis:  Normal    QRS intervals:  Normal  Conduction:     Conduction: abnormal      Abnormal conduction: 1st degree    ST segments:     ST segments:  Normal  T waves:     T waves: normal               ED Course  ED Course as of 01/11/23 0005   Tue [place] 10, 2023   2231 Potassium(!): 5 9  Specimen slightly hemolyzed, will repeat BMP   2329 Isolated sternal fracture noted  Plan for pain control and admission  Discussed case with trauma attending who agrees that patient may stay at [facility]  I then discussed the case with Ricardo Johnson from [facility] for admission   2336 Potassium normal on repeat BMP  The patient's sodium still remains mildly decreased at 129             Medical Decision Making  The plan is to obtain a CT of the head and cervical spine to rule out clinically significant injury such as skull fracture, epidural or subdural hematoma  Will also rule out cervical spine fracture or dislocation  Also obtain a CT of the chest abdomen pelvis to rule out rib or sternal fracture, pulmonary contusion, solid organ injury  Closed fracture of sternum, unspecified portion of sternum, initial encounter: acute illness or injury  Fall from toilet seat, initial encounter: acute illness or injury  Hyponatremia: acute illness or injury  Parkinson's disease ([place] [address] ): acute illness or injury  Amount and/or Complexity of Data Reviewed  Labs: ordered  Decision-making details documented in ED Course  Radiology: ordered and independent interpretation performed  Risk  Prescription drug management  Decision regarding hospitalization                    Disposition  [facility] Transfer: No  Final diagnoses:   Closed fracture of sternum, unspecified portion of sternum, initial encounter   Fall from toilet seat, initial encounter   Parkinson's disease ([facility] )   Hyponatremia   Facial abrasion, initial encounter     Time reflects when diagnosis was documented in both MDM as applicable and the Disposition within this note     Time User Action Codes Description Comment    1/10/2023 11:19 PM Delia Gamboa B Add [S22 20XA] Closed fracture of sternum, unspecified portion of sternum, initial encounter     1/10/2023 11:20 PM Juan Negrete Add Isadore Brake Fall from toilet seat, initial encounter     1/10/2023 11:20 PM Vincenzo Guerra Parkinson's disease ([facility] )     1/10/2023 11:32 PM Juan Negrete Add [E87 1] Hyponatremia     1/10/2023 11:38 PM Juan Negrete Add [S00 81XA] Facial abrasion, initial encounter       ED Disposition     ED Disposition   Admit    Condition   Stable    Date/Time   Tue [place] 10, 2023 11:31 PM    Comment   Case was discussed with Ashley Villagomez and the patient's admission status was agreed to be Admission Status: inpatient status to the service of Dr Sal Simpson   Follow-up Information    None       Patient's Medications   Discharge Prescriptions    No medications on file     No discharge procedures on file      PDMP Review       Value Time User    PDMP Reviewed  Yes 7/11/2021 10:53 AM Lyla Sweeney MD          ED Provider  Electronically Signed by         Stoney Luke DO  01/11/23 0005

## 2023-01-11 NOTE — NURSING NOTE
RN attempted PTA med list  Pt states she "does not even know what I'd tell you they'd be", as in she does not know what medications she takes since they are "all at home"

## 2023-01-11 NOTE — PLAN OF CARE
Problem: PAIN - ADULT  Goal: Verbalizes/displays adequate comfort level or baseline comfort level  Description: Interventions:  - Encourage patient to monitor pain and request assistance  - Assess pain using appropriate pain scale  - Administer analgesics based on type and severity of pain and evaluate response  - Implement non-pharmacological measures as appropriate and evaluate response  - Consider cultural and social influences on pain and pain management  - Notify physician/advanced practitioner if interventions unsuccessful or patient reports new pain  Outcome: Progressing     Problem: INFECTION - ADULT  Goal: Absence or prevention of progression during hospitalization  Description: INTERVENTIONS:  - Assess and monitor for signs and symptoms of infection  - Monitor lab/diagnostic results  - Monitor all insertion sites, i e  indwelling lines, tubes, and drains  - Monitor endotracheal if appropriate and nasal secretions for changes in amount and color  - [place] appropriate cooling/warming therapies per order  - Administer medications as ordered  - Instruct and encourage patient and family to use good hand hygiene technique  - Identify and instruct in appropriate isolation precautions for identified infection/condition  Outcome: Progressing  Goal: Absence of fever/infection during neutropenic period  Description: INTERVENTIONS:  - Monitor WBC    Outcome: Progressing     Problem: SAFETY ADULT  Goal: Patient will remain free of falls  Description: INTERVENTIONS:  - Educate patient/family on patient safety including physical limitations  - Instruct patient to call for assistance with activity   - Consult OT/PT to assist with strengthening/mobility   - Keep Call bell within reach  - Keep bed low and locked with side rails adjusted as appropriate  - Keep care items and personal belongings within reach  - Initiate and maintain comfort rounds  - Make Fall Risk Sign visible to staff  - Offer Toileting every 2 Hours, in advance of need  - Initiate/Maintain alarm  - Obtain necessary fall risk management equipment  - Apply yellow socks and bracelet for high fall risk patients  - Consider moving patient to room near nurses station  Outcome: Progressing  Goal: Maintain or return to baseline ADL function  Description: INTERVENTIONS:  -  Assess patient's ability to carry out ADLs; assess patient's baseline for ADL function and identify physical deficits which impact ability to perform ADLs (bathing, care of mouth/teeth, toileting, grooming, dressing, etc )  - Assess/evaluate cause of self-care deficits   - Assess range of motion  - Assess patient's mobility; develop plan if impaired  - Assess patient's need for assistive devices and provide as appropriate  - Encourage maximum independence but intervene and supervise when necessary  - Involve family in performance of ADLs  - Assess for home care needs following discharge   - Consider OT consult to assist with ADL evaluation and planning for discharge  - Provide patient education as appropriate  Outcome: Progressing  Goal: Maintains/Returns to pre admission functional level  Description: INTERVENTIONS:  - Perform BMAT or MOVE assessment daily    - Set and communicate daily mobility goal to care team and patient/family/caregiver  - Collaborate with rehabilitation services on mobility goals if consulted  - Perform Range of Motion 4 times a day  - Reposition patient every 2 hours    - Dangle patient 4 times a day  - Stand patient 4 times a day  - Ambulate patient 4 times a day  - Out of bed to chair 4 times a day   - Out of bed for meals 3 times a day  - Out of bed for toileting  - Record patient progress and toleration of activity level   Outcome: Progressing     Problem: DISCHARGE PLANNING  Goal: Discharge to home or other facility with appropriate resources  Description: INTERVENTIONS:  - Identify barriers to discharge w/patient and caregiver  - Arrange for needed discharge resources and transportation as appropriate  - Identify discharge learning needs (meds, wound care, etc )  - Arrange for interpretive services to assist at discharge as needed  - Refer to Case Management Department for coordinating discharge planning if the patient needs post-hospital services based on physician/advanced practitioner order or complex needs related to functional status, cognitive ability, or social support system  Outcome: Progressing     Problem: Knowledge Deficit  Goal: Patient/family/caregiver demonstrates understanding of disease process, treatment plan, medications, and discharge instructions  Description: Complete learning assessment and assess knowledge base    Interventions:  - Provide teaching at level of understanding  - Provide teaching via preferred learning methods  Outcome: Progressing     Problem: NEUROSENSORY - ADULT  Goal: Achieves stable or improved neurological status  Description: INTERVENTIONS  - Monitor and report changes in neurological status  - Monitor vital signs such as temperature, blood pressure, glucose, and any other labs ordered   - Initiate measures to prevent increased intracranial pressure  - Monitor for seizure activity and implement precautions if appropriate      Outcome: Progressing     Problem: SKIN/TISSUE INTEGRITY - ADULT  Goal: Skin Integrity remains intact(Skin Breakdown Prevention)  Description: Assess:  -Perform Daniel assessment  -Clean and moisturize skin  -Inspect skin when repositioning, toileting, and assisting with ADLS  -Assess under medical devices   -Assess extremities for adequate circulation and sensation     Bed Management:  -Have minimal linens on bed & keep smooth, unwrinkled  -Change linens as needed when moist or perspiring  -Avoid sitting or lying in one position for more than 2 hours while in bed  -Keep HOB at 30 degrees     Toileting:  -Offer bedside commode  -Assess for incontinence  -Use incontinent care products after each incontinent episode    Activity:  -Mobilize patient 4 times a day  -Encourage activity and walks on unit  -Encourage or provide ROM exercises   -Turn and reposition patient every 2 Hours  -Use appropriate equipment to lift or move patient in bed  -Instruct/ Assist with weight shifting when out of bed in chair  -Consider limitation of chair time 4 hour intervals    Skin Care:  -Avoid use of baby powder, tape, friction and shearing, hot water or constrictive clothing  -Relieve pressure over bony prominences  -Do not massage red bony areas    Next Steps:  -Teach patient strategies to minimize risks   -Consider consults to  interdisciplinary teams  Outcome: Progressing     Problem: MUSCULOSKELETAL - ADULT  Goal: Maintain or return mobility to safest level of function  Description: INTERVENTIONS:  - Assess patient's ability to carry out ADLs; assess patient's baseline for ADL function and identify physical deficits which impact ability to perform ADLs (bathing, care of mouth/teeth, toileting, grooming, dressing, etc )  - Assess/evaluate cause of self-care deficits   - Assess range of motion  - Assess patient's mobility  - Assess patient's need for assistive devices and provide as appropriate  - Encourage maximum independence but intervene and supervise when necessary  - Involve family in performance of ADLs  - Assess for home care needs following discharge   - Consider OT consult to assist with ADL evaluation and planning for discharge  - Provide patient education as appropriate  Outcome: Progressing

## 2023-01-11 NOTE — PLAN OF CARE
Problem: OCCUPATIONAL THERAPY ADULT  Goal: Performs self-care activities at highest level of function for planned discharge setting  See evaluation for individualized goals  Description: Treatment Interventions: ADL retraining, Functional transfer training, Endurance training, Cognitive reorientation, Patient/family training, Equipment evaluation/education, Continued evaluation          See flowsheet documentation for full assessment, interventions and recommendations  Note: Limitation: Decreased ADL status, Decreased Safe judgement during ADL, Decreased cognition, Decreased self-care trans, Decreased high-level ADLs  Prognosis: Good  Assessment: Pt is a 76 y o  female seen for OT evaluation at [address] [place] [address], admitted 1/10/2023 w/ Unspecified fracture of sternum, initial encounter for closed fracture s/p fall  Pt with WBAT to BUE orders  OT completed extensive review of pt's medical and social history  Comorbidities affecting pt's functional performance at time of assessment include: glaucoma, Parkinson's disease, HTN, GERD, hyponatremia, chronic constipation, cognitive deficit due to parkinson's, chronic pain syndrome  Personal factors affecting pt at time of IE include: steps to enter environment, limited home support, difficulty performing ADLS, difficulty performing IADLS , limited insight into deficits, compliance and environment  Prior to admission, pt was living with spouse in a [facility] ER with 3 DESTINY & a FF s/u  Pt was A w/  ADLS and A w/ IADLS, (-) drove, & required use of cane and RW PTA  Upon evaluation: Pt requires Mod Ax2 for bed mobility, Min A for CGA for functional mobility/transfers, S for UB ADLs and Mod A for LB ADLS 2* the following deficits impacting occupational performance: weakness, decreased balance, decreased tolerance, impaired attention, impaired memory, impaired problem solving, impulsivity, decreased safety awareness and increased pain   Full objective findings from OT assessment regarding body systems outlined above  Pt to benefit from continued skilled OT tx while in the hospital to address deficits as defined above and maximize level of functional independence w/ ADL's and functional mobility  Occupational Performance areas to address include: grooming, bathing/shower, toilet hygiene, dressing, functional mobility and clothing management  Based on findings, pt is of high complexity  The patient's raw score on the AM-PAC Daily Activity inpatient short form is 18, standardized score is 38 66, less than 39 4  Patients at this level are likely to benefit from DC to post-acute rehabilitation services, which does NOT coincide with current above OT recommendations  However, please refer to therapist recommendation for discharge planning given other factors that may influence destination  At this time, OT recommendations at time of discharge are home with home OT as pt receives assistance for ADLs & functional txfers/mobility at baseline       OT Discharge Recommendation: Home with home health rehabilitation

## 2023-01-11 NOTE — PLAN OF CARE
Problem: PAIN - ADULT  Goal: Verbalizes/displays adequate comfort level or baseline comfort level  Description: Interventions:  - Encourage patient to monitor pain and request assistance  - Assess pain using appropriate pain scale  - Administer analgesics based on type and severity of pain and evaluate response  - Implement non-pharmacological measures as appropriate and evaluate response  - Consider cultural and social influences on pain and pain management  - Notify physician/advanced practitioner if interventions unsuccessful or patient reports new pain  Outcome: Progressing     Problem: INFECTION - ADULT  Goal: Absence or prevention of progression during hospitalization  Description: INTERVENTIONS:  - Assess and monitor for signs and symptoms of infection  - Monitor lab/diagnostic results  - Monitor all insertion sites, i e  indwelling lines, tubes, and drains  - Monitor endotracheal if appropriate and nasal secretions for changes in amount and color  - Tyrone appropriate cooling/warming therapies per order  - Administer medications as ordered  - Instruct and encourage patient and family to use good hand hygiene technique  - Identify and instruct in appropriate isolation precautions for identified infection/condition  Outcome: Progressing  Goal: Absence of fever/infection during neutropenic period  Description: INTERVENTIONS:  - Monitor WBC    Outcome: Progressing     Problem: SAFETY ADULT  Goal: Patient will remain free of falls  Description: INTERVENTIONS:  - Educate patient/family on patient safety including physical limitations  - Instruct patient to call for assistance with activity   - Consult OT/PT to assist with strengthening/mobility   - Keep Call bell within reach  - Keep bed low and locked with side rails adjusted as appropriate  - Keep care items and personal belongings within reach  - Initiate and maintain comfort rounds  - Make Fall Risk Sign visible to staff  - Offer Toileting every 2 Hours, in advance of need  - Initiate/Maintain alarm  - Obtain necessary fall risk management equipment:   - Apply yellow socks and bracelet for high fall risk patients  - Consider moving patient to room near nurses station  Outcome: Progressing  Goal: Maintain or return to baseline ADL function  Description: INTERVENTIONS:  -  Assess patient's ability to carry out ADLs; assess patient's baseline for ADL function and identify physical deficits which impact ability to perform ADLs (bathing, care of mouth/teeth, toileting, grooming, dressing, etc )  - Assess/evaluate cause of self-care deficits   - Assess range of motion  - Assess patient's mobility; develop plan if impaired  - Assess patient's need for assistive devices and provide as appropriate  - Encourage maximum independence but intervene and supervise when necessary  - Involve family in performance of ADLs  - Assess for home care needs following discharge   - Consider OT consult to assist with ADL evaluation and planning for discharge  - Provide patient education as appropriate  Outcome: Progressing  Goal: Maintains/Returns to pre admission functional level  Description: INTERVENTIONS:  - Perform BMAT or MOVE assessment daily    - Set and communicate daily mobility goal to care team and patient/family/caregiver  - Collaborate with rehabilitation services on mobility goals if consulted  - Perform Range of Motion 3 times a day  - Reposition patient every 2 hours    - Dangle patient 3 times a day  - Stand patient 3 times a day  - Ambulate patient 3 times a day  - Out of bed to chair 3 times a day   - Out of bed for meals 3 times a day  - Out of bed for toileting  - Record patient progress and toleration of activity level   Outcome: Progressing     Problem: DISCHARGE PLANNING  Goal: Discharge to home or other facility with appropriate resources  Description: INTERVENTIONS:  - Identify barriers to discharge w/patient and caregiver  - Arrange for needed discharge resources and transportation as appropriate  - Identify discharge learning needs (meds, wound care, etc )  - Arrange for interpretive services to assist at discharge as needed  - Refer to Case Management Department for coordinating discharge planning if the patient needs post-hospital services based on physician/advanced practitioner order or complex needs related to functional status, cognitive ability, or social support system  Outcome: Progressing     Problem: Knowledge Deficit  Goal: Patient/family/caregiver demonstrates understanding of disease process, treatment plan, medications, and discharge instructions  Description: Complete learning assessment and assess knowledge base    Interventions:  - Provide teaching at level of understanding  - Provide teaching via preferred learning methods  Outcome: Progressing     Problem: NEUROSENSORY - ADULT  Goal: Achieves stable or improved neurological status  Description: INTERVENTIONS  - Monitor and report changes in neurological status  - Monitor vital signs such as temperature, blood pressure, glucose, and any other labs ordered   - Initiate measures to prevent increased intracranial pressure  - Monitor for seizure activity and implement precautions if appropriate      Outcome: Progressing     Problem: SKIN/TISSUE INTEGRITY - ADULT  Goal: Skin Integrity remains intact(Skin Breakdown Prevention)  Description: Assess:  -Perform Daniel assessment every shift  -Clean and moisturize skin every shift  -Inspect skin when repositioning, toileting, and assisting with ADLS  -Assess extremities for adequate circulation and sensation     Bed Management:  -Have minimal linens on bed & keep smooth, unwrinkled  -Change linens as needed when moist or perspiring  -Avoid sitting or lying in one position for more than 2 hours while in bed  -Keep HOB at 30 degrees     Toileting:  -Offer bedside commode  -Assess for incontinence every 2 hrs  -Use incontinent care products after each incontinent episode such as wipes    Activity:  -Mobilize patient 3 times a day  -Encourage activity and walks on unit  -Encourage or provide ROM exercises   -Turn and reposition patient every 2 Hours  -Use appropriate equipment to lift or move patient in bed  -Instruct/ Assist with weight shifting every hr when out of bed in chair  -Consider limitation of chair time 2 hour intervals    Skin Care:  -Avoid use of baby powder, tape, friction and shearing, hot water or constrictive clothing  -Relieve pressure over bony prominences using weight shift  -Do not massage red bony areas  Outcome: Progressing     Problem: MUSCULOSKELETAL - ADULT  Goal: Maintain or return mobility to safest level of function  Description: INTERVENTIONS:  - Assess patient's ability to carry out ADLs; assess patient's baseline for ADL function and identify physical deficits which impact ability to perform ADLs (bathing, care of mouth/teeth, toileting, grooming, dressing, etc )  - Assess/evaluate cause of self-care deficits   - Assess range of motion  - Assess patient's mobility  - Assess patient's need for assistive devices and provide as appropriate  - Encourage maximum independence but intervene and supervise when necessary  - Involve family in performance of ADLs  - Assess for home care needs following discharge   - Consider OT consult to assist with ADL evaluation and planning for discharge  - Provide patient education as appropriate  Outcome: Progressing

## 2023-01-11 NOTE — DISCHARGE SUMMARY
[place]  Discharge- Shannen Sevilla 1945, 76 y o  female MRN: [id number]  Unit/Bed#: -01 Encounter: [id number]  Primary Care Provider: Jerry Forrester DO   Date and time admitted to hospital: 1/10/2023  9:45 PM    Hyponatremia  Assessment & Plan  · Labs in ED: 129  · Baseline: 136  · Improved to 130  F/u as outpt    Hyperlipidemia  Assessment & Plan  · Home med: Lipitor 10 mg daily    Parkinson disease ([facility] [address] )  Assessment & Plan  · Last seen by neurology on 7/6/22 with increased bradykinesia, rigidity and gait difficulty with postural instability  · Encouraged to use walker and continued physical therapy  · Noted to have some cognitive issues intermittently on outpatient notes thought to be related to the parkinson's  · Oriented to person and place but not time in the ED 1/10/22  · PT/OT eval  · Sinemet 25/100 to 1 tab q9am, 1/2 tab at 1pm, 1 tab at 5pm and 1/2 tab at 9pm  · Follow up with outpatient Neurology for continued management    Essential hypertension  Assessment & Plan  · Home med: Norvasc 5 mg and Lopressor 50 mg daily    Glaucoma  Assessment & Plan  · Home med: latanoprost (XALATAN) 0 005 % ophthalmic solution, Alphagan P 0 1 %, timolol (BETIMOL) 0 5 % ophthalmic solution    Gastroesophageal reflux disease  Assessment & Plan  · Home med: esomeprazole (NexIUM) 20 mg daily    Chronic constipation  Assessment & Plan  · Home med: docusate sodium (COLACE) 100 mg BID    * Unspecified fracture of sternum, initial encounter for closed fracture  Assessment & Plan  · Patient fell off toilet hitting left side of face and chest with no loss of consciousness  Not on blood thinners  · CT chest abdomen pelvis  · Acute sternal fracture  No substernal or epicardial hematoma  · CT head negative  · PT/OT - for home health  · Pain control - pt appears asymptomatic  · Case management         Hospital Course:      Shannen Sevilla is a 76 y o  female patient who originally presented to the hospital on   Admission Orders (From admission, onward)     Ordered        01/10/23 2331  INPATIENT ADMISSION  Once                     due to mechanical fall  Patient was found to have uncomplicated sternal fracture  Patient has no pain  She feels well  Patient patient's  is at bedside and feels she is at baseline  She was cleared by physical therapy  Patient can follow-up with outpatient primary care physician for further BMP monitoring  Please see above list of diagnoses and related plan for additional information  Physical Exam:    GEN: No acute distress, comfortable  HEEENT: No JVD, PERRLA, no scleral icterus  RESP: Lungs clear to auscultation bilaterally  CV: RRR, +s1/s2   ABD: SOFT NON TENDER, POSITIVE BOWEL SOUNDS, NO DISTENTION  PSYCH: CALM  NEURO: Mentation baseline, NO FOCAL DEFICITS  SKIN: NO RASH  EXTREM: NO EDEMA    CONSULTING PROVIDERS   IP CONSULT TO CASE MANAGEMENT    PROCEDURES PERFORMED  * No surgery found *    RADIOLOGY RESULTS  XR Trauma chest portable    Result Date: 1/11/2023  Narrative: CHEST INDICATION:   TRAUMA  COMPARISON:  Chest x-ray dated 11/8/2021 EXAM PERFORMED/VIEWS:  XR CHEST PORTABLE FINDINGS: Lung volumes are low but no pneumothorax is seen  The visualized lungs appear grossly clear  Aortic arch calcifications  The cardiac and mediastinal contours appear unremarkable otherwise  The visualized bones appear intact  Impression: Lung volumes are low but no acute cardiopulmonary disease is seen  Other findings as above  Workstation performed: [id number]     TRAUMA - CT head wo contrast    Result Date: 1/10/2023  Narrative: CT BRAIN - WITHOUT CONTRAST INDICATION:   Headache and head trauma  COMPARISON:  11/8/2021  TECHNIQUE:  CT examination of the brain was performed  In addition to axial images, sagittal and coronal 2D reformatted images were created and submitted for interpretation  Radiation dose length product (DLP) for this visit:  865 03 mGy-cm     This examination, like all CT scans performed in the [facility], was performed utilizing techniques to minimize radiation dose exposure, including the use of iterative  reconstruction and automated exposure control  IMAGE QUALITY:  Diagnostic  FINDINGS: PARENCHYMA:  Periventricular and subcortical hypoattenuating foci consistent with microangiopathic disease  No acute intracranial hemorrhage or mass effect  VENTRICLES AND EXTRA-AXIAL SPACES: No hydrocephalus or extra-axial collection  VISUALIZED ORBITS AND PARANASAL SINUSES: Intact globes and orbits  Clear paranasal sinuses  CALVARIUM AND EXTRACRANIAL SOFT TISSUES:  Mild frontal hyperostosis  No acute calvarial fracture  Impression: No acute intracranial abnormality  Workstation performed: [id number]     TRAUMA - CT facial bones wo contrast    Result Date: 1/10/2023  Narrative: CT FACIAL BONES WITHOUT INTRAVENOUS CONTRAST INDICATION:   Facial pain and trauma  COMPARISON: None  TECHNIQUE:  Axial CT images were obtained through the facial bones with additional sagittal and coronal reconstructions  Radiation dose length product (DLP) for this visit:  252 51 mGy-cm   This examination, like all CT scans performed in the [facility], was performed utilizing techniques to minimize radiation dose exposure, including the use of iterative  reconstruction and automated exposure control  IMAGE QUALITY:  Diagnostic  FINDINGS: FACIAL BONES:  Intact nasal bones  Chronic left nasal septal deviation  No orbital, zygomatic or maxillary fracture  Normal alignment of the temporomandibular joints  No mandible fracture  Extensive dental caries throughout the remaining maxillary and mandibular teeth possible periapical abscesses in the mandibular teeth   ORBITS:  Intact globes  No retrobulbar hematoma or proptosis  SINUSES:  No blood products or mucosal thickening  SOFT TISSUES:  No hematoma       Impression: No evidence of acute maxillofacial fracture  Workstation performed: [id number]     TRAUMA - CT spine cervical wo contrast    Result Date: 1/10/2023  Narrative: CT CERVICAL SPINE - WITHOUT CONTRAST INDICATION:   Neck pain and trauma  COMPARISON:  11/8/2021  TECHNIQUE:  CT examination of the cervical spine was performed without intravenous contrast   Contiguous axial images were obtained  Sagittal and coronal reconstructions were performed  Radiation dose length product (DLP) for this visit:  299 77 mGy-cm   This examination, like all CT scans performed in the [facility], was performed utilizing techniques to minimize radiation dose exposure, including the use of iterative  reconstruction and automated exposure control  IMAGE QUALITY:  Diagnostic  FINDINGS: ALIGNMENT:  Normal alignment of the cervical spine  No subluxation  VERTEBRAL BODIES:  No acute cervical spine fracture  DEGENERATIVE CHANGES:  Disc and facet osteophytosis  No significant central canal stenosis  Mild neural foraminal narrowing  PREVERTEBRAL AND PARASPINAL SOFT TISSUES: No soft tissue swelling THORACIC INLET:  Clear lung apices  Impression: No acute cervical spine fracture or traumatic malalignment  Workstation performed: [id number]     TRAUMA - CT chest abdomen pelvis w contrast    Result Date: 1/10/2023  Narrative: CT CHEST, ABDOMEN AND PELVIS WITH IV CONTRAST INDICATION:   Chest and abdominal pain and blunt trauma  COMPARISON:  1/10/2023  TECHNIQUE: CT examination of the chest, abdomen and pelvis was performed  Axial, sagittal, and coronal 2D reformatted images were created from the source data and submitted for interpretation  Radiation dose length product (DLP) for this visit:  543 35 mGy-cm   This examination, like all CT scans performed in the [facility], was performed utilizing techniques to minimize radiation dose exposure, including the use of iterative  reconstruction and automated exposure control   IV Contrast:  100 mL of iohexol (OMNIPAQUE) Enteric Contrast: Enteric contrast was administered  FINDINGS: CHEST LUNGS:  Lungs are clear  There is no tracheal or endobronchial lesion  PLEURA:  No hemothorax  HEART/GREAT VESSELS: Cardiomegaly  No thoracic aortic aneurysm or dissection  No epicardial hematoma  MEDIASTINUM AND CURLY:  No hematoma  CHEST WALL AND LOWER NECK:  No substernal hematoma  ABDOMEN LIVER/BILIARY TREE:  Mild distention of intrahepatic and common bile ducts, likely postsurgical   No evidence of choledocholithiasis  GALLBLADDER:  Cholecystectomy  SPLEEN:  Unremarkable  PANCREAS:  Unremarkable  ADRENAL GLANDS:  Unremarkable  KIDNEYS/URETERS:  Unremarkable  No hydronephrosis  STOMACH AND BOWEL:  Large amount of stool distends the entire colon  APPENDIX:  No findings to suggest appendicitis  ABDOMINOPELVIC CAVITY:  No free intraperitoneal air or hemoperitoneum  VESSELS:  No abdominal aortic aneurysm  PELVIS REPRODUCTIVE ORGANS:  Unremarkable for patient's age  URINARY BLADDER:  Unremarkable  ABDOMINAL WALL/INGUINAL REGIONS:  Unremarkable  OSSEOUS STRUCTURES:  Normal alignment of left hip arthroplasty  Thoracolumbar scoliosis and advanced disc and facet degenerative change  No acute thoracic, lumbar spine or pelvic fracture  Deformity of the sternum consistent with a fracture, likely acute  No acute rib fracture  Impression: 1  Acute sternal fracture  No substernal or epicardial hematoma  2   No acute trauma in the abdomen or pelvis  3   Constipation   Workstation performed: [id number]       LABS  Results from last 7 days   Lab Units 01/11/23  0552 01/10/23  2205   WBC Thousand/uL 9 34 9 56   HEMOGLOBIN g/dL 10 3* 11 3*   HEMATOCRIT % 33 0* 36 5   MCV fL 68* 68*   PLATELETS Thousands/uL 227  251 271     Results from last 7 days   Lab Units 01/11/23  0552 01/10/23  2303 01/10/23  2205   SODIUM mmol/L 130* 129* 129*   POTASSIUM mmol/L 3 9 3 9 5 9*   CHLORIDE mmol/L 101 99 99   CO2 mmol/L 24 26 28   BUN mg/dL 14 13 13 CREATININE mg/dL 0 80 0 82 0 83   CALCIUM mg/dL 8 7 8 7 8 7   EGFR ml/min/1 73sq m 70 68 67   GLUCOSE RANDOM mg/dL 105 106 106     Results from last 7 days   Lab Units 01/10/23  2205   HS TNI 0HR ng/L 4                                    Cultures:         Invalid input(s): URIBILINOGEN                Condition at Discharge:  good      Discharge instructions/Information to patient and family:   See after visit summary for information provided to patient and family  Provisions for Follow-Up Care:  See after visit summary for information related to follow-up care and any pertinent home health orders  Disposition:     Home       Discharge Statement:  I spent 38 minutes discharging the patient  This time was spent on the day of discharge  I had direct contact with the patient on the day of discharge  Greater than 50% of the total time was spent examining patient, answering all patient questions, arranging and discussing plan of care with patient as well as directly providing post-discharge instructions  Additional time then spent on discharge activities  d/w patient     Discharge Medications:  See after visit summary for reconciled discharge medications provided to patient and family        ** Please Note: This note has been constructed using a voice recognition system **

## 2023-01-12 ENCOUNTER — TRANSITIONAL CARE MANAGEMENT (OUTPATIENT)
Dept: FAMILY MEDICINE CLINIC | Facility: CLINIC | Age: 78
End: 2023-01-12

## 2023-01-12 ENCOUNTER — TELEPHONE (OUTPATIENT)
Dept: FAMILY MEDICINE CLINIC | Facility: CLINIC | Age: 78
End: 2023-01-12

## 2023-01-12 NOTE — TELEPHONE ENCOUNTER
Fabio Sky from [facility] called to notify you that patient started home care today      If you have questions, Fabio Sky can be reached at [phone]    Thank you

## 2023-01-13 ENCOUNTER — OFFICE VISIT (OUTPATIENT)
Dept: FAMILY MEDICINE CLINIC | Facility: CLINIC | Age: 78
End: 2023-01-13

## 2023-01-13 VITALS
HEIGHT: 59 IN | OXYGEN SATURATION: 94 % | TEMPERATURE: 97.9 F | DIASTOLIC BLOOD PRESSURE: 82 MMHG | HEART RATE: 71 BPM | RESPIRATION RATE: 19 BRPM | SYSTOLIC BLOOD PRESSURE: 124 MMHG | WEIGHT: 112.8 LBS | BODY MASS INDEX: 22.74 KG/M2

## 2023-01-13 DIAGNOSIS — S22.20XD CLOSED FRACTURE OF STERNUM WITH ROUTINE HEALING, UNSPECIFIED PORTION OF STERNUM, SUBSEQUENT ENCOUNTER: ICD-10-CM

## 2023-01-13 DIAGNOSIS — I48.92 ATRIAL FLUTTER, UNSPECIFIED TYPE (HCC): ICD-10-CM

## 2023-01-13 DIAGNOSIS — R26.89 BALANCE DISORDER: ICD-10-CM

## 2023-01-13 DIAGNOSIS — I70.0 ATHEROSCLEROSIS OF AORTA (HCC): ICD-10-CM

## 2023-01-13 DIAGNOSIS — M54.16 LUMBAR RADICULOPATHY: ICD-10-CM

## 2023-01-13 DIAGNOSIS — G20 PARKINSON DISEASE (HCC): Primary | ICD-10-CM

## 2023-01-13 LAB
BLOOD GROUP ANTIBODIES SERPL: NORMAL
BLOOD GROUP ANTIBODIES SERPL: NORMAL

## 2023-02-14 DIAGNOSIS — I10 ESSENTIAL HYPERTENSION: ICD-10-CM

## 2023-02-14 RX ORDER — AMLODIPINE BESYLATE 5 MG/1
TABLET ORAL
Qty: 90 TABLET | Refills: 0 | Status: SHIPPED | OUTPATIENT
Start: 2023-02-14

## 2023-03-22 DIAGNOSIS — I10 HYPERTENSION, ESSENTIAL: ICD-10-CM

## 2023-03-22 RX ORDER — METOPROLOL TARTRATE 50 MG/1
50 TABLET, FILM COATED ORAL DAILY
Qty: 90 TABLET | Refills: 1 | Status: SHIPPED | OUTPATIENT
Start: 2023-03-22

## 2023-04-04 DIAGNOSIS — E78.5 HYPERLIPIDEMIA, UNSPECIFIED HYPERLIPIDEMIA TYPE: ICD-10-CM

## 2023-04-04 RX ORDER — ATORVASTATIN CALCIUM 10 MG/1
TABLET, FILM COATED ORAL
Qty: 90 TABLET | Refills: 0 | Status: ON HOLD | OUTPATIENT
Start: 2023-04-04
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
## 2023-04-09 PROBLEM — S72.001A CLOSED RIGHT HIP FRACTURE (HCC): Status: ACTIVE | Noted: 2023-04-09

## 2023-04-11 PROBLEM — R74.01 TRANSAMINITIS: Status: ACTIVE | Noted: 2023-04-11

## 2023-04-25 ENCOUNTER — OFFICE VISIT (OUTPATIENT)
Dept: OBGYN CLINIC | Facility: CLINIC | Age: 78
End: 2023-04-25

## 2023-04-25 ENCOUNTER — APPOINTMENT (OUTPATIENT)
Dept: RADIOLOGY | Facility: CLINIC | Age: 78
End: 2023-04-25

## 2023-04-25 VITALS — DIASTOLIC BLOOD PRESSURE: 80 MMHG | SYSTOLIC BLOOD PRESSURE: 122 MMHG

## 2023-04-25 DIAGNOSIS — Z47.89 AFTERCARE FOLLOWING SURGERY OF THE MUSCULOSKELETAL SYSTEM: Primary | ICD-10-CM

## 2023-04-25 DIAGNOSIS — Z47.89 AFTERCARE FOLLOWING SURGERY OF THE MUSCULOSKELETAL SYSTEM: ICD-10-CM

## 2023-04-25 RX ORDER — ASPIRIN 81 MG/1
81 TABLET ORAL DAILY
COMMUNITY

## 2023-04-25 NOTE — ASSESSMENT & PLAN NOTE
Dalton Anglin is doing well status post right hip hemiarthroplasty on April 10, 2023  X-rays were reviewed with her and her  that reveal a well aligned prosthesis with no evidence of loosening  Continue physical therapy at rehab facility  Continue posterior hip precautions  Follow-up in 3 months with repeat x-rays  All questions were answered to patient's satisfaction

## 2023-04-25 NOTE — PROGRESS NOTES
Assessment:     1  Aftercare following surgery of the musculoskeletal system        Plan:     Problem List Items Addressed This Visit        Other    Aftercare following surgery of the musculoskeletal system - Primary     Jeferson Isaac is doing well status post right hip hemiarthroplasty on April 10, 2023  X-rays were reviewed with her and her  that reveal a well aligned prosthesis with no evidence of loosening  Continue physical therapy at rehab facility  Continue posterior hip precautions  Follow-up in 3 months with repeat x-rays  All questions were answered to patient's satisfaction  Relevant Orders    XR hip/pelv 2-3 vws right if performed      Subjective:     Patient ID: Lesley Burkitt is a 76 y o  female  Chief Complaint: This is a 70-year-old white female with Parkinson's accompanied by her  who is status post right hip hemiarthroplasty on April 10, 2023  She currently resides at [facility] and arrives in a wheelchair  She is doing well  She denies any pain in her right hip  She is ambulating with a walker  Her  states she walked 100 feet today  She is starting to do some exercises with the stairs  She denies any fevers or chills  Allergy:  Allergies   Allergen Reactions   • Sulfamethoxazole Other (See Comments)     [facility]   • Trimethoprim Other (See Comments)     [facility] record allergy unknown     Medications:  all current active meds have been reviewed  Past Medical History:  Past Medical History:   Diagnosis Date   • Constipation    • Hyperlipidemia    • Hypertension    • Parkinson disease ([facility] [address] )      Past Surgical History:  Past Surgical History:   Procedure Laterality Date   •  SECTION     • CHOLECYSTECTOMY     • [place] HEMIARTHROPLASTY HIP PARTIAL Left 2021    Procedure: HEMIARTHROPLASTY HIP (BIPOLAR);   Surgeon: Flor Ceja MD;  Location: [facility];  Service: Orthopedics   • [place] HEMIARTHROPLASTY HIP PARTIAL Right 4/10/2023    Procedure: HEMIARTHROPLASTY HIP (BIPOLAR), RIGHT;  Surgeon: Manisha Booker MD;  Location:  MAIN OR;  Service: Orthopedics     Family History:  Family History   Problem Relation Age of Onset   • Tremor Neg Hx    • Parkinsonism Neg Hx      Social History:  Social History     Substance and Sexual Activity   Alcohol Use Yes    Comment: Socially     Social History     Substance and Sexual Activity   Drug Use No     Social History     Tobacco Use   Smoking Status Former   • Packs/day: 0 25   • Types: Cigarettes   • Start date: 63   • Quit date: 2011   • Years since quittin 7   Smokeless Tobacco Never     Review of Systems   Constitutional: Negative  HENT: Negative  Eyes: Negative  Respiratory: Negative  Cardiovascular: Negative  Gastrointestinal: Negative  Endocrine: Negative  Genitourinary: Negative  Musculoskeletal: Positive for arthralgias and gait problem (in a wheelchair)  Skin: Negative  Allergic/Immunologic: Negative  Hematological: Negative  Psychiatric/Behavioral: Negative  Objective:  BP Readings from Last 1 Encounters:   23 122/80      Wt Readings from Last 1 Encounters:   23 53 1 kg (117 lb 1 oz)      BMI:   Estimated body mass index is 22 86 kg/m² as calculated from the following:    Height as of 23: 5' (1 524 m)  Weight as of 23: 53 1 kg (117 lb 1 oz)  BSA:   Estimated body surface area is 1 49 meters squared as calculated from the following:    Height as of 23: 5' (1 524 m)  Weight as of 23: 53 1 kg (117 lb 1 oz)  Physical Exam  Constitutional:       General: She is not in acute distress  Appearance: She is well-developed  HENT:      Head: Normocephalic  Eyes:      Conjunctiva/sclera: Conjunctivae normal       Pupils: Pupils are equal, round, and reactive to light  Pulmonary:      Effort: Pulmonary effort is normal  No respiratory distress  Skin:     General: Skin is warm and dry  Neurological:      Mental Status: She is alert and oriented to person, place, and time  Psychiatric:         Behavior: Behavior normal        Right Hip Exam     Other   Erythema: absent  Scars: present (Well-healing incision with no evidence of infection  No active drainage )  Sensation: normal  Pulse: present    Comments:  Good passive range of motion of hip  Leg lengths equal  Calf soft, nontender  Actively moving ankle and toes            I have personally reviewed pertinent films in PACS and my interpretation is X-rays of the right hip reveal a well aligned prosthesis with no evidence of loosening

## 2023-05-17 DIAGNOSIS — G20 PARKINSON DISEASE (HCC): ICD-10-CM

## 2023-05-18 RX ORDER — SELEGILINE HYDROCHLORIDE 5 MG/1
CAPSULE ORAL
Qty: 180 CAPSULE | Refills: 0 | Status: SHIPPED | OUTPATIENT
Start: 2023-05-18

## 2023-06-22 NOTE — ASSESSMENT & PLAN NOTE
PDD with progression  Previously unable to tolerate donepezil  Time spent discussing dementia in PD and its progression   Side effects to medication outweighs benefit at this point so we will focus on treating motor symptoms of PD  ED MD

## 2023-11-05 ENCOUNTER — APPOINTMENT (EMERGENCY)
Dept: RADIOLOGY | Facility: HOSPITAL | Age: 78
End: 2023-11-05
Payer: MEDICARE

## 2023-11-05 ENCOUNTER — APPOINTMENT (EMERGENCY)
Dept: CT IMAGING | Facility: HOSPITAL | Age: 78
End: 2023-11-05
Payer: MEDICARE

## 2023-11-05 ENCOUNTER — HOSPITAL ENCOUNTER (EMERGENCY)
Facility: HOSPITAL | Age: 78
Discharge: HOME | End: 2023-11-05
Attending: EMERGENCY MEDICINE
Payer: MEDICARE

## 2023-11-05 VITALS
TEMPERATURE: 97.8 F | SYSTOLIC BLOOD PRESSURE: 132 MMHG | HEART RATE: 63 BPM | OXYGEN SATURATION: 93 % | DIASTOLIC BLOOD PRESSURE: 68 MMHG | RESPIRATION RATE: 18 BRPM
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
## 2023-11-05 DIAGNOSIS — S00.83XA TRAUMATIC HEMATOMA OF FOREHEAD: ICD-10-CM

## 2023-11-05 DIAGNOSIS — M25.551 RIGHT HIP PAIN: ICD-10-CM

## 2023-11-05 LAB
ALBUMIN SERPL BCP-MCNC: 3.9 G/DL (ref 3.5–5)
ALP SERPL-CCNC: 91 U/L (ref 34–104)
ALT SERPL W P-5'-P-CCNC: 7 U/L (ref 7–52)
ANION GAP SERPL CALCULATED.3IONS-SCNC: 8 MMOL/L
AST SERPL W P-5'-P-CCNC: 29 U/L (ref 13–39)
BASOPHILS # BLD AUTO: 0.03 THOUSANDS/ÂΜL (ref 0–0.1)
BASOPHILS NFR BLD AUTO: 0 % (ref 0–1)
BILIRUB SERPL-MCNC: 0.89 MG/DL (ref 0.2–1)
BUN SERPL-MCNC: 16 MG/DL (ref 5–25)
CALCIUM SERPL-MCNC: 8.7 MG/DL (ref 8.4–10.2)
CHLORIDE SERPL-SCNC: 100 MMOL/L (ref 96–108)
CO2 SERPL-SCNC: 25 MMOL/L (ref 21–32)
CREAT SERPL-MCNC: 0.67 MG/DL (ref 0.6–1.3)
EOSINOPHIL # BLD AUTO: 0.21 THOUSAND/ÂΜL (ref 0–0.61)
EOSINOPHIL NFR BLD AUTO: 2 % (ref 0–6)
ERYTHROCYTE [DISTWIDTH] IN BLOOD BY AUTOMATED COUNT: 16.1 % (ref 11.6–15.1)
GFR SERPL CREATININE-BSD FRML MDRD: 83 ML/MIN/1.73SQ M
GLUCOSE SERPL-MCNC: 111 MG/DL (ref 65–140)
HCT VFR BLD AUTO: 31.4 % (ref 34.8–46.1)
HGB BLD-MCNC: 9.9 G/DL (ref 11.5–15.4)
IMM GRANULOCYTES # BLD AUTO: 0.08 THOUSAND/UL (ref 0–0.2)
IMM GRANULOCYTES NFR BLD AUTO: 1 % (ref 0–2)
LYMPHOCYTES # BLD AUTO: 1.02 THOUSANDS/ÂΜL (ref 0.6–4.47)
LYMPHOCYTES NFR BLD AUTO: 9 % (ref 14–44)
MCH RBC QN AUTO: 21.2 PG (ref 26.8–34.3)
MCHC RBC AUTO-ENTMCNC: 31.5 G/DL (ref 31.4–37.4)
MCV RBC AUTO: 67 FL (ref 82–98)
MONOCYTES # BLD AUTO: 0.8 THOUSAND/ÂΜL (ref 0.17–1.22)
MONOCYTES NFR BLD AUTO: 7 % (ref 4–12)
NEUTROPHILS # BLD AUTO: 9.12 THOUSANDS/ÂΜL (ref 1.85–7.62)
NEUTS SEG NFR BLD AUTO: 81 % (ref 43–75)
NRBC BLD AUTO-RTO: 0 /100 WBCS
PLATELET # BLD AUTO: 244 THOUSANDS/UL (ref 149–390)
PMV BLD AUTO: 9.8 FL (ref 8.9–12.7)
POTASSIUM SERPL-SCNC: 3.6 MMOL/L (ref 3.5–5.3)
PROT SERPL-MCNC: 6.5 G/DL (ref 6.4–8.4)
RBC # BLD AUTO: 4.66 MILLION/UL (ref 3.81–5.12)
SODIUM SERPL-SCNC: 133 MMOL/L (ref 135–147)
WBC # BLD AUTO: 11.26 THOUSAND/UL (ref 4.31–10.16)

## 2023-11-05 PROCEDURE — 73552 X-RAY EXAM OF FEMUR 2/>: CPT

## 2023-11-05 PROCEDURE — 80053 COMPREHEN METABOLIC PANEL: CPT | Performed by: EMERGENCY MEDICINE

## 2023-11-05 PROCEDURE — 36415 COLL VENOUS BLD VENIPUNCTURE: CPT | Performed by: EMERGENCY MEDICINE

## 2023-11-05 PROCEDURE — 99284 EMERGENCY DEPT VISIT MOD MDM: CPT

## 2023-11-05 PROCEDURE — 85025 COMPLETE CBC W/AUTO DIFF WBC: CPT | Performed by: EMERGENCY MEDICINE

## 2023-11-05 PROCEDURE — 70450 CT HEAD/BRAIN W/O DYE: CPT

## 2023-11-05 PROCEDURE — 71045 X-RAY EXAM CHEST 1 VIEW: CPT

## 2023-11-05 PROCEDURE — 72170 X-RAY EXAM OF PELVIS: CPT

## 2023-11-05 PROCEDURE — 99285 EMERGENCY DEPT VISIT HI MDM: CPT | Performed by: EMERGENCY MEDICINE

## 2023-11-05 PROCEDURE — 72125 CT NECK SPINE W/O DYE: CPT

## 2023-11-06 NOTE — ED PROVIDER NOTES
Emergency Department Trauma Note  Soraya Parish 78 y.o. female MRN: [id number]  Unit/Bed#: ED 08/ED 08 Encounter: [id number]      Trauma Alert: Trauma Acuity: Trauma Evaluation  Model of Arrival:   via    Trauma Team: Current Providers  Attending Provider: Danay Shields MD  Registered Nurse: Shereen Larry RN  Consultants:     None      History of Present Illness     Chief Complaint:   Chief Complaint   Patient presents with    Fall     EMS stated that pt is from [facility] [place]. EMS stated that pt fell sometime in the morning. EMS stated that family visited pt this evening and wanted pt to be evaluated. EMS denies any thinners and stated that pt is confused at baseline      HPI:  Soraya Parish is a 78 y.o. female who presents with fall. Mechanism:Details of Incident: fall unwittnessed in morning          78year old female presents for evaluation after fall earlier this morning. Patient has history of dementia and Parkinsonism and is a poor historian at baseline. According to EMS report, she had been complaining of right hip pain and the family was concerned about the hematoma over her right forehead. Patient's only complaints at this time is discomfort from the C-collar and pain at the site of the hematoma. Patient takes aspirin daily.       Fall    Review of Systems    Historical Information     Immunizations:   Immunization History   Administered Date(s) Administered    COVID-19 PFIZER VACCINE 0.3 ML IM 2021, 2021, 12/10/2021    Influenza, high dose seasonal 0.7 mL 2019       Past Medical History:   Diagnosis Date    Constipation     Hyperlipidemia     Hypertension     Parkinson disease        Family History   Problem Relation Age of Onset    Tremor Neg Hx     Parkinsonism Neg Hx      Past Surgical History:   Procedure Laterality Date     SECTION      CHOLECYSTECTOMY      [place] HEMIARTHROPLASTY HIP PARTIAL Left 2021    Procedure: HEMIARTHROPLASTY HIP (BIPOLAR); Surgeon: Luc Gutierrez MD;  Location:  MAIN OR;  Service: Orthopedics    [place] HEMIARTHROPLASTY HIP PARTIAL Right 4/10/2023    Procedure: HEMIARTHROPLASTY HIP (BIPOLAR), RIGHT;  Surgeon: Nick Kidd MD;  Location:  MAIN OR;  Service: Orthopedics     Social History     Tobacco Use    Smoking status: Former     Packs/day: 0.25     Types: Cigarettes     Start date:      Quit date: 2011     Years since quittin.2    Smokeless tobacco: Never   Vaping Use    Vaping Use: Never used   Substance Use Topics    Alcohol use: Yes     Comment: Socially    Drug use: No     E-Cigarette/Vaping    E-Cigarette Use Never User      E-Cigarette/Vaping Substances    Nicotine No     THC No     CBD No     Flavoring No     Other No     Unknown No        Family History: non-contributory    Meds/Allergies   Prior to Admission Medications   Prescriptions Last Dose Informant Patient Reported? Taking? Alphagan P 0.1 %  Self Yes No   Sig: INSTILL 1 DROP INTO EACH EYE TWICE DAILY   acetaminophen (TYLENOL) 325 mg tablet  Self No No   Sig: Take 3 tablets (975 mg total) by mouth every 8 (eight) hours   amLODIPine (NORVASC) 10 mg tablet   No No   Sig: Take 1 tablet (10 mg total) by mouth daily Do not start before April 15, 2023.    aspirin (ECOTRIN LOW STRENGTH) 81 mg EC tablet   Yes No   Sig: Take 81 mg by mouth daily   atorvastatin (LIPITOR) 10 mg tablet   No No   Sig: TAKE 1 TABLET BY MOUTH AT NIGHT   carbidopa-levodopa (Sinemet)  mg per tablet  Self No No   Si tab at 9am, and 5pm and 1/2 tab 1 p, and 9pm   Patient taking differently: Take 0.5 tablets by mouth 4 (four) times a day 1/2 tab at 9am, 1/2 1 pm and 1/2 tab 5 pm, and 1/2 tab 9 pm   docusate sodium (COLACE) 100 mg capsule  Self No No   Sig: Take 1 capsule (100 mg total) by mouth 2 (two) times a day   latanoprost (XALATAN) 0.005 % ophthalmic solution  Self Yes No   Si drop daily at bedtime   metoprolol tartrate (LOPRESSOR) 50 mg tablet   No No   Sig: Take 1 tablet (50 mg total) by mouth daily   polyethylene glycol (MIRALAX) 17 g packet  Spouse/Significant Other No No   Sig: Take 17 g by mouth daily as needed (constipation) for up to 14 days   selegiline (ELDEPRYL) 5 mg capsule   No No   Sig: TAKE 1 CAPSULE BY MOUTH TWICE DAILY BEFORE  BREAKFAST  AND  LUNCH   timolol (BETIMOL) 0.5 % ophthalmic solution  Self Yes No   Sig: Administer 1 drop to both eyes daily       Facility-Administered Medications: None       Allergies   Allergen Reactions    Sulfamethoxazole Other (See Comments)     [facility] record allergy unkown    Trimethoprim Other (See Comments)     [facility] record allergy unknown       PHYSICAL EXAM    PE limited by: none    Objective   Vitals:   First set: Temperature: 97.8 °F (36.6 °C) (11/05/23 1937)  Pulse: 69 (11/05/23 1937)  Respirations: 18 (11/05/23 1937)  Blood Pressure: 143/67 (11/05/23 1937)  SpO2: 95 % (11/05/23 1937)    Primary Survey:   (A) Airway: patent  (B) Breathing: bilateral breath sounds  (C) Circulation: Pulses:   normal  (D) Disabliity:  GCS Total:  14, Eye Opening:   Spontaneous = 4, Motor Response: Obeys commands = 6, and Verbal Response:  Confused = 4  (E) Expose:  Completed    Secondary Survey: (Click on Physical Exam tab above)  Physical Exam  Vitals and nursing note reviewed. HENT:      Head: Normocephalic. Eyes:      Extraocular Movements: Extraocular movements intact. Conjunctiva/sclera: Conjunctivae normal.      Pupils: Pupils are equal, round, and reactive to light. Cardiovascular:      Rate and Rhythm: Normal rate and regular rhythm. Pulses: Normal pulses. Pulmonary:      Effort: Pulmonary effort is normal. No respiratory distress. Breath sounds: Normal breath sounds. Abdominal:      General: There is no distension. Palpations: Abdomen is soft. Tenderness: There is no abdominal tenderness. Musculoskeletal:         General: No deformity. Right lower leg: No edema.       Left lower leg: No edema. Comments: No midline C/T/L spine tenderness. No step offs or deformities. Patient does not allow me to range lower extremities. No pelvic tenderness or instability. Skin:     General: Skin is warm and dry. Neurological:      Mental Status: She is alert. Cervical spine cleared by clinical criteria?  No (imaging required)      Invasive Devices       Drain  Duration             External Urinary Catheter 210 days                    Lab Results:   Results Reviewed       Procedure Component Value Units Date/Time    Comprehensive metabolic panel [[id number]]  (Abnormal) Collected: 11/05/23 2019    Lab Status: Final result Specimen: Blood from Hand, Right Updated: 11/05/23 2040     Sodium 133 mmol/L      Potassium 3.6 mmol/L      Chloride 100 mmol/L      CO2 25 mmol/L      ANION GAP 8 mmol/L      BUN 16 mg/dL      Creatinine 0.67 mg/dL      Glucose 111 mg/dL      Calcium 8.7 mg/dL      AST 29 U/L      ALT 7 U/L      Alkaline Phosphatase 91 U/L      Total Protein 6.5 g/dL      Albumin 3.9 g/dL      Total Bilirubin 0.89 mg/dL      eGFR 83 ml/min/1.73sq m     Narrative:      [place] guidelines for Chronic Kidney Disease (CKD):     Stage 1 with normal or high GFR (GFR > 90 mL/min/1.73 square meters)    Stage 2 Mild CKD (GFR = 60-89 mL/min/1.73 square meters)    Stage 3A Moderate CKD (GFR = 45-59 mL/min/1.73 square meters)    Stage 3B Moderate CKD (GFR = 30-44 mL/min/1.73 square meters)    Stage 4 Severe CKD (GFR = 15-29 mL/min/1.73 square meters)    Stage 5 End Stage CKD (GFR <15 mL/min/1.73 square meters)  Note: GFR calculation is accurate only with a steady state creatinine    CBC and differential [[id number]]  (Abnormal) Collected: 11/05/23 2019    Lab Status: Final result Specimen: Blood from Hand, Right Updated: 11/05/23 2025     WBC 11.26 Thousand/uL      RBC 4.66 Million/uL      Hemoglobin 9.9 g/dL      Hematocrit 31.4 %      MCV 67 fL      MCH 21.2 pg MCHC 31.5 g/dL      RDW 16.1 %      MPV 9.8 fL      Platelets 091 Thousands/uL      nRBC 0 /100 WBCs      Neutrophils Relative 81 %      Immat GRANS % 1 %      Lymphocytes Relative 9 %      Monocytes Relative 7 %      Eosinophils Relative 2 %      Basophils Relative 0 %      Neutrophils Absolute 9.12 Thousands/µL      Immature Grans Absolute 0.08 Thousand/uL      Lymphocytes Absolute 1.02 Thousands/µL      Monocytes Absolute 0.80 Thousand/µL      Eosinophils Absolute 0.21 Thousand/µL      Basophils Absolute 0.03 Thousands/µL                    Imaging Studies:   Direct to CT: No  XR Trauma chest portable   Final Result by Michael Pinzon MD (11/05 2024)      No acute cardiopulmonary disease. Workstation performed: [id number]         XR Trauma pelvis ap only 1 or 2 vw   ED Interpretation by Verna Sanchez MD (11/05 2024)   Degenerative changes. No acute fractures or dislocations      Final Result by Michael Pinzon MD (11/05 2028)      No acute osseous abnormality. Findings concur with preliminary read provided by referring clinician as documented in EPIC. Workstation performed: [id number]         XR femur 2 views RIGHT   ED Interpretation by Verna Sanchez MD (11/05 2025)   No acute fractures or dislocations. Right hip replacement hardware intact with no periprosthetic fracture. TRAUMA - CT spine cervical wo contrast   Final Result by Michael Pinzon MD (11/05 2022)      No cervical spine fracture or traumatic malalignment. Workstation performed: [id number]         TRAUMA - CT head wo contrast   Final Result by Michael Pinzon MD (11/05 2013)      No intracranial hemorrhage or calvarial fracture. Workstation performed: [id number]               Procedures  Procedures         ED Course  ED Course as of 11/05/23 2112   Mando Morgan Nov 05, 2023 2026 C-collar cleared at bedside   2047 Normally ambulates with a walker per daughter.   Currently being treated for UTI   2059 Patient able to ambulate with a walker on re-evaluation. Medical Decision Making  78year old female presents for evaluation after fall. CXR, XR pelvis and XR right femur without acute traumatic pathology on my independent interpretation. CTH and CT C-spine w/o acute traumatic pathology. PCP follow up. Return precautions provided. Amount and/or Complexity of Data Reviewed  Labs: ordered. Radiology: ordered and independent interpretation performed. Disposition  [facility] Transfer: No  Final diagnoses:   Fall, initial encounter   Traumatic hematoma of forehead   Right hip pain     Time reflects when diagnosis was documented in both MDM as applicable and the Disposition within this note       Time User Action Codes Description Comment    11/5/2023  9:02 PM Sheldon Santana Add [K21. AHRS] Fall, initial encounter     11/5/2023  9:02 PM Sheldon Santana Add [S00.83XA] Traumatic hematoma of forehead     11/5/2023  9:02 PM Sheldon Santana Add [M25.551] Right hip pain           ED Disposition       ED Disposition   Discharge    Condition   Stable    Date/Time   Sun Nov 5, 2023  9:03 PM    Comment   Ania Sims discharge to home/self care. Follow-up Information       Follow up With Specialties Details Why Contact Info Additional Information    Surekha Brody DO Family Medicine Schedule an appointment as soon as possible for a visit in 2 days for re-evaluation [address] [place] [address] 642 [phone] [facility] Emergency Department Emergency Medicine Go to  If symptoms worsen [address] [place] [address]  [address] [facility] Emergency Department, [address] [facility] [place], [address] [facility]          Patient's Medications   Discharge Prescriptions    No medications on file     No discharge procedures on file.     PDMP Review Value Time User    PDMP Reviewed  Yes 7/11/2021 10:53 AM Taylor Merchant MD            ED Provider  Electronically Signed by           Tamia Howard MD  11/05/23 2113

## 2023-11-16 ENCOUNTER — TELEPHONE (OUTPATIENT)
Dept: NEUROLOGY | Facility: CLINIC | Age: 78
End: 2023-11-16

## 2023-11-16 NOTE — TELEPHONE ENCOUNTER
Tammy Pacheco from [facility] ([phone]) called in today to sche a F/U appt for this pt. Pt was unable to sche prior due to pt being in the Hospital to she is at [facility] so wanted to sche- offered 11/16/23 at 130pm (today)- declined will be able to get pt ready or get there in time- offered next available on 12/28/24 at 10:15am in [facility]- Location address as provided.     PER lov note 07/06/2022-  Instructions  Return in about 5 months (around 12/6/2022) for with Brady Martines PA-C .

## 2023-12-22 ENCOUNTER — TELEPHONE (OUTPATIENT)
Dept: NEUROLOGY | Facility: CLINIC | Age: 78
End: 2023-12-22

## 2023-12-22 NOTE — TELEPHONE ENCOUNTER
MSW attempted to reach patient's daughter regarding transportation/see how patient can travel. No answer at [phone]. MSW left a message requesting callback. Awaiting same. 12/28 appt has already been cancelled.

## 2023-12-22 NOTE — TELEPHONE ENCOUNTER
Pts daughter called to cancel appt on 12/28 with [facility] in [place] due to transportation. They are asking if we can check to see if SW can assist with transportation.   Please call Pts daughter back.   Thank you.

## 2023-12-26 NOTE — TELEPHONE ENCOUNTER
MSW phoned patient's daughter again this date at [phone]. MSW was able to reach patient's daughter Adriana. Patient's daughter stated that patient can get up with and ambulate with a walker but not too well. Patient's daughter said that patient is mostly in a wheelchair. Patient's daughter indicated that patient had a fall last Easter and fractured her right hip. Patient's daughter stated that patient did have a decline in her cognition since coming out of surgery. Patient's daughter indicated that they realized patient could not live at home so she was placed at [facility]. Patient's daughter indicated that patient did have car transfer training recently, but it did not go well otherwise family would just plan to transport patient themselves.    Patient's daughter stated that family recently realized that patient has not seen a neurologist in the last year or so and wondered if any change in her medications would approve her cognitive status.    Patient's daughter stated that they did arrange a transport through the facility previously; however, they had to pay $300 out-of-pocket. Patient's daughter stated that patient has since applied for/was approved for Medicaid at her facility and is currently receiving physical therapy at the facility. Patient's daughter stated that she did reach out to patient's [facility], patient's managed Medicaid plan, and spoke with a  last week who offered to send an application for [facility] for the medical assistance program.    MSW indicated that to this writer's knowledge if a patient is at a skilled nursing facility under Medicaid that it is the facility's responsibility to arrange transportation to/from medical appointments and absorb that cost. MSW advised this writer would want to contact [facility] to confirm this before proceeding. Patient's daughter provided the phone number for [place]  [place] as [phone]. Patient's daughter advised that she normally speaks with Yaneth, .     [facility] at [phone]. MSW asked to speak with Yaneth, but had to leave a voicemail. MSW then called back to [facility] and requested to speak with someone who handles transportation. MSW was put in contact with Alma Delia. MSW inquired if the skilled nursing facility was financially responsible to send up transportation for patient to her medical appointments since she now has Medicaid. Alma Delia advised that she would look into this, and would get back to this writer.     Alma Delia then called this writer back and advise she spoke with the business office who did confirm that they are responsible financially for the cost of transportation for patient to and from her medical appointments. Alma Delia stated that they use a company called [employer]. Alma Delia stated that the family should schedule the appointment based upon their schedule (as she knows patient's family wishes to attend the appointment as well). Alma Delia stated that the family should then let her know the appointment date/time/location and she will set up the transport with [employer]. Alma Delia provided her direct number as [phone], but stated she's not always at her desk, and it would likely be best and easiest for her to be reached by calling the main number at [facility] at [phone].    MSW phoned patient daughter back at [phone] to make her aware of above. Adriana was appreciative and stated that she will call this week to schedule the neurology appointment. MSW offered to provide the number, but Adriana stated that she had access to same.     MSW will follow up in about one week to ensure that there was no issue with the SNF setting up transportation. MSW did encourage patient's daughter to reach out to this writer in the interim, if there is any problems with transportation. Patient's daughter was in agreement with this plan.

## 2023-12-28 ENCOUNTER — OFFICE VISIT (OUTPATIENT)
Dept: NEUROLOGY | Facility: CLINIC | Age: 78
End: 2023-12-28
Payer: MEDICARE

## 2023-12-28 VITALS — DIASTOLIC BLOOD PRESSURE: 60 MMHG | SYSTOLIC BLOOD PRESSURE: 110 MMHG | HEART RATE: 59 BPM

## 2023-12-28 DIAGNOSIS — G20.A1 PARKINSON DISEASE: ICD-10-CM

## 2023-12-28 DIAGNOSIS — G20.A1 COGNITIVE DEFICIT DUE TO PARKINSON'S DISEASE: Primary | ICD-10-CM

## 2023-12-28 PROCEDURE — 99215 OFFICE O/P EST HI 40 MIN: CPT | Performed by: PHYSICIAN ASSISTANT

## 2023-12-28 NOTE — PATIENT INSTRUCTIONS
Patient with Parkinson's disease and Parkinson's disease dementia.  Overall she has had some progression since her last visit with us in 2022.  Both mobility as well as cognition have declined.  She is now in a facility and requires assistance for mostly all of her ADLs.  She is no longer as mobile as in the past and tends to spend more of her time in a wheelchair.  On exam today she is noted to have right greater than left bradykinesia and rigidity.  Unfortunately she is also having some issues with hallucinations however they are not threatening to her at this time.    She has tried increasing her Sinemet dose in the past with complaints of increased dizziness.  We discussed the option of once again trying to increase her dopaminergic replacement to see if this may help with her mobility and function.  She is currently on selegiline in addition to Sinemet and we will have her wean off of this at this time.  Perhaps if she is off of the selegiline we will be able to increase her Sinemet without worsening hallucinations.    At this time she will start by taking selegiline 5 mg 1 tab in the morning only for the next week.  She will then stop the selegiline altogether.    Once off of the selegiline we will try and increase her Sinemet dose further.  She will start by taking Sinemet  mg 1 tab at 9 AM, half tab at 1 PM, 1 tab at 5 PM and half tab at 9 PM.  If this is tolerated after 2 weeks with no worsening of hallucinations or dizziness then we can further increase to Sinemet  mg 1 tab at 9 AM, 1 tab at 1 PM, 1 tab at 5 PM and half tab at 9 PM.    If with this change there is worsening of hallucinations then we need to consider either reducing her Sinemet back to the current dosing versus adding a medication specifically for the hallucinations such as Seroquel or Nuplazid.  We did discuss that there is a black box warning with these medications for sudden cardiac death in elderly patients with  dementia.    She would certainly benefit from continued physical therapy to help with her mobility and function.  This will also allow us to get a better understanding as to whether or not the increased Sinemet is helping from a mobility standpoint.    Cognitive changes continue and have worsened following surgery for a right hip fracture in April 2022.  In the past she had side effects with Aricept.  We did discuss that there is another medication called for memory called Namenda.  Potential side effects include dizziness, nausea, diarrhea and increased confusion.  At this time we will hold off on any new medications for memory and we will instead focus on her mobility.    Patient was encouraged to increase mind stimulating activities such as reading, crosswords, word searches, puzzles, Soduku, solitaire, coloring and other brain games.  We also discussed the importance of staying physically active and eating a health diet such as the Mediterranean or MIND diet.

## 2023-12-28 NOTE — PROGRESS NOTES
Patient ID: Sonam Barger is a 78 y.o. female.    Assessment/Plan:    Parkinson disease (HCC)  Patient with Parkinson's disease and Parkinson's disease dementia.  Overall she has had some progression since her last visit with us in 2022.  Both mobility as well as cognition have declined.  She is now in a facility and requires assistance for mostly all of her ADLs.  She is no longer as mobile as in the past and tends to spend more of her time in a wheelchair.  On exam today she is noted to have right greater than left bradykinesia and rigidity.  Unfortunately she is also having some issues with hallucinations however they are not threatening to her at this time.    She has tried increasing her Sinemet dose in the past with complaints of increased dizziness.  We discussed the option of once again trying to increase her dopaminergic replacement to see if this may help with her mobility and function.  She is currently on selegiline in addition to Sinemet and we will have her wean off of this at this time.  Perhaps if she is off of the selegiline we will be able to increase her Sinemet without worsening hallucinations.    At this time she will start by taking selegiline 5 mg 1 tab in the morning only for the next week.  She will then stop the selegiline altogether.    Once off of the selegiline we will try and increase her Sinemet dose further.  She will start by taking Sinemet  mg 1 tab at 9 AM, half tab at 1 PM, 1 tab at 5 PM and half tab at 9 PM.  If this is tolerated after 2 weeks with no worsening of hallucinations or dizziness then we can further increase to Sinemet  mg 1 tab at 9 AM, 1 tab at 1 PM, 1 tab at 5 PM and half tab at 9 PM.    If with this change there is worsening of hallucinations then we need to consider either reducing her Sinemet back to the current dosing versus adding a medication specifically for the hallucinations such as Seroquel or Nuplazid.  We did discuss that there is a black  box warning with these medications for sudden cardiac death in elderly patients with dementia.    She would certainly benefit from continued physical therapy to help with her mobility and function.  This will also allow us to get a better understanding as to whether or not the increased Sinemet is helping from a mobility standpoint.    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 (HCC)  Cognitive changes continue and have worsened following surgery for a right hip fracture in April 2022.  In the past she had side effects with Aricept.  We did discuss that there is another medication called for memory called Namenda.  Potential side effects include dizziness, nausea, diarrhea and increased confusion.  At this time we will hold off on any new medications for memory and we will instead focus on her mobility.    Patient was encouraged to increase mind stimulating activities such as reading, crosswords, word searches, puzzles, Soduku, solitaire, coloring and other brain games.  We also discussed the importance of staying physically active and eating a health diet such as the Mediterranean or MIND diet.          Subjective:    Ms. Barger is a right handed woman who presents for Movement follow up for Parkinson's disease. She was previous followed by Dr. Marie.  Review of his notes and chart reveal symptoms began with right arm tremor in 2016. Frist seen on Azilect with asymmetric rigidity R>L, rest tremor bradykinesia with decrement. Last seen September 2020 with her daughter having raise some concerns regarding her cognition and her driving ability. Ms. Barger was to hold off on driving until completing a fitness to drive evaluation. Aricept was started. She called stating Aricept caused increase tremor and she discontinued it. FTD evaluation was never completed.     At her last visit she was noted to have some progression and her Sinemet dose was increased. It was felt that side effects to memory medication outweighed  potential benefit and no further medication started.     INTERVAL HISTORY:  She has had overall decline since her last visit in   She had a fall over 2022 and fractured her right hip requiring surgery   Cognitive decline following surgery   She is no longer as mobile and spends most of her time in a wheel chair   She is now at [facility]   She continues to struggle with balance, shuffles and requires assistance   Per the  she had complaints of dizziness when on higher doses of Sinemet in the past   She can walk to the bathroom with a walker   She gets assistance with dressing and showering   She can eat on her own at times, other times she may need some help   No issues with swallowing however they have talked about switching her to soft foods per speech therapy   Medications are managed, she will get them in applesauce   She does have some trouble with keeping foods in her mouth instead of swallowing   She sleeps well at night   She does have hallucinations, she may see people such as  family members and animals   Hallucinations are not scary or threatening to her however she may get worried about the things she may see   She can be redirected at times other times this can be difficult   She does have some agitation with certain aides     Current relevant medication:  Sinemet 25/100 1/2 tab q9am, 1pm, 5pm and 9pm   Selegeline 5mg bid      Prior medications:  Aricept - caused increased tremor   Azilect - switched to selegeline due to cost         I personally reviewed and updated the ROS.    I have spent a total time of 45 minutes on 23 in caring for this patient including Prognosis, Risks and benefits of tx options, Instructions for management, Patient and family education, Counseling / Coordination of care, and Obtaining or reviewing history  .       Objective:    Blood pressure 110/60, pulse 59, not currently breastfeeding.    Physical Exam  Eyes:      Extraocular  Movements: Extraocular movements intact.      Pupils: Pupils are equal, round, and reactive to light.   Pulmonary:      Effort: Pulmonary effort is normal.   Neurological:      Mental Status: She is alert.      Motor: Motor strength is normal.        Neurological Exam  Mental Status  Alert. Oriented only to person and situation. Speech: hypophonia. Able to name objects.  Sitting in wheel chair   Able to answer simple questions, confusion at times   Platinum, had to repeat myself   Able to follow commands .    Cranial Nerves  CN III, IV, VI: Extraocular movements intact bilaterally. Pupils equal round and reactive to light bilaterally.  CN V:  Right: Facial sensation is normal.  Left: Facial sensation is normal on the left.  CN VII:  Right: There is no facial weakness.  Left: There is no facial weakness.  CN VIII:  Right: Hearing is decreased.  Left: Hearing is decreased.  CN IX, X: Palate elevates symmetrically  CN XI: Shoulder shrug strength is normal.  CN XII: Tongue midline without atrophy or fasciculations.    Motor   Increased muscle tone. Strength is 5/5 throughout all four extremities.    Sensory  Light touch is normal in upper and lower extremities.     Coordination  Right: Finger-to-nose normal. Rapid alternating movement abnormality:Left: Finger-to-nose normal. Rapid alternating movement abnormality:  See MDS UPDRS III.    Gait  Casual gait: Unable to rise from chair without using arms.  Not formally ambulated .    UPDRS motor:                              Time since last dose:   12/28/23 7/6/22   Speech   1 1   Facial Expression   2 2   Rigidity - Neck    2   Rigidity - Upper Extremity (Right)   2 2   Rigidity - Upper Extremity (Left)    1 1   Rigidity - Lower Extremity (Right)   1 2   Rigidity - Lower Extremity (Left)    1 2   Finger Taps (Right)    2 2   Finger Taps (Left)    2 2   Hand Movement (Right)   2 2   Hand Movement (Left)    1 1   Pronation/Supination (Right)   2 2   Pronation/Supination (Left)     1 2   Toe Tapping (Right)  2 3   Toe Tapping (Left)  2 3   Leg Agility (Right)   2 2   Leg Agility (Left)    2 2   Arising from Chair     2   Gait     2   Freezing of Gait   0   Postural Stability         Posture   2   Global spontaneity of movement  2 2   Postural Tremor (Right)  0 0   Postural Tremor (Left)  0 0   Kinetic Tremor (Right)   0 0   Kinetic Tremor (Left)   0 0   Rest tremor amplitude RUE  1 1   Rest tremor amplitude LUE  0 0   Rest tremor amplitude RLE  0 1   Reset tremor amplitude LLE  0 1   Lip/Jaw Tremor   0 1   Consistency of tremor  1 2   Motor Exam Total:            ROS:    Review of Systems   Constitutional:  Positive for fatigue. Negative for appetite change and fever.   HENT:  Positive for trouble swallowing and voice change. Negative for hearing loss and tinnitus.    Eyes: Negative.  Negative for photophobia, pain and visual disturbance.   Respiratory: Negative.  Negative for shortness of breath.    Cardiovascular: Negative.  Negative for palpitations.   Gastrointestinal: Negative.  Negative for nausea and vomiting.   Endocrine: Negative.  Negative for cold intolerance.   Genitourinary: Negative.  Negative for dysuria, frequency, menstrual problem and urgency.   Musculoskeletal:  Positive for gait problem (Doesnot walk much anymore , but if she attempts to she shuffles feet). Negative for back pain, myalgias, neck pain and neck stiffness.        Balance Issues, has gotten worse     Skin: Negative.  Negative for rash.   Allergic/Immunologic: Negative.    Neurological:  Positive for tremors (Once in a while in arms and legs), speech difficulty and weakness (Arms and Legs). Negative for dizziness, seizures, syncope, facial asymmetry, light-headedness, numbness and headaches.   Hematological:  Bruises/bleeds easily.   Psychiatric/Behavioral:  Positive for confusion and hallucinations. Negative for sleep disturbance.         Memory Issues      All other systems reviewed and are  negative.

## 2023-12-28 NOTE — ASSESSMENT & PLAN NOTE
Patient with Parkinson's disease and Parkinson's disease dementia.  Overall she has had some progression since her last visit with us in 2022.  Both mobility as well as cognition have declined.  She is now in a facility and requires assistance for mostly all of her ADLs.  She is no longer as mobile as in the past and tends to spend more of her time in a wheelchair.  On exam today she is noted to have right greater than left bradykinesia and rigidity.  Unfortunately she is also having some issues with hallucinations however they are not threatening to her at this time.    She has tried increasing her Sinemet dose in the past with complaints of increased dizziness.  We discussed the option of once again trying to increase her dopaminergic replacement to see if this may help with her mobility and function.  She is currently on selegiline in addition to Sinemet and we will have her wean off of this at this time.  Perhaps if she is off of the selegiline we will be able to increase her Sinemet without worsening hallucinations.    At this time she will start by taking selegiline 5 mg 1 tab in the morning only for the next week.  She will then stop the selegiline altogether.    Once off of the selegiline we will try and increase her Sinemet dose further.  She will start by taking Sinemet  mg 1 tab at 9 AM, half tab at 1 PM, 1 tab at 5 PM and half tab at 9 PM.  If this is tolerated after 2 weeks with no worsening of hallucinations or dizziness then we can further increase to Sinemet  mg 1 tab at 9 AM, 1 tab at 1 PM, 1 tab at 5 PM and half tab at 9 PM.    If with this change there is worsening of hallucinations then we need to consider either reducing her Sinemet back to the current dosing versus adding a medication specifically for the hallucinations such as Seroquel or Nuplazid.  We did discuss that there is a black box warning with these medications for sudden cardiac death in elderly patients with  dementia.    She would certainly benefit from continued physical therapy to help with her mobility and function.  This will also allow us to get a better understanding as to whether or not the increased Sinemet is helping from a mobility standpoint.

## 2023-12-28 NOTE — PROGRESS NOTES
Patient ID: Sonam Barger is a 78 y.o. female.    Assessment/Plan:    Parkinson disease (HCC)  Patient with Parkinson's disease and Parkinson's disease dementia.  Overall she has had some progression since her last visit with us in 2022.  Both mobility as well as cognition have declined.  She is now in a facility and requires assistance for mostly all of her ADLs.  She is no longer as mobile as in the past and tends to spend more of her time in a wheelchair.  On exam today she is noted to have right greater than left bradykinesia and rigidity.  Unfortunately she is also having some issues with hallucinations however they are not threatening to her at this time.    She has tried increasing her Sinemet dose in the past with complaints of increased dizziness.  We discussed the option of once again trying to increase her dopaminergic replacement to see if this may help with her mobility and function.  She is currently on selegiline in addition to Sinemet and we will have her wean off of this at this time.  Perhaps if she is off of the selegiline we will be able to increase her Sinemet without worsening hallucinations.    At this time she will start by taking selegiline 5 mg 1 tab in the morning only for the next week.  She will then stop the selegiline altogether.    Once off of the selegiline we will try and increase her Sinemet dose further.  She will start by taking Sinemet  mg 1 tab at 9 AM, half tab at 1 PM, 1 tab at 5 PM and half tab at 9 PM.  If this is tolerated after 2 weeks with no worsening of hallucinations or dizziness then we can further increase to Sinemet  mg 1 tab at 9 AM, 1 tab at 1 PM, 1 tab at 5 PM and half tab at 9 PM.    If with this change there is worsening of hallucinations then we need to consider either reducing her Sinemet back to the current dosing versus adding a medication specifically for the hallucinations such as Seroquel or Nuplazid.  We did discuss that there is a black  box warning with these medications for sudden cardiac death in elderly patients with dementia.    She would certainly benefit from continued physical therapy to help with her mobility and function.  This will also allow us to get a better understanding as to whether or not the increased Sinemet is helping from a mobility standpoint.    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 (HCC)  Cognitive changes continue and have worsened following surgery for a right hip fracture in April 2022.  In the past she had side effects with Aricept.  We did discuss that there is another medication called for memory called Namenda.  Potential side effects include dizziness, nausea, diarrhea and increased confusion.  At this time we will hold off on any new medications for memory and we will instead focus on her mobility.    Patient was encouraged to increase mind stimulating activities such as reading, crosswords, word searches, puzzles, Soduku, solitaire, coloring and other brain games.  We also discussed the importance of staying physically active and eating a health diet such as the Mediterranean or MIND diet.          Subjective:    Ms. Barger is a right handed woman who presents for Movement follow up for Parkinson's disease. She was previous followed by Dr. Marie.  Review of his notes and chart reveal symptoms began with right arm tremor in 2016. Frist seen on Azilect with asymmetric rigidity R>L, rest tremor bradykinesia with decrement. Last seen September 2020 with her daughter having raise some concerns regarding her cognition and her driving ability. Ms. Barger was to hold off on driving until completing a fitness to drive evaluation. Aricept was started. She called stating Aricept caused increase tremor and she discontinued it. FTD evaluation was never completed.     At her last visit she was noted to have some progression and her Sinemet dose was increased. It was felt that side effects to memory medication outweighed  potential benefit and no further medication started.     INTERVAL HISTORY:  She has had overall decline since her last visit in   She had a fall over 2022 and fractured her right hip requiring surgery   Cognitive decline following surgery   She is no longer as mobile and spends most of her time in a wheel chair   She is now at [facility]   She continues to struggle with balance, shuffles and requires assistance   Per the  she had complaints of dizziness when on higher doses of Sinemet in the past   She can walk to the bathroom with a walker   She gets assistance with dressing and showering   She can eat on her own at times, other times she may need some help   No issues with swallowing however they have talked about switching her to soft foods per speech therapy   Medications are managed, she will get them in applesauce   She does have some trouble with keeping foods in her mouth instead of swallowing   She sleeps well at night   She does have hallucinations, she may see people such as  family members and animals   Hallucinations are not scary or threatening to her however she may get worried about the things she may see   She can be redirected at times other times this can be difficult   She does have some agitation with certain aides     Current relevant medication:  Sinemet 25/100 1/2 tab q9am, 1pm, 5pm and 9pm   Selegeline 5mg bid      Prior medications:  Aricept - caused increased tremor   Azilect - switched to selegeline due to cost         I personally reviewed and updated the ROS.    I have spent a total time of 45 minutes on 23 in caring for this patient including Prognosis, Risks and benefits of tx options, Instructions for management, Patient and family education, Counseling / Coordination of care, and Obtaining or reviewing history  .       Objective:    Blood pressure 110/60, pulse 59, not currently breastfeeding.    Physical Exam  Eyes:      Extraocular  Movements: Extraocular movements intact.      Pupils: Pupils are equal, round, and reactive to light.   Pulmonary:      Effort: Pulmonary effort is normal.   Neurological:      Mental Status: She is alert.      Motor: Motor strength is normal.        Neurological Exam  Mental Status  Alert. Oriented only to person and situation. Speech: hypophonia. Able to name objects.  Sitting in wheel chair   Able to answer simple questions, confusion at times   [place], had to repeat myself   Able to follow commands .    Cranial Nerves  CN III, IV, VI: Extraocular movements intact bilaterally. Pupils equal round and reactive to light bilaterally.  CN V:  Right: Facial sensation is normal.  Left: Facial sensation is normal on the left.  CN VII:  Right: There is no facial weakness.  Left: There is no facial weakness.  CN VIII:  Right: Hearing is decreased.  Left: Hearing is decreased.  CN IX, X: Palate elevates symmetrically  CN XI: Shoulder shrug strength is normal.  CN XII: Tongue midline without atrophy or fasciculations.    Motor   Increased muscle tone. Strength is 5/5 throughout all four extremities.    Sensory  Light touch is normal in upper and lower extremities.     Coordination  Right: Finger-to-nose normal. Rapid alternating movement abnormality:Left: Finger-to-nose normal. Rapid alternating movement abnormality:  See MDS UPDRS III.    Gait  Casual gait: Unable to rise from chair without using arms.  Not formally ambulated .    UPDRS motor:                              Time since last dose:   12/28/23 7/6/22   Speech   1 1   Facial Expression   2 2   Rigidity - Neck    2   Rigidity - Upper Extremity (Right)   2 2   Rigidity - Upper Extremity (Left)    1 1   Rigidity - Lower Extremity (Right)   1 2   Rigidity - Lower Extremity (Left)    1 2   Finger Taps (Right)    2 2   Finger Taps (Left)    2 2   Hand Movement (Right)   2 2   Hand Movement (Left)    1 1   Pronation/Supination (Right)   2 2   Pronation/Supination (Left)     1 2   Toe Tapping (Right)  2 3   Toe Tapping (Left)  2 3   Leg Agility (Right)   2 2   Leg Agility (Left)    2 2   Arising from Chair     2   Gait     2   Freezing of Gait   0   Postural Stability         Posture   2   Global spontaneity of movement  2 2   Postural Tremor (Right)  0 0   Postural Tremor (Left)  0 0   Kinetic Tremor (Right)   0 0   Kinetic Tremor (Left)   0 0   Rest tremor amplitude RUE  1 1   Rest tremor amplitude LUE  0 0   Rest tremor amplitude RLE  0 1   Reset tremor amplitude LLE  0 1   Lip/Jaw Tremor   0 1   Consistency of tremor  1 2   Motor Exam Total:            ROS:    Review of Systems   Constitutional:  Positive for fatigue. Negative for appetite change and fever.   HENT:  Positive for trouble swallowing and voice change. Negative for hearing loss and tinnitus.    Eyes: Negative.  Negative for photophobia, pain and visual disturbance.   Respiratory: Negative.  Negative for shortness of breath.    Cardiovascular: Negative.  Negative for palpitations.   Gastrointestinal: Negative.  Negative for nausea and vomiting.   Endocrine: Negative.  Negative for cold intolerance.   Genitourinary: Negative.  Negative for dysuria, frequency, menstrual problem and urgency.   Musculoskeletal:  Positive for gait problem (Doesnot walk much anymore , but if she attempts to she shuffles feet). Negative for back pain, myalgias, neck pain and neck stiffness.        Balance Issues, has gotten worse     Skin: Negative.  Negative for rash.   Allergic/Immunologic: Negative.    Neurological:  Positive for tremors (Once in a while in arms and legs), speech difficulty and weakness (Arms and Legs). Negative for dizziness, seizures, syncope, facial asymmetry, light-headedness, numbness and headaches.   Hematological:  Bruises/bleeds easily.   Psychiatric/Behavioral:  Positive for confusion and hallucinations. Negative for sleep disturbance.         Memory Issues      All other systems reviewed and are  negative.

## 2023-12-28 NOTE — ASSESSMENT & PLAN NOTE
Cognitive changes continue and have worsened following surgery for a right hip fracture in April 2022.  In the past she had side effects with Aricept.  We did discuss that there is another medication called for memory called Namenda.  Potential side effects include dizziness, nausea, diarrhea and increased confusion.  At this time we will hold off on any new medications for memory and we will instead focus on her mobility.    Patient was encouraged to increase mind stimulating activities such as reading, crosswords, word searches, puzzles, Soduku, solitaire, coloring and other brain games.  We also discussed the importance of staying physically active and eating a health diet such as the Mediterranean or MIND diet.

## 2024-03-25 ENCOUNTER — TELEPHONE (OUTPATIENT)
Dept: FAMILY MEDICINE CLINIC | Facility: CLINIC | Age: 79
End: 2024-03-25

## 2024-03-29 ENCOUNTER — TELEPHONE (OUTPATIENT)
Dept: FAMILY MEDICINE CLINIC | Facility: CLINIC | Age: 79
End: 2024-03-29

## 2024-03-29 DIAGNOSIS — G20.A1 PARKINSON'S DISEASE, UNSPECIFIED WHETHER DYSKINESIA PRESENT, UNSPECIFIED WHETHER MANIFESTATIONS FLUCTUATE: Primary | ICD-10-CM

## 2024-03-29 NOTE — TELEPHONE ENCOUNTER
Order placed. We have not seen pt in over a year though so if we could get her scheduled for an AWV that would be great!

## 2024-03-29 NOTE — TELEPHONE ENCOUNTER
Karuna from Neurology called to get an ambulatory referral for her Parkinsons -DX G20.A1  Her appt is 4/4/24  Thank you   Initial (On Arrival)

## 2024-04-04 ENCOUNTER — OFFICE VISIT (OUTPATIENT)
Dept: NEUROLOGY | Facility: CLINIC | Age: 79
End: 2024-04-04
Payer: MEDICARE

## 2024-04-04 VITALS — SYSTOLIC BLOOD PRESSURE: 124 MMHG | HEART RATE: 60 BPM | DIASTOLIC BLOOD PRESSURE: 62 MMHG

## 2024-04-04 DIAGNOSIS — G20.A1 PARKINSON'S DISEASE, UNSPECIFIED WHETHER DYSKINESIA PRESENT, UNSPECIFIED WHETHER MANIFESTATIONS FLUCTUATE: ICD-10-CM

## 2024-04-04 DIAGNOSIS — G20.A1 COGNITIVE DEFICIT DUE TO PARKINSON'S DISEASE: Primary | ICD-10-CM

## 2024-04-04 PROCEDURE — 99215 OFFICE O/P EST HI 40 MIN: CPT | Performed by: PHYSICIAN ASSISTANT

## 2024-04-04 PROCEDURE — G2211 COMPLEX E/M VISIT ADD ON: HCPCS | Performed by: PHYSICIAN ASSISTANT

## 2024-04-04 RX ORDER — LANOLIN ALCOHOL/MO/W.PET/CERES
3 CREAM (GRAM) TOPICAL
Qty: 90 TABLET | Refills: 3 | Status: SHIPPED | OUTPATIENT
Start: 2024-04-04

## 2024-04-04 RX ORDER — SODIUM PHOSPHATE, DIBASIC AND SODIUM PHOSPHATE, MONOBASIC 3.5; 9.5 G/66ML; G/66ML
1 ENEMA RECTAL ONCE
COMMUNITY

## 2024-04-04 RX ORDER — CITALOPRAM 20 MG/1
20 TABLET ORAL DAILY
COMMUNITY

## 2024-04-04 RX ORDER — BISACODYL 10 MG
10 SUPPOSITORY, RECTAL RECTAL DAILY
COMMUNITY

## 2024-04-04 NOTE — PROGRESS NOTES
Patient ID: Sonam Barger is a 78 y.o. female.    Assessment/Plan:    Parkinson disease (HCC)  Patient with Parkinson's disease and Parkinson's disease dementia.  Overall she has been doing well since her last visit.  There was not any significant improvement of her mobility with increased Sinemet dosing however no worsening of hallucinations.  Her exam is overall stable when compared to the last visit.  She does continue to have some occasional hallucinations however these are not threatening to her at this time.  For now we will have her remain on her current dosing of Sinemet, 1 tab at 9 AM, 1 tab at 1 PM, 1 tab at 5 PM and half tab at 9 PM.  She was also encouraged to continue with her exercises.  Per the  she is currently in physical therapy.    One of the family's main concerns is difficulty with her sleep-wake cycle.  She is starting to sleep more during the day and is then awake at night.  We had a long discussion in regards to the importance of keeping her mentally and physically stimulated during the day to avoid sleep.  In her facility it may be of benefit for them to take her out of her room and keep her in the halls where she can be more stimulated.  We will also have her start a low-dose of melatonin before bed to see if this may help.  She can start with melatonin 3 mg about an hour before bedtime.  This dose can certainly be increased further if needed.          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 (HCC)  She does continue to have episodes of confusion secondary to her underlying dementia.  There has been some increased confusion recently and per the  they are testing for a UTI.  This could certainly be contributing to any worsening cognitive issues if present.  In the past unfortunately she had side effects with Aricept for her dementia.  Could consider a trial of Namenda in the future however it is likely that this would provide limited benefit.  Would recommend that she  continue to try and keep mentally stimulated at her facility.  Nurses/aides should continue to take her out to various activities.            Subjective:    Ms. Barger is a right handed woman who presents for Movement follow up for Parkinson's disease. She was previous followed by Dr. Marie.  Review of his notes and chart reveal symptoms began with right arm tremor in 2016. Frist seen on Azilect with asymmetric rigidity R>L, rest tremor bradykinesia with decrement. Last seen September 2020 with her daughter having raise some concerns regarding her cognition and her driving ability. Ms. Barger was to hold off on driving until completing a fitness to drive evaluation. Aricept was started. She called stating Aricept caused increase tremor and she discontinued it. FTD evaluation was never completed.      At her last visit she had some overall progression.  Selegeline was weaned off and her Sinemet dose was increased. Discussed Namenda however this was not started given making other medication changes.      INTERVAL HISTORY:  Overall seems stable   No real improvement with the increased Sinemet dose   No hallucinations with this increase   She has been having some increased confusion and they just tested her for UTI, results not back   She will get up in the middle of the night and walk around   She resides at [facility]   She is overall more confused in the evening   She does still hallucinations, not threatening or scary to her   She does require assistance with dressing and showering   She walks with a walker   She is in PT currently   She has a great appetite   No issues with swallowing, she is on a soft food diet   She does participate in some of the activities   She may have some falls when she tries to get up in the middle of the night on her own   She had one dose of Seroquel at the facility, this caused her more confusion     Current relevant medication:  Sinemet  mg 1 tab at 9 AM, 1 tab at 1 PM, 1  tab at 5 PM and half tab at 9 PM.    Prior medications:  Aricept - caused increased tremor   Azilect - switched to selegeline due to cost  Selegeline 5mg bid - stopped due to hallucinations       I personally reviewed and updated the ROS.    I have spent a total time of 45 minutes on 04/04/24 in caring for this patient including Prognosis, Instructions for management, Patient and family education, Impressions, Counseling / Coordination of care, and Documenting in the medical record.         Objective:    Blood pressure 124/62, pulse 60, not currently breastfeeding.    Physical Exam  Constitutional:       Appearance: Normal appearance.   Eyes:      Extraocular Movements: Extraocular movements intact.      Pupils: Pupils are equal, round, and reactive to light.   Pulmonary:      Effort: Pulmonary effort is normal.   Neurological:      Mental Status: She is alert.      Motor: Motor strength is normal.        Neurological Exam  Mental Status  Alert. Oriented only to person and situation. Speech: hypophonia. Able to name objects.  Sitting in wheel chair   Able to answer simple questions, confusion at times   Able to follow simple commands .    Cranial Nerves  CN III, IV, VI: Extraocular movements intact bilaterally. Pupils equal round and reactive to light bilaterally.  CN V:  Right: Facial sensation is normal.  Left: Facial sensation is normal on the left.  CN VII:  Right: There is no facial weakness.  Left: There is no facial weakness.  CN VIII:  Right: Hearing is decreased.  Left: Hearing is decreased.  CN IX, X: Palate elevates symmetrically  CN XI: Shoulder shrug strength is normal.  CN XII: Tongue midline without atrophy or fasciculations.    Motor   Increased muscle tone. Strength is 5/5 throughout all four extremities.    Sensory  Light touch is normal in upper and lower extremities.     Coordination  Right: Finger-to-nose normal. Rapid alternating movement abnormality:Left: Finger-to-nose normal. Rapid  alternating movement abnormality:  See MDS UPDRS III.    Gait  Casual gait: Unable to rise from chair without using arms.  Not formally ambulated .    UPDRS motor:                              Time since last dose:   12/28/23 4/4/24   Speech   1 1   Facial Expression   2 2   Rigidity - Neck    2   Rigidity - Upper Extremity (Right)   2 2   Rigidity - Upper Extremity (Left)    1 1   Rigidity - Lower Extremity (Right)   1 2   Rigidity - Lower Extremity (Left)    1 2   Finger Taps (Right)    2 2   Finger Taps (Left)    2 2   Hand Movement (Right)   2 2   Hand Movement (Left)    1 1   Pronation/Supination (Right)   2 2   Pronation/Supination (Left)    1 2   Toe Tapping (Right)  2 3   Toe Tapping (Left)  2 3   Leg Agility (Right)   2 2   Leg Agility (Left)    2 2   Arising from Chair        Gait        Freezing of Gait      Postural Stability         Posture      Global spontaneity of movement  2 2   Postural Tremor (Right)  0 0   Postural Tremor (Left)  0 0   Kinetic Tremor (Right)   0 0   Kinetic Tremor (Left)   0 0   Rest tremor amplitude RUE  1 1   Rest tremor amplitude LUE  0 0   Rest tremor amplitude RLE  0 1   Reset tremor amplitude LLE  0 1   Lip/Jaw Tremor   0 1   Consistency of tremor  1 2   Motor Exam Total:            ROS:    Review of Systems   Constitutional:  Negative for appetite change, fatigue and fever.   HENT: Negative.  Negative for hearing loss, tinnitus, trouble swallowing and voice change.    Eyes: Negative.  Negative for photophobia, pain and visual disturbance.   Respiratory: Negative.  Negative for shortness of breath.    Cardiovascular: Negative.  Negative for palpitations.   Gastrointestinal: Negative.  Negative for nausea and vomiting.   Endocrine: Negative.  Negative for cold intolerance.   Genitourinary: Negative.  Negative for dysuria, frequency and urgency.   Musculoskeletal:  Positive for gait problem (Shuffles feet , uses walker, and doesnt usually walk much). Negative for back pain,  myalgias, neck pain and neck stiffness.        Balance Issues , has stayed about the same     Skin: Negative.  Negative for rash.   Allergic/Immunologic: Negative.    Neurological:  Positive for tremors (Hands, states it is situational, such as when around a lot of people it will increase) and weakness (A little bit). Negative for dizziness, seizures, syncope, facial asymmetry, speech difficulty, light-headedness, numbness and headaches.   Hematological:  Bruises/bleeds easily (Bruise).   Psychiatric/Behavioral:  Positive for confusion (has good days and bad days) and sleep disturbance (not sleeping well, sleeps during the day but not night). Negative for hallucinations.    All other systems reviewed and are negative.

## 2024-04-04 NOTE — ASSESSMENT & PLAN NOTE
Patient with Parkinson's disease and Parkinson's disease dementia.  Overall she has been doing well since her last visit.  There was not any significant improvement of her mobility with increased Sinemet dosing however no worsening of hallucinations.  Her exam is overall stable when compared to the last visit.  She does continue to have some occasional hallucinations however these are not threatening to her at this time.  For now we will have her remain on her current dosing of Sinemet, 1 tab at 9 AM, 1 tab at 1 PM, 1 tab at 5 PM and half tab at 9 PM.  She was also encouraged to continue with her exercises.  Per the  she is currently in physical therapy.    One of the family's main concerns is difficulty with her sleep-wake cycle.  She is starting to sleep more during the day and is then awake at night.  We had a long discussion in regards to the importance of keeping her mentally and physically stimulated during the day to avoid sleep.  In her facility it may be of benefit for them to take her out of her room and keep her in the halls where she can be more stimulated.  We will also have her start a low-dose of melatonin before bed to see if this may help.  She can start with melatonin 3 mg about an hour before bedtime.  This dose can certainly be increased further if needed.

## 2024-04-04 NOTE — PATIENT INSTRUCTIONS
Patient with Parkinson's disease and Parkinson's disease dementia.  Overall she has been doing well since her last visit.  There was not any significant improvement of her mobility with increased Sinemet dosing however no worsening of hallucinations.  Her exam is overall stable when compared to the last visit.  She does continue to have some occasional hallucinations however these are not threatening to her at this time.  For now we will have her remain on her current dosing of Sinemet, 1 tab at 9 AM, 1 tab at 1 PM, 1 tab at 5 PM and half tab at 9 PM.  She was also encouraged to continue with her exercises.  Per the  she is currently in physical therapy.    One of the family's main concerns is difficulty with her sleep-wake cycle.  She is starting to sleep more during the day and is then awake at night.  We had a long discussion in regards to the importance of keeping her mentally and physically stimulated during the day to avoid sleep.  In her facility it may be of benefit for them to take her out of her room and keep her in the halls where she can be more stimulated.  We will also have her start a low-dose of melatonin before bed to see if this may help.  She can start with melatonin 3 mg about an hour before bedtime.  This dose can certainly be increased further if needed.    She does continue to have episodes of confusion secondary to her underlying dementia.  There has been some increased confusion recently and per the  they are testing for a UTI.  This could certainly be contributing to any worsening cognitive issues if present.  In the past unfortunately she had side effects with Aricept for her dementia.  Could consider a trial of Namenda in the future however it is likely that this would provide limited benefit.  Would recommend that she continue to try and keep mentally stimulated at her facility.  Nurses/aides should continue to take her out to various activities.

## 2024-04-04 NOTE — ASSESSMENT & PLAN NOTE
She does continue to have episodes of confusion secondary to her underlying dementia.  There has been some increased confusion recently and per the  they are testing for a UTI.  This could certainly be contributing to any worsening cognitive issues if present.  In the past unfortunately she had side effects with Aricept for her dementia.  Could consider a trial of Namenda in the future however it is likely that this would provide limited benefit.  Would recommend that she continue to try and keep mentally stimulated at her facility.  Nurses/aides should continue to take her out to various activities.

## 2024-07-25 ENCOUNTER — OFFICE VISIT (OUTPATIENT)
Dept: NEUROLOGY | Facility: CLINIC | Age: 79
End: 2024-07-25
Payer: MEDICARE

## 2024-07-25 VITALS — SYSTOLIC BLOOD PRESSURE: 126 MMHG | DIASTOLIC BLOOD PRESSURE: 78 MMHG

## 2024-07-25 DIAGNOSIS — R44.3 HALLUCINATION: ICD-10-CM

## 2024-07-25 DIAGNOSIS — G47.9 SLEEP DISORDER: ICD-10-CM

## 2024-07-25 DIAGNOSIS — G20.A1 PARKINSON'S DISEASE WITHOUT DYSKINESIA, UNSPECIFIED WHETHER MANIFESTATIONS FLUCTUATE: Primary | ICD-10-CM

## 2024-07-25 PROBLEM — S22.20XA UNSPECIFIED FRACTURE OF STERNUM, INITIAL ENCOUNTER FOR CLOSED FRACTURE: Status: RESOLVED | Noted: 2023-01-10 | Resolved: 2024-07-25
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
## 2024-07-25 PROBLEM — Z47.89 AFTERCARE FOLLOWING SURGERY OF THE MUSCULOSKELETAL SYSTEM: Status: RESOLVED | Noted: 2023-04-25 | Resolved: 2024-07-25

## 2024-07-25 PROCEDURE — 99215 OFFICE O/P EST HI 40 MIN: CPT | Performed by: PSYCHIATRY & NEUROLOGY

## 2024-07-25 NOTE — ASSESSMENT & PLAN NOTE
Progression in parkinsonian symptoms with increased rigidity, tremor and bradykinesia. Although she would benefit from increase dopaminergic medication but this may worsen hallucinations which has been an issues.   Recommend keeping her on current dose of carbidopa/levodopa 1 tab at 9am, 1pm and 5pm and 1/2 tab at night.  Recommend ROM exercises to lower extremities to prevent  contractures and ambulating daily.

## 2024-07-25 NOTE — PATIENT INSTRUCTIONS
Progression in parkinsonian symptoms with increased rigidity, tremor and bradykinesia. Although she would benefit from increase dopaminergic medication but this may worsen hallucinations which has been an issues.   Recommend keeping her on current dose of carbidopa/levodopa 1 tab at 9am, 1pm and 5pm and 1/2 tab at night.  Recommend ROM exercises to lower extremities to prevent  contractures and ambulating daily.      Discussed treatment options for hallucinations including retrial of quetiapine versus  pimvaserin (Nuplazid)   At this point given boxed warning family is not comfortable and patient denies feeling threatened by hallucinations. Will continue to monitor. If worsens we can reconsider.  Time spent discussing the use of Nuplazid for PD hallucinations. We discussed the boxed warning of increased risk of death with the use of antipsychotics in the elderly with dementia.      Sleep impairment  with symptoms of REM sleep behavior disorder. May benefit from increase in melatonin to 5mg nightly and if no improvement in 2-3 weeks can further increase to 10mg nightly.

## 2024-07-25 NOTE — ASSESSMENT & PLAN NOTE
Discussed treatment options for hallucinations including retrial of quetiapine versus  pimvaserin (Nuplazid)   At this point given boxed warning family is not comfortable and patient denies feeling threatened by hallucinations. Will continue to monitor. If worsens we can reconsider.  Time spent discussing the use of Nuplazid for PD hallucinations. We discussed the boxed warning of increased risk of death with the use of antipsychotics in the elderly with dementia.

## 2024-07-25 NOTE — PROGRESS NOTES
Review of Systems   Constitutional:  Negative for appetite change, fatigue and fever.   HENT: Negative.  Negative for hearing loss, tinnitus, trouble swallowing and voice change.    Eyes: Negative.  Negative for photophobia, pain and visual disturbance.   Respiratory: Negative.  Negative for shortness of breath.    Cardiovascular: Negative.  Negative for palpitations.   Gastrointestinal: Negative.  Negative for nausea and vomiting.   Endocrine: Negative.  Negative for cold intolerance.   Genitourinary: Negative.  Negative for dysuria, frequency and urgency.   Musculoskeletal:  Positive for back pain. Negative for gait problem, myalgias, neck pain and neck stiffness.        Balance Issues , has gotten a little worse     Skin: Negative.  Negative for rash.   Allergic/Immunologic: Negative.    Neurological:  Positive for tremors (Hands, has stayed the same), speech difficulty (Slurred speech at times) and weakness (Legs, has trouble getting up). Negative for dizziness, seizures, syncope, facial asymmetry, light-headedness, numbness and headaches.   Hematological:  Bruises/bleeds easily (Bruise).   Psychiatric/Behavioral:  Positive for confusion (Once in a while), hallucinations and sleep disturbance (Trouble staying asleep).         Memory Issues, has stayed the same     All other systems reviewed and are negative.

## 2024-07-25 NOTE — PROGRESS NOTES
Patient ID: Sonam Barger is a 79 y.o. female    Assessment/Plan:    Parkinson disease (HCC)  Progression in parkinsonian symptoms with increased rigidity, tremor and bradykinesia. Although she would benefit from increase dopaminergic medication but this may worsen hallucinations which has been an issues.   Recommend keeping her on current dose of carbidopa/levodopa 1 tab at 9am, 1pm and 5pm and 1/2 tab at night.  Recommend ROM exercises to lower extremities to prevent  contractures and ambulating daily.      Sleep disorder      Sleep impairment  with symptoms of REM sleep behavior disorder. May benefit from increase in melatonin to 5mg nightly and if no improvement in 2-3 weeks can further increase to 10mg nightly.     Hallucination  Discussed treatment options for hallucinations including retrial of quetiapine versus  pimvaserin (Nuplazid)   At this point given boxed warning family is not comfortable and patient denies feeling threatened by hallucinations. Will continue to monitor. If worsens we can reconsider.  Time spent discussing the use of Nuplazid for PD hallucinations. We discussed the boxed warning of increased risk of death with the use of antipsychotics in the elderly with dementia.        Diagnoses and all orders for this visit:    Parkinson's disease without dyskinesia, unspecified whether manifestations fluctuate    Sleep disorder    Hallucination    I have spent a total time of 40 minutes in caring for this patient on the day of the visit/encounter including Patient and family education, Counseling / Coordination of care, Documenting in the medical record, and Obtaining or reviewing history  .      Subjective:      Ms. Barger is a right handed woman who presents for Movement follow up for Parkinson's disease. Review of his notes and chart reveal symptoms began with right arm tremor in 2016. Frist seen on Azilect with asymmetric rigidity R>L, rest tremor bradykinesia with decrement. Aricept was  started but caused increased tremor.     She resides at [facility].  Tremor is present in hands.    Speech is soft. There is some daytime drooling noted by her family. No difficulty chewing and swallowing.   Ambulates with walker only when she was with PT and with aides when she is agreeable.   She requires assistance with dressing and showering Behavioral changes noted particularly when aides are trying to shower her.    Sleep is interrupted. She has daytime sedation and naps throughout the day.   She arises out of bed at night and she will be up. She does talk in her sleep much of her life. Aides have said she talk in sleep and yells.    Melatonin 3mg at night was started but has not been helpful.   There is no daytime sedation.   She does not socialize.  Cognitive decline.    She has hallucinations, seeing people or a baby. Can occur during day and night.     Current relevant medication:  Sinemet  mg 1 tab at 9 AM, 1 tab at 1 PM, 1 tab at 5 PM and half tab at 9 PM.  Xanax 0.25mg tid prn for anxiety     Prior medications:  Aricept - caused increased tremor   Azilect - switched to selegeline due to cost  Selegeline 5mg bid - stopped due to hallucinations   Seroquel- increased confusion         Objective:    /78 (BP Location: Right arm, Patient Position: Sitting, Cuff Size: Standard)   LMP  (LMP Unknown)       Physical Exam  Vitals reviewed.   Neurological:      Mental Status: She is alert.      Motor: Motor strength is normal.        Neurological Exam  Mental Status  Alert. Oriented only to person. Speech: hypophonia. Language is fluent with no aphasia.    Cranial Nerves  CN III, IV, VI: Diminished smooth pursuit.   Right pupil: Pinpoint.   Left pupil: Pinpoint.  CN VII: Full and symmetric facial movement.  CN VIII: Hearing is normal.  CN XII: Tongue midline without atrophy or fasciculations.    Motor   Increased muscle tone. Strength is 5/5 throughout all four extremities.    Coordination  Right:  Finger-to-nose normal.Left: Finger-to-nose normal.  See motor UPDRS.    Gait    Not ambulated .    UPDRS motor:                              Time since last dose:  7/25/24 4/4/24   Speech  1 1   Facial Expression  3 2   Rigidity - Neck  3 2   Rigidity - Upper Extremity (Right)  2 2   Rigidity - Upper Extremity (Left)   2 1   Rigidity - Lower Extremity (Right)  3 2   Rigidity - Lower Extremity (Left)   3 2   Finger Taps (Right)   3 2   Finger Taps (Left)   3 2   Hand Movement (Right)  3 2   Hand Movement (Left)   2 1   Pronation/Supination (Right)  3 2   Pronation/Supination (Left)   3 2   Toe Tapping (Right) 3 3   Toe Tapping (Left) 3 3   Leg Agility (Right)   2   Leg Agility (Left)    2   Arising from Chair        Gait        Freezing of Gait      Postural Stability        Posture      Global spontaneity of movement 3 2   Postural Tremor (Right) 0 0   Postural Tremor (Left) 0 0   Kinetic Tremor (Right)  0 0   Kinetic Tremor (Left)  0 0   Rest tremor amplitude RUE 1 1   Rest tremor amplitude LUE 0 0   Rest tremor amplitude RLE 1 1   Reset tremor amplitude LLE 1 1   Lip/Jaw Tremor  1 1   Consistency of tremor 2 2   Motor Exam                Kanwal Cyr MD  Movement disorder physician  [facility]

## 2024-07-25 NOTE — ASSESSMENT & PLAN NOTE
Sleep impairment  with symptoms of REM sleep behavior disorder. May benefit from increase in melatonin to 5mg nightly and if no improvement in 2-3 weeks can further increase to 10mg nightly.

## 2024-09-30 ENCOUNTER — PATIENT MESSAGE (OUTPATIENT)
Dept: NEUROLOGY | Facility: CLINIC | Age: 79
End: 2024-09-30

## 2024-10-02 DIAGNOSIS — G20.A1 PARKINSON DISEASE (HCC): ICD-10-CM

## 2024-10-02 RX ORDER — CARBIDOPA AND LEVODOPA 25; 100 MG/1; MG/1
TABLET ORAL
Start: 2024-10-02

## 2024-10-02 NOTE — PATIENT COMMUNICATION
Herve Hill PA-C   to Neurology Neurovascular Team 4       10/2/24 11:03 AM  Please send a fax to the facility with the following medication dosing, I will also change the script.  Thanks!      Sinemet 25/100mg 1tab at 9am, 1/2 tab at 1pm, 1tab at 5pm, 1/2 tab at 9pm.        Thank you!

## 2024-11-22 ENCOUNTER — TELEPHONE (OUTPATIENT)
Dept: NEUROLOGY | Facility: CLINIC | Age: 79
End: 2024-11-22

## 2024-11-22 NOTE — TELEPHONE ENCOUNTER
11/22/24    Patient  Mr. Ly called office returning a call.     Confirmed call for Mr. Ly.    Related Message.    Per Mr. Ly OK, Appt Rescheduled for 03/13/25, 11:00 AM With  Sergio at the [place] office.       Any questions, please contact Patient of .   Thank You.

## 2025-01-03 ENCOUNTER — PATIENT MESSAGE (OUTPATIENT)
Dept: NEUROLOGY | Facility: CLINIC | Age: 80
End: 2025-01-03

## 2025-01-09 DIAGNOSIS — G20.A1 COGNITIVE DEFICIT DUE TO PARKINSON'S DISEASE (HCC): ICD-10-CM

## 2025-01-09 DIAGNOSIS — G20.A1 PARKINSON DISEASE (HCC): Primary | ICD-10-CM

## 2025-01-13 NOTE — PATIENT COMMUNICATION
I spoke to patient's daughter (POA); gave number to palliative care to answer any questions she may have ([phone]); she wcb if needed.

## 2025-01-15 ENCOUNTER — TELEPHONE (OUTPATIENT)
Age: 80
End: 2025-01-15

## 2025-01-15 NOTE — TELEPHONE ENCOUNTER
Patient daughter calling in, stated that [phone] is the best number to call, patient is currently in [facility] and would like to begin Palliative care, however it is hard for her to be transported to \[facility]". Patient daughter would like information on if a visit can be scheduled with Palliate Care- Home or if the consultation could be a virtual. Please call back to assist/provide further information.

## 2025-02-27 ENCOUNTER — TELEPHONE (OUTPATIENT)
Age: 80
End: 2025-02-27

## 2025-02-27 NOTE — TELEPHONE ENCOUNTER
Appointment canceled for Sonam Barger ([id number])  Visit type: OFFICE VISIT SHORT [facility]  3/13/2025 11:00 AM (30 minutes) with Herve Hill PA-C in [facility]    Reason for cancellation: Canceled via MyChart        Called patient to reschedule appointment. Spoke with patient spouse and he stated that he never made it around to calling. Sadly the patient passed away Tuesday night ( 02/25/2025).

## (undated) DEVICE — BLADE INTREX LRG BONE OSCILLATING

## (undated) DEVICE — GLOVE INDICATOR PI UNDERGLOVE SZ 7 BLUE

## (undated) DEVICE — DRESSING MEPILEX AG BORDER 4 X 8 IN

## (undated) DEVICE — PROXIMATE SKIN STAPLERS (35 WIDE) CONTAINS 35 STAINLESS STEEL STAPLES (FIXED HEAD): Brand: PROXIMATE

## (undated) DEVICE — HEAVY DUTY TABLE COVER: Brand: CONVERTORS

## (undated) DEVICE — ABDUCTION PILLOW FOAM POSITIONER: Brand: CARDINAL HEALTH

## (undated) DEVICE — THE SIMPULSE SOLO SYSTEM WITH ULTREX RETRACTABLE SPLASH SHIELD TIP: Brand: SIMPULSE SOLO

## (undated) DEVICE — 3M™ STERI-DRAPE™ U-DRAPE 1015: Brand: STERI-DRAPE™

## (undated) DEVICE — CHLORAPREP HI-LITE 26ML ORANGE

## (undated) DEVICE — CAPIT HIP STEM POR PRIMARY

## (undated) DEVICE — PACK MAJOR ORTHO W/SPLITS PBDS

## (undated) DEVICE — GLOVE SRG BIOGEL 7

## (undated) DEVICE — CAPIT HIP BIPOLAR HEAD POR PRIMARY

## (undated) DEVICE — SUT ETHIBOND 5 V-37 30 IN MB66G

## (undated) DEVICE — 3M™ IOBAN™ 2 ANTIMICROBIAL INCISE DRAPE 6650EZ: Brand: IOBAN™ 2

## (undated) DEVICE — SUT VICRYL 0 CT-1 27 IN J260H

## (undated) DEVICE — HEWSON SUTURE RETRIEVER: Brand: HEWSON SUTURE RETRIEVER

## (undated) DEVICE — 5065 IMPAD REGULAR PAIR: Brand: A-V IMPULSE

## (undated) DEVICE — SKIN MARKER DUAL TIP WITH RULER CAP, FLEXIBLE RULER AND LABELS: Brand: DEVON

## (undated) DEVICE — IMPERVIOUS STOCKINETTE: Brand: DEROYAL

## (undated) DEVICE — PENCIL ELECTROSURG E-Z CLEAN -0035H

## (undated) DEVICE — INTENDED FOR TISSUE SEPARATION, AND OTHER PROCEDURES THAT REQUIRE A SHARP SURGICAL BLADE TO PUNCTURE OR CUT.: Brand: BARD-PARKER SAFETY BLADES SIZE 10, STERILE

## (undated) DEVICE — HOOD: Brand: FLYTE, SURGICOOL

## (undated) DEVICE — GLOVE INDICATOR PI UNDERGLOVE SZ 8 BLUE

## (undated) DEVICE — GLOVE SRG BIOGEL 8

## (undated) DEVICE — SUT VICRYL 2-0 CT-1 27 IN J259H

## (undated) DEVICE — SUT VICRYL 2-0 CT-2 27 IN J269H